# Patient Record
Sex: MALE | Race: WHITE | Employment: PART TIME | URBAN - METROPOLITAN AREA
[De-identification: names, ages, dates, MRNs, and addresses within clinical notes are randomized per-mention and may not be internally consistent; named-entity substitution may affect disease eponyms.]

---

## 2017-07-18 ENCOUNTER — OFFICE VISIT (OUTPATIENT)
Dept: FAMILY MEDICINE CLINIC | Age: 52
End: 2017-07-18

## 2017-07-18 VITALS
SYSTOLIC BLOOD PRESSURE: 118 MMHG | BODY MASS INDEX: 34.95 KG/M2 | TEMPERATURE: 96.7 F | WEIGHT: 258 LBS | RESPIRATION RATE: 16 BRPM | HEART RATE: 80 BPM | DIASTOLIC BLOOD PRESSURE: 92 MMHG | HEIGHT: 72 IN | OXYGEN SATURATION: 97 %

## 2017-07-18 DIAGNOSIS — M54.2 CHRONIC NECK PAIN: ICD-10-CM

## 2017-07-18 DIAGNOSIS — G89.29 CHRONIC NECK PAIN: ICD-10-CM

## 2017-07-18 DIAGNOSIS — M50.30 DEGENERATIVE DISC DISEASE, CERVICAL: Primary | ICD-10-CM

## 2017-07-18 DIAGNOSIS — R03.0 ELEVATED BP WITHOUT DIAGNOSIS OF HYPERTENSION: ICD-10-CM

## 2017-07-18 RX ORDER — ACETAMINOPHEN AND CODEINE PHOSPHATE 300; 30 MG/1; MG/1
1 TABLET ORAL
COMMUNITY
End: 2017-10-02 | Stop reason: SDUPTHER

## 2017-07-18 RX ORDER — CICLOPIROX OLAMINE 7.7 MG/G
CREAM TOPICAL 2 TIMES DAILY
COMMUNITY
End: 2018-02-05

## 2017-07-18 RX ORDER — CYCLOBENZAPRINE HCL 10 MG
TABLET ORAL
COMMUNITY
End: 2018-02-05

## 2017-07-18 RX ORDER — OXYCODONE AND ACETAMINOPHEN 5; 325 MG/1; MG/1
TABLET ORAL
COMMUNITY
End: 2017-11-06 | Stop reason: ALTCHOICE

## 2017-07-18 RX ORDER — ALBUTEROL SULFATE 90 UG/1
AEROSOL, METERED RESPIRATORY (INHALATION)
COMMUNITY

## 2017-07-18 RX ORDER — BUDESONIDE AND FORMOTEROL FUMARATE DIHYDRATE 160; 4.5 UG/1; UG/1
2 AEROSOL RESPIRATORY (INHALATION) 2 TIMES DAILY
COMMUNITY
End: 2017-11-06 | Stop reason: SDUPTHER

## 2017-07-18 RX ORDER — ZOLPIDEM TARTRATE 5 MG/1
TABLET ORAL
COMMUNITY
End: 2017-09-01 | Stop reason: SDUPTHER

## 2017-07-18 RX ORDER — GABAPENTIN 300 MG/1
300 CAPSULE ORAL
Qty: 60 CAP | Refills: 2 | Status: SHIPPED | OUTPATIENT
Start: 2017-07-18 | End: 2017-08-04 | Stop reason: SDUPTHER

## 2017-07-18 RX ORDER — TAMSULOSIN HYDROCHLORIDE 0.4 MG/1
0.4 CAPSULE ORAL DAILY
COMMUNITY
End: 2017-10-19 | Stop reason: SDUPTHER

## 2017-07-18 RX ORDER — ALLOPURINOL 100 MG/1
TABLET ORAL DAILY
COMMUNITY
End: 2017-09-15 | Stop reason: SDUPTHER

## 2017-07-18 NOTE — MR AVS SNAPSHOT
Visit Information Date & Time Provider Department Dept. Phone Encounter #  
 7/18/2017  8:30 AM Patel Mejia MD [facility] [phone] [id number] Follow-up Instructions Return if symptoms worsen or fail to improve. Allergies as of 7/18/2017  Review Complete On: 7/18/2017 By: Patel Mejia MD  
 No Known Allergies Current Immunizations  Never Reviewed No immunizations on file. Not reviewed this visit You Were Diagnosed With   
  
 Codes Comments Degenerative disc disease, cervical    -  Primary ICD-10-CM: M50.30 ICD-9-CM: 722.4 Chronic neck pain     ICD-10-CM: M54.2, G89.29 ICD-9-CM: 723.1, 338.29 Vitals BP Pulse Temp Resp Height(growth percentile) Weight(growth percentile) (!) 135/108 (BP 1 Location: Right arm, BP Patient Position: Sitting) 80 96.7 °F (35.9 °C) (Oral) 16 6' (1.829 m) 258 lb (117 kg) SpO2 BMI Smoking Status 97% 34.99 kg/m2 Former Smoker BMI and BSA Data Body Mass Index Body Surface Area 34.99 kg/m 2 2.44 m 2 Preferred Pharmacy Pharmacy Name Phone [address] [facility] [address], [address] Your Updated Medication List  
  
   
This list is accurate as of: 7/18/17  9:09 AM.  Always use your most recent med list.  
  
  
  
  
 albuterol 90 mcg/actuation inhaler Commonly known as:  PROVENTIL HFA, VENTOLIN HFA, PROAIR HFA Take  by inhalation. allopurinol 100 mg tablet Commonly known as:  Rachael Roberto Take  by mouth daily. AMBIEN 5 mg tablet Generic drug:  zolpidem Take  by mouth nightly as needed for Sleep. ciclopirox 0.77 % topical cream  
Commonly known as:  Claria Emmer Apply  to affected area two (2) times a day. cyclobenzaprine 10 mg tablet Commonly known as:  FLEXERIL Take  by mouth three (3) times daily as needed for Muscle Spasm(s). gabapentin 300 mg capsule Commonly known as:  NEURONTIN Take 1 Cap by mouth nightly. PERCOCET 5-325 mg per tablet Generic drug:  oxyCODONE-acetaminophen Take  by mouth every six (6) hours as needed for Pain. SYMBICORT 160-4.5 mcg/actuation HFA inhaler Generic drug:  budesonide-formoterol Take 2 Puffs by inhalation two (2) times a day. tamsulosin 0.4 mg capsule Commonly known as:  FLOMAX Take 0.4 mg by mouth daily. TYLENOL-CODEINE #3 300-30 mg per tablet Generic drug:  acetaminophen-codeine Take 1 Tab by mouth every six (6) hours as needed for Pain. Prescriptions Sent to Pharmacy Refills  
 gabapentin (NEURONTIN) 300 mg capsule 2 Sig: Take 1 Cap by mouth nightly. Class: Normal  
 Pharmacy: [address] [place] [address] #: [phone] Route: Oral  
  
We Performed the Following REFERRAL TO ORTHOPEDICS [HCL735 Custom] Comments:  
 Please evaluate for chronic neck pain REFERRAL TO PAIN MANAGEMENT [BDP391 Custom] Comments:  
 Please evaluate patient for chronic neck pain. Follow-up Instructions Return if symptoms worsen or fail to improve. Referral Information Referral ID Referred By Referred To  
  
 [id number] THADDEUS [address] [place] [address] MD Magan   
   [address] [place] [address] Street Phone: [phone] Fax: [phone] Visits Status Start Date End Date 1 New Request 7/18/17 7/18/18 If your referral has a status of pending review or denied, additional information will be sent to support the outcome of this decision. Referral ID Referred By Referred To  
 [id number] Deacon TRAVIS PA-C  
   [address] [facility], [address] Phone: [phone] Fax: [phone] Visits Status Start Date End Date 1 New Request 7/18/17 7/18/18  If your referral has a status of pending review or denied, additional information will be sent to support the outcome of this decision. Patient Instructions I am referring you to our spine specialist and pain management specialist. They do epidurals, etc if needed. In the meantime, start neurontin 300 mg at bedtime. If needed, after a week increase to 300 mg twice daily. Recheck as needed for other medical issues. Introducing [facility] SERVICES! Greg Brewster introduces [employer] patient portal. Now you can access parts of your medical record, email your doctor's office, and request medication refills online. 1. In your internet browser, go to [email or web address] Me 2. Click on the First Time User? Click Here link in the Sign In box. You will see the New Member Sign Up page. 3. Enter your [employer] Access Code exactly as it appears below. You will not need to use this code after youve completed the sign-up process. If you do not sign up before the expiration date, you must request a new code. · [employer] Access Code: [email or web address] Expires: 10/16/2017  8:53 AM 
 
4. Enter the last four digits of your Social Security Number (xxxx) and Date of Birth (mm/dd/yyyy) as indicated and click Submit. You will be taken to the next sign-up page. 5. Create a [employer] ID. This will be your [employer] login ID and cannot be changed, so think of one that is secure and easy to remember. 6. Create a [employer] password. You can change your password at any time. 7. Enter your Password Reset Question and Answer. This can be used at a later time if you forget your password. 8. Enter your e-mail address. You will receive e-mail notification when new information is available in [address] 9. Click Sign Up. You can now view and download portions of your medical record. 10. Click the Download Summary menu link to download a portable copy of your medical information.  
 
If you have questions, please visit the Frequently Asked Questions section of the [employer]. Remember, [employer] is NOT to be used for urgent needs. For medical emergencies, dial 911. Now available from your iPhone and Android! Please provide this summary of care documentation to your next provider. If you have any questions after today's visit, please call [phone].
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
## 2017-07-18 NOTE — PATIENT INSTRUCTIONS
I am referring you to our spine specialist and pain management specialist. They do epidurals, etc if needed. In the meantime, start neurontin 300 mg at bedtime. If needed, after a week increase to 300 mg twice daily. Recheck as needed for other medical issues.

## 2017-07-24 ENCOUNTER — HOSPITAL ENCOUNTER (OUTPATIENT)
Dept: LAB | Age: 52
Discharge: HOME | End: 2017-07-24
Payer: MEDICARE

## 2017-07-24 ENCOUNTER — OFFICE VISIT (OUTPATIENT)
Dept: FAMILY MEDICINE CLINIC | Age: 52
End: 2017-07-24

## 2017-07-24 VITALS
DIASTOLIC BLOOD PRESSURE: 94 MMHG | HEIGHT: 72 IN | OXYGEN SATURATION: 96 % | SYSTOLIC BLOOD PRESSURE: 138 MMHG | RESPIRATION RATE: 16 BRPM | WEIGHT: 260 LBS | HEART RATE: 80 BPM | TEMPERATURE: 97.7 F | BODY MASS INDEX: 35.21 KG/M2

## 2017-07-24 DIAGNOSIS — E78.5 HYPERLIPIDEMIA, UNSPECIFIED HYPERLIPIDEMIA TYPE: ICD-10-CM

## 2017-07-24 DIAGNOSIS — R03.0 ELEVATED BLOOD-PRESSURE READING WITHOUT DIAGNOSIS OF HYPERTENSION: ICD-10-CM

## 2017-07-24 DIAGNOSIS — E78.5 HYPERLIPIDEMIA, UNSPECIFIED HYPERLIPIDEMIA TYPE: Primary | ICD-10-CM

## 2017-07-24 DIAGNOSIS — Z79.899 HIGH RISK MEDICATION USE: ICD-10-CM

## 2017-07-24 DIAGNOSIS — Z13.31 SCREENING FOR DEPRESSION: ICD-10-CM

## 2017-07-24 DIAGNOSIS — E79.0 HYPERURICEMIA: ICD-10-CM

## 2017-07-24 LAB
ALBUMIN SERPL BCP-MCNC: 3.6 G/DL (ref 3.4–5)
ALBUMIN/GLOB SERPL: 1.2 {RATIO} (ref 0.8–1.7)
ALP SERPL-CCNC: 93 U/L (ref 45–117)
ALT SERPL-CCNC: 33 U/L (ref 16–61)
ANION GAP BLD CALC-SCNC: 10 MMOL/L (ref 3–18)
AST SERPL W P-5'-P-CCNC: 16 U/L (ref 15–37)
BILIRUB SERPL-MCNC: 0.2 MG/DL (ref 0.2–1)
BUN SERPL-MCNC: 21 MG/DL (ref 7–18)
BUN/CREAT SERPL: 16 (ref 12–20)
CALCIUM SERPL-MCNC: 8.8 MG/DL (ref 8.5–10.1)
CHLORIDE SERPL-SCNC: 110 MMOL/L (ref 100–108)
CHOLEST SERPL-MCNC: 179 MG/DL
CO2 SERPL-SCNC: 24 MMOL/L (ref 21–32)
CREAT SERPL-MCNC: 1.28 MG/DL (ref 0.6–1.3)
GLOBULIN SER CALC-MCNC: 3 G/DL (ref 2–4)
GLUCOSE SERPL-MCNC: 106 MG/DL (ref 74–99)
HDLC SERPL-MCNC: 19 MG/DL (ref 40–60)
HDLC SERPL: 9.4 {RATIO} (ref 0–5)
LDLC SERPL CALC-MCNC: 112.8 MG/DL (ref 0–100)
LIPID PROFILE,FLP: ABNORMAL
POTASSIUM SERPL-SCNC: 4.9 MMOL/L (ref 3.5–5.5)
PROT SERPL-MCNC: 6.6 G/DL (ref 6.4–8.2)
SODIUM SERPL-SCNC: 144 MMOL/L (ref 136–145)
TRIGL SERPL-MCNC: 236 MG/DL (ref ?–150)
URATE SERPL-MCNC: 7.7 MG/DL (ref 2.6–7.2)
VLDLC SERPL CALC-MCNC: 47.2 MG/DL

## 2017-07-24 PROCEDURE — 84550 ASSAY OF BLOOD/URIC ACID: CPT | Performed by: FAMILY MEDICINE

## 2017-07-24 PROCEDURE — 80061 LIPID PANEL: CPT | Performed by: FAMILY MEDICINE

## 2017-07-24 PROCEDURE — 80053 COMPREHEN METABOLIC PANEL: CPT | Performed by: FAMILY MEDICINE

## 2017-07-24 NOTE — PROGRESS NOTES
Rm 1  Pt presents to the clinic for a CPE. Depression Screening Completed: yes    Learning Assessment Completed: yes    Abuse Screening Completed: n/a    Health Maintenance reviewed and discussed per provider: yes     Coordination of Care:    1. Have you been to the ER, urgent care clinic since your last visit? Hospitalized since your last visit? no    2. Have you seen or consulted any other health care providers outside of the [address] [place] [address] since your last visit? Include any pap smears or colon screening.  no

## 2017-07-24 NOTE — PROGRESS NOTES
HISTORY OF PRESENT ILLNESS  Reina Farris is a 45 y.o. male. HPI Comments: Mr. Tulio Carney is here for a checkup. He has a history of BPH, elevated lipids, and his BP has been slightly high the last two visits. He has chronic back issues and I referred him to Dr. Brittany Ortiz and pain management. Complete Physical   Pertinent negatives include no chest pain, no abdominal pain, no headaches and no shortness of breath. Review of Systems   Constitutional: Negative for chills and fever. Eyes: Negative for blurred vision and double vision. Respiratory: Negative for cough and shortness of breath. Cardiovascular: Negative for chest pain and palpitations. Gastrointestinal: Negative for abdominal pain, nausea and vomiting. Musculoskeletal: Positive for back pain, myalgias and neck pain. Neurological: Negative for headaches. Psychiatric/Behavioral: The patient has insomnia. Physical Exam   Constitutional: He is oriented to person, place, and time. He appears well-developed and well-nourished. No distress. HENT:   Head: Normocephalic. Right Ear: External ear normal.   Left Ear: External ear normal.   Eyes: Conjunctivae are normal. Pupils are equal, round, and reactive to light. Neck: Normal range of motion. Neck supple. No thyromegaly present. Cardiovascular: Normal rate, regular rhythm and normal heart sounds. No murmur heard. Pulmonary/Chest: Effort normal and breath sounds normal. No respiratory distress. He has no wheezes. He has no rales. Abdominal: Soft. Bowel sounds are normal. He exhibits no distension and no mass. There is no tenderness. There is no rebound and no guarding. Musculoskeletal:   Decreased ROM back   Lymphadenopathy:     He has no cervical adenopathy. Neurological: He is alert and oriented to person, place, and time. Coordination normal.   Skin: No rash noted. Psychiatric: He has a normal mood and affect.  His behavior is normal.   Nursing note and vitals reviewed. ASSESSMENT and PLAN    ICD-10-CM ICD-9-CM    1. Hyperlipidemia, unspecified hyperlipidemia type E78.5 272.4 LIPID PANEL   2. Elevated blood-pressure reading without diagnosis of hypertension P36.4 840.4 METABOLIC PANEL, COMPREHENSIVE   3. Hyperuricemia E79.0 790.6 URIC ACID   4. High risk medication use O81.574 S88.99 METABOLIC PANEL, COMPREHENSIVE   5.  Screening for depression Z13.89 V79.0 TX DEPRESSION SCREEN ANNUAL       AVS instructions reviewed with patient, pt verbalized understanding

## 2017-07-24 NOTE — PATIENT INSTRUCTIONS
Follow up on lab results in 1-2 days. If you sign up for \"My Chart\" you will be able to see the results online, and if you have any questions you can send me an e-mail.      Recheck will depend on lab results

## 2017-07-24 NOTE — MR AVS SNAPSHOT
Visit Information Date & Time Provider Department Dept. Phone Encounter #  
 7/24/2017  7:00 AM Jessi Landers, [address] [phone] [id number] Your Appointments 8/30/2017  3:00 PM  
Nurse Visit with [facility] NURSE [facility] Appt Note: psa-gre  
 [address] [facility] [address] [address] [id number]  
  
    
 9/6/2017  3:00 PM  
ESTABLISHED PATIENT with Robby Avila MD  
[facility]) Appt Note: Return in about 1 year (around 9/29/2017) for Sameday PSA, PVR;, FLOW;.  
 [address] [address]  
[phone]  
  
   
 [place] [id number] Upcoming Health Maintenance Date Due Hepatitis C Screening 1965 Pneumococcal 19-64 Medium Risk (1 of 1 - PPSV23) 11/28/1983 DTaP/Tdap/Td series (1 - Tdap) 11/28/1985 FOBT Q 1 YEAR AGE 50-75 11/28/2014 INFLUENZA AGE 9 TO ADULT 8/1/2017 Allergies as of 7/24/2017  Review Complete On: 7/24/2017 By: Bryan Hodges LPN No Known Allergies Current Immunizations  Never Reviewed No immunizations on file. Not reviewed this visit You Were Diagnosed With   
  
 Codes Comments Hyperlipidemia, unspecified hyperlipidemia type    -  Primary ICD-10-CM: E78.5 ICD-9-CM: 272.4 Elevated blood-pressure reading without diagnosis of hypertension     ICD-10-CM: R03.0 ICD-9-CM: 796.2 Hyperuricemia     ICD-10-CM: E79.0 ICD-9-CM: 790.6 High risk medication use     ICD-10-CM: Z79.899 ICD-9-CM: V58.69 Screening for depression     ICD-10-CM: Z13.89 ICD-9-CM: V79.0 Vitals BP Pulse Temp Resp Height(growth percentile) Weight(growth percentile)  (!) 138/94 (BP 1 Location: Right arm, BP Patient Position: Sitting) 80 97.7 °F (36.5 °C) (Oral) 16 6' (1.829 m) 260 lb (117.9 kg) SpO2 BMI Smoking Status 96% 35.26 kg/m2 Former Smoker Vitals History BMI and BSA Data Body Mass Index Body Surface Area  
 35.26 kg/m 2 2.45 m 2 Preferred Pharmacy Pharmacy Name Phone [address], [address] Your Updated Medication List  
  
   
This list is accurate as of: 7/24/17  7:37 AM.  Always use your most recent med list.  
  
  
  
  
 albuterol 90 mcg/actuation inhaler Commonly known as:  PROVENTIL HFA, VENTOLIN HFA, PROAIR HFA Take  by inhalation. * allopurinol 100 mg tablet Commonly known as:  Magalene Oyster Take 100 mg by mouth daily. * allopurinol 100 mg tablet Commonly known as:  Magalene Oyster Take  by mouth daily. * AMBIEN 5 mg tablet Generic drug:  zolpidem Take  by mouth nightly as needed for Sleep. * AMBIEN 5 mg tablet Generic drug:  zolpidem Take  by mouth nightly as needed for Sleep. ANTIFUNGAL EX  
by Apply Externally route. ciclopirox 0.77 % topical cream  
Commonly known as:  Sharyon  Apply  to affected area two (2) times a day. cyclobenzaprine 10 mg tablet Commonly known as:  FLEXERIL Take  by mouth three (3) times daily as needed for Muscle Spasm(s). gabapentin 300 mg capsule Commonly known as:  NEURONTIN Take 1 Cap by mouth nightly. mometasone 0.1 % ointment Commonly known as:  Cammy Fan Apply  to affected area daily. PERCOCET 5-325 mg per tablet Generic drug:  oxyCODONE-acetaminophen Take  by mouth every six (6) hours as needed for Pain. SYMBICORT 160-4.5 mcg/actuation HFA inhaler Generic drug:  budesonide-formoterol Take 2 Puffs by inhalation two (2) times a day. * tamsulosin 0.4 mg capsule Commonly known as:  FLOMAX Take 0.4 mg by mouth daily. * tamsulosin 0.4 mg capsule Commonly known as:  FLOMAX Take 1 Cap by mouth daily (after dinner). * TYLENOL-CODEINE #3 300-30 mg per tablet Generic drug:  acetaminophen-codeine Take 1 Tab by mouth every four (4) hours as needed for Pain. * TYLENOL-CODEINE #3 300-30 mg per tablet Generic drug:  acetaminophen-codeine Take 1 Tab by mouth every six (6) hours as needed for Pain. * Notice: This list has 8 medication(s) that are the same as other medications prescribed for you. Read the directions carefully, and ask your doctor or other care provider to review them with you. We Performed the Following [address] Monitor My Meds [ [facility]] To-Do List   
 07/24/2017 Lab:  LIPID PANEL   
  
 07/24/2017 Lab:  METABOLIC PANEL, COMPREHENSIVE   
  
 07/24/2017 Lab:  URIC ACID Patient Instructions Follow up on lab results in 1-2 days. If you sign up for \"My Chart\" you will be able to see the results online, and if you have any questions you can send me an e-mail. Recheck will depend on lab results Introducing [facility] HE[facility] SERVICES! Dotty Potter introduces [employer] patient portal. Now you can access parts of your medical record, email your doctor's office, and request medication refills online. 1. In your internet browser, go to [email or web address] 2. Click on the First Time User? Click Here link in the Sign In box. You will see the New Member Sign Up page. 3. Enter your [employer] Access Code exactly as it appears below. You will not need to use this code after youve completed the sign-up process. If you do not sign up before the expiration date, you must request a new code. · [employer] Access Code: [email or web address] Expires: 7/31/2017  3:29 PM 
 
4. Enter the last four digits of your Social Security Number (xxxx) and Date of Birth (mm/dd/yyyy) as indicated and click Submit. You will be taken to the next sign-up page. 5. Create a [employer] ID. This will be your [employer] login ID and cannot be changed, so think of one that is secure and easy to remember. 6. Create a [employer] password. You can change your password at any time. 7. Enter your Password Reset Question and Answer. This can be used at a later time if you forget your password. 8. Enter your e-mail address. You will receive e-mail notification when new information is available in [address] 9. Click Sign Up. You can now view and download portions of your medical record. 10. Click the Download Summary menu link to download a portable copy of your medical information. If you have questions, please visit the Frequently Asked Questions section of the [employer] website. Remember, [employer] is NOT to be used for urgent needs. For medical emergencies, dial 911. Now available from your iPhone and Android! Please provide this summary of care documentation to your next provider. Your primary care clinician is listed as Zurdo Campos. If you have any questions after today's visit, please call [phone].

## 2017-07-25 ENCOUNTER — TELEPHONE (OUTPATIENT)
Dept: FAMILY MEDICINE CLINIC | Age: 52
End: 2017-07-25

## 2017-07-31 NOTE — PROGRESS NOTES
Advise pt that his lipids are slightly elevated, and his HDL (good cholesterol) is very low. Since he can't exercise very much due to his back, he really needs to cut back on carbs and fat and lose some weight or he will be a setup for trouble in the future.

## 2017-08-01 NOTE — PROGRESS NOTES
Called pt and informed him of his elevated lipids and the need to watch his diet more closely, decreasing his carb and fat intake and possibly losing some weight. Pt verbalized understanding.

## 2017-08-04 DIAGNOSIS — M54.2 CHRONIC NECK PAIN: ICD-10-CM

## 2017-08-04 DIAGNOSIS — M50.30 DEGENERATIVE DISC DISEASE, CERVICAL: ICD-10-CM

## 2017-08-04 DIAGNOSIS — G89.29 CHRONIC NECK PAIN: ICD-10-CM

## 2017-08-04 RX ORDER — GABAPENTIN 300 MG/1
300 CAPSULE ORAL
Qty: 60 CAP | Refills: 2 | Status: SHIPPED | OUTPATIENT
Start: 2017-08-04 | End: 2017-08-07 | Stop reason: DRUGHIGH

## 2017-08-07 ENCOUNTER — TELEPHONE (OUTPATIENT)
Dept: FAMILY MEDICINE CLINIC | Age: 52
End: 2017-08-07

## 2017-08-07 RX ORDER — GABAPENTIN 300 MG/1
300 CAPSULE ORAL 2 TIMES DAILY
Qty: 180 CAP | Refills: 0 | Status: SHIPPED | OUTPATIENT
Start: 2017-08-07 | End: 2017-08-30 | Stop reason: SDUPTHER

## 2017-08-07 NOTE — TELEPHONE ENCOUNTER
Patient called the office to report a discrepancy with his prescription. Patient stated he was told to take gabapentin 300 mg twice daily. A refill was done on 08/04/17 for the prescription but the sig reflected \"take 1 cap nightly\". Patient is requesting a changed be made because workers compensation would not cover his prescription. Patient was advised a message will be sent to Dr. Alesia Panda and the prescription will be available later at his pharmacy. Patient verbalized understanding and had no further questions.

## 2017-08-31 RX ORDER — GABAPENTIN 300 MG/1
300 CAPSULE ORAL 2 TIMES DAILY
Qty: 180 CAP | Refills: 0 | Status: SHIPPED | OUTPATIENT
Start: 2017-08-31 | End: 2017-10-02 | Stop reason: SDUPTHER

## 2017-08-31 RX ORDER — ZOLPIDEM TARTRATE 5 MG/1
5 TABLET ORAL
Qty: 30 TAB | Refills: 0 | OUTPATIENT
Start: 2017-08-31

## 2017-08-31 RX ORDER — ACETAMINOPHEN AND CODEINE PHOSPHATE 300; 30 MG/1; MG/1
1 TABLET ORAL
Qty: 60 TAB | Refills: 0 | Status: SHIPPED | OUTPATIENT
Start: 2017-08-31 | End: 2017-11-06 | Stop reason: SDUPTHER

## 2017-08-31 NOTE — TELEPHONE ENCOUNTER
Pt has an appt with Dr. Stephy Vallejo on 9/11. Will give him enough meds until then.     reviewed, he just received 30 days worth of Ambien on 8/7 and 60 Tylenol #3 tabs on 8/7

## 2017-09-01 DIAGNOSIS — G47.00 INSOMNIA, UNSPECIFIED TYPE: Primary | ICD-10-CM

## 2017-09-01 RX ORDER — ZOLPIDEM TARTRATE 5 MG/1
5 TABLET ORAL
Qty: 30 TAB | Refills: 0 | Status: SHIPPED | OUTPATIENT
Start: 2017-09-07 | End: 2017-10-02 | Stop reason: SDUPTHER

## 2017-09-11 ENCOUNTER — OFFICE VISIT (OUTPATIENT)
Dept: ORTHOPEDIC SURGERY | Age: 52
End: 2017-09-11

## 2017-09-11 VITALS
OXYGEN SATURATION: 97 % | BODY MASS INDEX: 36.48 KG/M2 | SYSTOLIC BLOOD PRESSURE: 141 MMHG | TEMPERATURE: 98.1 F | WEIGHT: 269 LBS | DIASTOLIC BLOOD PRESSURE: 84 MMHG | HEART RATE: 68 BPM | RESPIRATION RATE: 18 BRPM

## 2017-09-11 DIAGNOSIS — M54.50 LUMBAR SPINE PAIN: ICD-10-CM

## 2017-09-11 DIAGNOSIS — M54.16 LUMBAR RADICULOPATHY: ICD-10-CM

## 2017-09-11 DIAGNOSIS — M54.2 NECK PAIN: Primary | ICD-10-CM

## 2017-09-11 DIAGNOSIS — M79.18 MYOFASCIAL PAIN: ICD-10-CM

## 2017-09-11 RX ORDER — PREDNISONE 10 MG/1
TABLET ORAL
Qty: 21 TAB | Refills: 0 | Status: SHIPPED | OUTPATIENT
Start: 2017-09-11 | End: 2018-02-05 | Stop reason: ALTCHOICE
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
## 2017-09-11 NOTE — PATIENT INSTRUCTIONS
Nomiku Activation    Thank you for requesting access to Nomiku. Please follow the instructions below to securely access and download your online medical record. Nomiku allows you to send messages to your doctor, view your test results, renew your prescriptions, schedule appointments, and more. How Do I Sign Up? 1. In your internet browser, go to [email or web address]  2. Click on the First Time User? Click Here link in the Sign In box. You will be redirect to the New Member Sign Up page. 3. Enter your Nomiku Access Code exactly as it appears below. You will not need to use this code after youve completed the sign-up process. If you do not sign up before the expiration date, you must request a new code. Nomiku Access Code: [email or web address]  Expires: 2017  2:19 PM (This is the date your Nomiku access code will )    4. Enter the last four digits of your Social Security Number (xxxx) and Date of Birth (mm/dd/yyyy) as indicated and click Submit. You will be taken to the next sign-up page. 5. Create a Nomiku ID. This will be your Nomiku login ID and cannot be changed, so think of one that is secure and easy to remember. 6. Create a Nomiku password. You can change your password at any time. 7. Enter your Password Reset Question and Answer. This can be used at a later time if you forget your password. 8. Enter your e-mail address. You will receive e-mail notification when new information is available in [address]. 9. Click Sign Up. You can now view and download portions of your medical record. 10. Click the Download Summary menu link to download a portable copy of your medical information. Additional Information    If you have questions, please visit the Frequently Asked Questions section of the Nomiku website at [email or web address] Remember, Nomiku is NOT to be used for urgent needs. For medical emergencies, dial 911.          Neck: Exercises  Your Care Instructions  Here are some examples of typical rehabilitation exercises for your condition. Start each exercise slowly. Ease off the exercise if you start to have pain. Your doctor or physical therapist will tell you when you can start these exercises and which ones will work best for you. How to do the exercises  Note: Stretching should make you feel a gentle stretch, but no pain. Stop any strengthening exercise that makes pain worse. Neck stretch    1. This stretch works best if you keep your shoulder down as you lean away from it. To help you remember to do this, start by relaxing your shoulders and lightly holding on to your thighs or your chair. 2. Tilt your head toward your shoulder and hold for 15 to 30 seconds. Let the weight of your head stretch your muscles. 3. If you would like a little added stretch, use your hand to gently and steadily pull your head toward your shoulder. For example, keeping your right shoulder down, lean your head to the left. 4. Repeat 2 to 4 times toward each shoulder. Diagonal neck stretch    1. Turn your head slightly toward the direction you will be stretching, and tilt your head diagonally toward your chest and hold for 15 to 30 seconds. 2. If you would like a little added stretch, use your hand to gently and steadily pull your head forward on the diagonal.  3. Repeat 2 to 4 times toward each side. Dorsal glide stretch    1. Sit or stand tall and look straight ahead. 2. Slowly tuck your chin as you glide your head backward over your body  3. Hold for a count of 6, and then relax for up to 10 seconds. 4. Repeat 8 to 12 times. Note: The dorsal glide stretches the back of the neck. If you feel pain, do not glide so far back. Some people find this exercise easier to do while lying on their backs with an ice pack on the neck. Chest and shoulder stretch    1. Sit or stand tall and glide your head backward as in the dorsal glide stretch.   2. Raise both arms so that your hands are next to your ears. 3. Take a deep breath, and as you breathe out, lower your elbows down and behind your back. You will feel your shoulder blades slide down and together, and at the same time you will feel a stretch across your chest and the front of your shoulders. 4. Hold for about 6 seconds, and then relax for up to 10 seconds. 5. Repeat 8 to 12 times. Strengthening: Hands on head    1. Move your head backward, forward, and side to side against gentle pressure from your hands, holding each position for about 6 seconds. 2. Repeat 8 to 12 times. Follow-up care is a key part of your treatment and safety. Be sure to make and go to all appointments, and call your doctor if you are having problems. It's also a good idea to know your test results and keep a list of the medicines you take. Where can you learn more? Go to http://josh-elicia.info/. Enter P975 in the search box to learn more about \"Neck: Exercises. \"  Current as of: March 21, 2017  Content Version: 11.3  © 6735-8829 [employer]. Care instructions adapted under license by [employer] (which disclaims liability or warranty for this information). If you have questions about a medical condition or this instruction, always ask your healthcare professional. [address] any warranty or liability for your use of this information. Low Back Pain: Exercises  Your Care Instructions  Here are some examples of typical rehabilitation exercises for your condition. Start each exercise slowly. Ease off the exercise if you start to have pain. Your doctor or physical therapist will tell you when you can start these exercises and which ones will work best for you. How to do the exercises  Press-up    5. Lie on your stomach, supporting your body with your forearms. 6. Press your elbows down into the floor to raise your upper back.  As you do this, relax your stomach muscles and allow your back to arch without using your back muscles. As your press up, do not let your hips or pelvis come off the floor. 7. Hold for 15 to 30 seconds, then relax. 8. Repeat 2 to 4 times. Alternate arm and leg (bird dog) exercise    Note: Do this exercise slowly. Try to keep your body straight at all times, and do not let one hip drop lower than the other. 4. Start on the floor, on your hands and knees. 5. Tighten your belly muscles. 6. Raise one leg off the floor, and hold it straight out behind you. Be careful not to let your hip drop down, because that will twist your trunk. 7. Hold for about 6 seconds, then lower your leg and switch to the other leg. 8. Repeat 8 to 12 times on each leg. 9. Over time, work up to holding for 10 to 30 seconds each time. 10. If you feel stable and secure with your leg raised, try raising the opposite arm straight out in front of you at the same time. Knee-to-chest exercise    5. Lie on your back with your knees bent and your feet flat on the floor. 6. Bring one knee to your chest, keeping the other foot flat on the floor (or keeping the other leg straight, whichever feels better on your lower back). 7. Keep your lower back pressed to the floor. Hold for at least 15 to 30 seconds. 8. Relax, and lower the knee to the starting position. 9. Repeat with the other leg. Repeat 2 to 4 times with each leg. 10. To get more stretch, put your other leg flat on the floor while pulling your knee to your chest.  Curl-ups    6. Lie on the floor on your back with your knees bent at a 90-degree angle. Your feet should be flat on the floor, about 12 inches from your buttocks. 7. Cross your arms over your chest. If this bothers your neck, try putting your hands behind your neck (not your head), with your elbows spread apart. 8. Slowly tighten your belly muscles and raise your shoulder blades off the floor.   9. Keep your head in line with your body, and do not press your chin to your chest.  10. Hold this position for 1 or 2 seconds, then slowly lower yourself back down to the floor. 11. Repeat 8 to 12 times. Pelvic tilt exercise    3. Lie on your back with your knees bent. 4. \"Brace\" your stomach. This means to tighten your muscles by pulling in and imagining your belly button moving toward your spine. You should feel like your back is pressing to the floor and your hips and pelvis are rocking back. 5. Hold for about 6 seconds while you breathe smoothly. 6. Repeat 8 to 12 times. Heel dig bridging    1. Lie on your back with both knees bent and your ankles bent so that only your heels are digging into the floor. Your knees should be bent about 90 degrees. 2. Then push your heels into the floor, squeeze your buttocks, and lift your hips off the floor until your shoulders, hips, and knees are all in a straight line. 3. Hold for about 6 seconds as you continue to breathe normally, and then slowly lower your hips back down to the floor and rest for up to 10 seconds. 4. Do 8 to 12 repetitions. Hamstring stretch in doorway    1. Lie on your back in a doorway, with one leg through the open door. 2. Slide your leg up the wall to straighten your knee. You should feel a gentle stretch down the back of your leg. 3. Hold the stretch for at least 15 to 30 seconds. Do not arch your back, point your toes, or bend either knee. Keep one heel touching the floor and the other heel touching the wall. 4. Repeat with your other leg. 5. Do 2 to 4 times for each leg. Hip flexor stretch    1. Kneel on the floor with one knee bent and one leg behind you. Place your forward knee over your foot. Keep your other knee touching the floor. 2. Slowly push your hips forward until you feel a stretch in the upper thigh of your rear leg. 3. Hold the stretch for at least 15 to 30 seconds. Repeat with your other leg. 4. Do 2 to 4 times on each side. Wall sit    1.  Stand with your back 10 to 12 inches away from a wall.  2. Lean into the wall until your back is flat against it. 3. Slowly slide down until your knees are slightly bent, pressing your lower back into the wall. 4. Hold for about 6 seconds, then slide back up the wall. 5. Repeat 8 to 12 times. Follow-up care is a key part of your treatment and safety. Be sure to make and go to all appointments, and call your doctor if you are having problems. It's also a good idea to know your test results and keep a list of the medicines you take. Where can you learn more? Go to http://josh-elicia.info/. Enter B314 in the search box to learn more about \"Low Back Pain: Exercises. \"  Current as of: March 21, 2017  Content Version: 11.3  © 9453-2650 [employer]. Care instructions adapted under license by [employer] (which disclaims liability or warranty for this information). If you have questions about a medical condition or this instruction, always ask your healthcare professional. [address] any warranty or liability for your use of this information.

## 2017-09-13 ENCOUNTER — TELEPHONE (OUTPATIENT)
Dept: ORTHOPEDIC SURGERY | Age: 52
End: 2017-09-13

## 2017-09-13 NOTE — TELEPHONE ENCOUNTER
Requested auth for MRI from [employer] [address] [facility] , auth pending.

## 2017-09-15 DIAGNOSIS — M10.9 ACUTE GOUT, UNSPECIFIED CAUSE, UNSPECIFIED SITE: Primary | ICD-10-CM

## 2017-09-15 DIAGNOSIS — M1A.9XX0 CHRONIC GOUT WITHOUT TOPHUS, UNSPECIFIED CAUSE, UNSPECIFIED SITE: ICD-10-CM

## 2017-09-15 RX ORDER — INDOMETHACIN 50 MG/1
50 CAPSULE ORAL 3 TIMES DAILY
Qty: 21 CAP | Refills: 2 | Status: SHIPPED | OUTPATIENT
Start: 2017-09-15 | End: 2017-09-22

## 2017-09-15 RX ORDER — ALLOPURINOL 100 MG/1
100 TABLET ORAL DAILY
Qty: 90 TAB | Refills: 1 | Status: SHIPPED | OUTPATIENT
Start: 2017-09-15 | End: 2018-11-05

## 2017-09-15 NOTE — TELEPHONE ENCOUNTER
Requested Prescriptions     Pending Prescriptions Disp Refills    allopurinol (ZYLOPRIM) 100 mg tablet       Sig: Take 1 Tab by mouth daily. Also requesting Indomethacin 50 mg capsules (last filled 10/26/2016 by old doctor). Directions on bottle say take one capsule 3 times a day. Pt having a gout flare up, and only takes this particular medication if his all natural supplements fail to take care of the gout.

## 2017-09-27 ENCOUNTER — DOCUMENTATION ONLY (OUTPATIENT)
Dept: ORTHOPEDIC SURGERY | Age: 52
End: 2017-09-27

## 2017-09-27 NOTE — PROGRESS NOTES
I called the patient and left a breif message on VM  that he did not make a F/u at his last appt. For the MRI results. Lm to call and make a F/U MRI appt and to bring the disc with him.

## 2017-09-27 NOTE — PROGRESS NOTES
Pt needs to schedule an MRI f/o with Dr. Aman Aguilar for his LS MRI done on 09/25/17 at [facility].  Have him bring disc

## 2017-10-02 DIAGNOSIS — G47.00 INSOMNIA, UNSPECIFIED TYPE: ICD-10-CM

## 2017-10-02 NOTE — TELEPHONE ENCOUNTER
Requested Prescriptions     Pending Prescriptions Disp Refills    acetaminophen-codeine (TYLENOL-CODEINE #3) 300-30 mg per tablet       Sig: Take 1 Tab by mouth every six (6) hours as needed for Pain. Max Daily Amount: 4 Tabs.  gabapentin (NEURONTIN) 300 mg capsule 180 Cap 0     Sig: Take 1 Cap by mouth two (2) times a day.  zolpidem (AMBIEN) 5 mg tablet 30 Tab 0     Sig: Take 1 Tab by mouth nightly as needed for Sleep. Max Daily Amount: 5 mg.

## 2017-10-03 RX ORDER — ZOLPIDEM TARTRATE 5 MG/1
5 TABLET ORAL
Qty: 30 TAB | Refills: 0 | Status: SHIPPED | OUTPATIENT
Start: 2017-10-03 | End: 2017-11-02 | Stop reason: SDUPTHER

## 2017-10-03 RX ORDER — ACETAMINOPHEN AND CODEINE PHOSPHATE 300; 30 MG/1; MG/1
1 TABLET ORAL
Qty: 28 TAB | Refills: 0 | Status: SHIPPED | OUTPATIENT
Start: 2017-10-03 | End: 2017-11-06 | Stop reason: SDUPTHER

## 2017-10-03 RX ORDER — GABAPENTIN 300 MG/1
300 CAPSULE ORAL 2 TIMES DAILY
Qty: 180 CAP | Refills: 0 | Status: SHIPPED | OUTPATIENT
Start: 2017-10-03 | End: 2018-02-05 | Stop reason: SDUPTHER

## 2017-10-03 NOTE — TELEPHONE ENCOUNTER
reviewed. No aberrant behavior noted, though pt has been receiving Tylenol-3 from another provider. PSR instructed to advise his wife (who is picking up his meds today) that Mr Meagan Eduardo will need to be seen next month for any controlled medication refills, and that we may not refill the Tylenol 3 next month (of which he is provided with a 1-week supply today).

## 2017-10-13 DIAGNOSIS — M54.50 LUMBAR SPINE PAIN: ICD-10-CM

## 2017-10-13 DIAGNOSIS — M54.16 LUMBAR RADICULOPATHY: ICD-10-CM

## 2017-10-23 ENCOUNTER — OFFICE VISIT (OUTPATIENT)
Dept: ORTHOPEDIC SURGERY | Age: 52
End: 2017-10-23

## 2017-10-23 VITALS
HEART RATE: 79 BPM | HEIGHT: 72 IN | OXYGEN SATURATION: 98 % | WEIGHT: 269 LBS | RESPIRATION RATE: 18 BRPM | TEMPERATURE: 98.4 F | BODY MASS INDEX: 36.44 KG/M2 | DIASTOLIC BLOOD PRESSURE: 91 MMHG | SYSTOLIC BLOOD PRESSURE: 133 MMHG

## 2017-10-23 DIAGNOSIS — G89.29 CHRONIC LOW BACK PAIN WITHOUT SCIATICA, UNSPECIFIED BACK PAIN LATERALITY: Primary | ICD-10-CM

## 2017-10-23 DIAGNOSIS — M54.50 CHRONIC LOW BACK PAIN WITHOUT SCIATICA, UNSPECIFIED BACK PAIN LATERALITY: Primary | ICD-10-CM

## 2017-10-23 DIAGNOSIS — M51.36 ANNULAR TEAR OF LUMBAR DISC: ICD-10-CM

## 2017-10-23 NOTE — MR AVS SNAPSHOT
Visit Information Date & Time Provider Department Dept. Phone Encounter #  
 10/23/2017  2:30 PM Lois Becerra MD [facility] [phone] [id number] Follow-up Instructions Return if symptoms worsen or fail to improve. Your Appointments 10/18/2018 10:15 AM  
ESTABLISHED PATIENT with Zenobia Holter, MD  
[facility] Appt Note: Return in about 1 year (around 10/19/2018) for Flow/PVR, PSA prior, BPH, Elevated PSA  
 [address] [address]  
[phone]  
  
   
 [place] [address] Upcoming Health Maintenance Date Due Hepatitis C Screening 1965 Pneumococcal 19-64 Medium Risk (1 of 1 - PPSV23) 11/28/1983 DTaP/Tdap/Td series (1 - Tdap) 11/28/1985 FOBT Q 1 YEAR AGE 50-75 11/28/2014 INFLUENZA AGE 9 TO ADULT 8/1/2017 Allergies as of 10/23/2017  Review Complete On: 10/23/2017 By: Mena Dias LPN No Known Allergies Current Immunizations  Never Reviewed No immunizations on file. Not reviewed this visit You Were Diagnosed With   
  
 Codes Comments Chronic low back pain without sciatica, unspecified back pain laterality    -  Primary ICD-10-CM: M54.5, G89.29 ICD-9-CM: 724.2, 338.29 Annular tear of lumbar disc     ICD-10-CM: M51.36 
ICD-9-CM: 722.52 Vitals BP Pulse Temp Resp Height(growth percentile) Weight(growth percentile) (!) 133/91 79 98.4 °F (36.9 °C) 18 6' (1.829 m) 269 lb (122 kg) SpO2 BMI Smoking Status 98% 36.48 kg/m2 Former Smoker BMI and BSA Data Body Mass Index Body Surface Area  
 36.48 kg/m 2 2.49 m 2 Preferred Pharmacy Pharmacy Name Phone [address], [address] Your Updated Medication List  
  
   
This list is accurate as of: 10/23/17  4:25 PM.  Always use your most recent med list.  
  
  
  
  
 * acetaminophen-codeine 300-30 mg per tablet Commonly known as:  TYLENOL-CODEINE #3 Take 1 Tab by mouth every four (4) hours as needed for Pain. Max Daily Amount: 6 Tabs. * acetaminophen-codeine 300-30 mg per tablet Commonly known as:  TYLENOL-CODEINE #3 Take 1 Tab by mouth every six (6) hours as needed for Pain. Max Daily Amount: 4 Tabs. albuterol 90 mcg/actuation inhaler Commonly known as:  PROVENTIL HFA, VENTOLIN HFA, PROAIR HFA Take  by inhalation. allopurinol 100 mg tablet Commonly known as:  Adele Dakins Take 1 Tab by mouth daily. Indications: prevention of acute gout attack ANTIFUNGAL EX  
by Apply Externally route. ciclopirox 0.77 % topical cream  
Commonly known as:  Bettey Duet Apply  to affected area two (2) times a day. cyclobenzaprine 10 mg tablet Commonly known as:  FLEXERIL Take  by mouth three (3) times daily as needed for Muscle Spasm(s). gabapentin 300 mg capsule Commonly known as:  NEURONTIN Take 1 Cap by mouth two (2) times a day. mometasone 0.1 % ointment Commonly known as:  Izetta Daily Apply  to affected area daily. PERCOCET 5-325 mg per tablet Generic drug:  oxyCODONE-acetaminophen Take  by mouth every six (6) hours as needed for Pain. predniSONE 10 mg dose pack Commonly known as:  STERAPRED DS See administration instruction per 10mg dose pack SYMBICORT 160-4.5 mcg/actuation Hfaa Generic drug:  budesonide-formoterol Take 2 Puffs by inhalation two (2) times a day. tamsulosin 0.4 mg capsule Commonly known as:  FLOMAX Take 1 Cap by mouth daily (after dinner). zolpidem 5 mg tablet Commonly known as:  AMBIEN Take 1 Tab by mouth nightly as needed for Sleep. Max Daily Amount: 5 mg.  
  
 * Notice: This list has 2 medication(s) that are the same as other medications prescribed for you.  Read the directions carefully, and ask your doctor or other care provider to review them with you. Follow-up Instructions Return if symptoms worsen or fail to improve. Introducing [facility] SERVICES! Cyn Beltran introduces kSARIA patient portal. Now you can access parts of your medical record, email your doctor's office, and request medication refills online. 1. In your internet browser, go to [email or web address] 2. Click on the First Time User? Click Here link in the Sign In box. You will see the New Member Sign Up page. 3. Enter your kSARIA Access Code exactly as it appears below. You will not need to use this code after youve completed the sign-up process. If you do not sign up before the expiration date, you must request a new code. · kSARIA Access Code: [email or web address] Expires: 11/22/2017  2:19 PM 
 
4. Enter the last four digits of your Social Security Number (xxxx) and Date of Birth (mm/dd/yyyy) as indicated and click Submit. You will be taken to the next sign-up page. 5. Create a kSARIA ID. This will be your kSARIA login ID and cannot be changed, so think of one that is secure and easy to remember. 6. Create a kSARIA password. You can change your password at any time. 7. Enter your Password Reset Question and Answer. This can be used at a later time if you forget your password. 8. Enter your e-mail address. You will receive e-mail notification when new information is available in [address]. 9. Click Sign Up. You can now view and download portions of your medical record. 10. Click the Download Summary menu link to download a portable copy of your medical information. If you have questions, please visit the Frequently Asked Questions section of the kSARIA website. Remember, kSARIA is NOT to be used for urgent needs. For medical emergencies, dial 911. Now available from your iPhone and Android! Please provide this summary of care documentation to your next provider. Your primary care clinician is listed as Ankush Moser. If you have any questions after today's visit, please call [phone].
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
## 2017-11-02 DIAGNOSIS — G47.00 INSOMNIA, UNSPECIFIED TYPE: ICD-10-CM

## 2017-11-02 RX ORDER — ZOLPIDEM TARTRATE 5 MG/1
5 TABLET ORAL
Qty: 30 TAB | Refills: 0 | Status: SHIPPED | OUTPATIENT
Start: 2017-11-02 | End: 2017-11-30 | Stop reason: SDUPTHER

## 2017-11-02 RX ORDER — ACETAMINOPHEN AND CODEINE PHOSPHATE 300; 30 MG/1; MG/1
1 TABLET ORAL
Qty: 60 TAB | Refills: 0 | OUTPATIENT
Start: 2017-11-02

## 2017-11-06 ENCOUNTER — OFFICE VISIT (OUTPATIENT)
Dept: FAMILY MEDICINE CLINIC | Age: 52
End: 2017-11-06

## 2017-11-06 VITALS
RESPIRATION RATE: 18 BRPM | HEIGHT: 72 IN | HEART RATE: 67 BPM | SYSTOLIC BLOOD PRESSURE: 130 MMHG | OXYGEN SATURATION: 97 % | WEIGHT: 271 LBS | BODY MASS INDEX: 36.7 KG/M2 | DIASTOLIC BLOOD PRESSURE: 94 MMHG | TEMPERATURE: 97.2 F

## 2017-11-06 DIAGNOSIS — M50.30 DEGENERATIVE DISC DISEASE, CERVICAL: ICD-10-CM

## 2017-11-06 DIAGNOSIS — I67.1 ANEURYSM OF POSTERIOR CEREBRAL ARTERY: ICD-10-CM

## 2017-11-06 DIAGNOSIS — M54.50 CHRONIC MIDLINE LOW BACK PAIN WITHOUT SCIATICA: Primary | ICD-10-CM

## 2017-11-06 DIAGNOSIS — G89.29 CHRONIC MIDLINE LOW BACK PAIN WITHOUT SCIATICA: Primary | ICD-10-CM

## 2017-11-06 DIAGNOSIS — G89.29 ENCOUNTER FOR CHRONIC PAIN MANAGEMENT: ICD-10-CM

## 2017-11-06 LAB
ALCOHOL UR POC: NORMAL
AMPHETAMINES UR POC: NEGATIVE
BARBITURATES UR POC: NEGATIVE
BENZODIAZEPINES UR POC: NEGATIVE
BUPRENORPHINE UR POC: NORMAL
CANNABINOIDS UR POC: NEGATIVE
CARISOPRODOL UR POC: NORMAL
COCAINE UR POC: NEGATIVE
FENTANYL UR POC: NORMAL
MDMA/ECSTASY UR POC: NEGATIVE
METHADONE UR POC: NEGATIVE
METHAMPHETAMINE UR POC: NEGATIVE
METHYLPHENIDATE UR POC: NEGATIVE
OPIATES UR POC: NEGATIVE
OXYCODONE UR POC: NEGATIVE
PHENCYCLIDINE UR POC: NORMAL
PROPOXYPHENE UR POC: NORMAL
TRAMADOL UR POC: NEGATIVE
TRICYCLICS UR POC: NORMAL

## 2017-11-06 RX ORDER — BUDESONIDE AND FORMOTEROL FUMARATE DIHYDRATE 80; 4.5 UG/1; UG/1
2 AEROSOL RESPIRATORY (INHALATION) 2 TIMES DAILY
Qty: 1 INHALER | Refills: 5 | Status: SHIPPED | OUTPATIENT
Start: 2017-11-06 | End: 2018-04-03 | Stop reason: SDUPTHER

## 2017-11-06 RX ORDER — BUDESONIDE AND FORMOTEROL FUMARATE DIHYDRATE 160; 4.5 UG/1; UG/1
2 AEROSOL RESPIRATORY (INHALATION) 2 TIMES DAILY
Qty: 1 INHALER | Refills: 5 | Status: SHIPPED | OUTPATIENT
Start: 2017-11-06 | End: 2017-11-06 | Stop reason: CLARIF

## 2017-11-06 RX ORDER — ACETAMINOPHEN AND CODEINE PHOSPHATE 300; 30 MG/1; MG/1
1 TABLET ORAL
Qty: 90 TAB | Refills: 0 | Status: SHIPPED | OUTPATIENT
Start: 2017-11-06 | End: 2017-12-30 | Stop reason: SDUPTHER

## 2017-11-06 NOTE — PROGRESS NOTES
Rm 2  Pt presents to the clinic for a med refill. Pt says he has not heard from Pain Management. Flu Shot Requested: no    Depression Screening:  PHQ over the last two weeks 11/6/2017 7/24/2017 7/18/2017   Little interest or pleasure in doing things Not at all Not at all Not at all   Feeling down, depressed or hopeless Not at all Not at all Not at all   Total Score PHQ 2 0 0 0       Learning Assessment:  Learning Assessment 7/18/2017   PRIMARY LEARNER Patient   HIGHEST LEVEL OF EDUCATION - PRIMARY LEARNER  > 4 YEARS OF COLLEGE   BARRIERS PRIMARY LEARNER NONE   CO-LEARNER CAREGIVER No   PRIMARY LANGUAGE ENGLISH   LEARNER PREFERENCE PRIMARY DEMONSTRATION   ANSWERED BY patient   RELATIONSHIP SELF       Abuse Screening:  No flowsheet data found. Health Maintenance reviewed and discussed per provider: yes     Coordination of Care:    1. Have you been to the ER, urgent care clinic since your last visit? Hospitalized since your last visit? no    2. Have you seen or consulted any other health care providers outside of the [address] [place] [address] since your last visit? Include any pap smears or colon screening.  no

## 2017-11-06 NOTE — MR AVS SNAPSHOT
Visit Information Date & Time Provider Department Dept. Phone Encounter #  
 11/6/2017  7:30 AM Wyatt Marie, [address] [phone] [id number] Follow-up Instructions Return in about 3 months (around 2/6/2018), or if symptoms worsen or fail to improve. Your Appointments 10/18/2018 10:15 AM  
ESTABLISHED PATIENT with Farhat Jones MD  
[facility] of [place] [address] ([address] [facility]) Appt Note: Return in about 1 year (around 10/19/2018) for Flow/PVR, PSA prior, BPH, Elevated PSA  
 301 [facility] [address] [place] [address]  
[phone]  
  
   
 [place] [address] Upcoming Health Maintenance Date Due Hepatitis C Screening 1965 Pneumococcal 19-64 Medium Risk (1 of 1 - PPSV23) 11/28/1983 DTaP/Tdap/Td series (1 - Tdap) 11/28/1985 FOBT Q 1 YEAR AGE 50-75 11/28/2014 INFLUENZA AGE 9 TO ADULT 8/1/2017 Allergies as of 11/6/2017  Review Complete On: 11/6/2017 By: Dion Parish LPN No Known Allergies Current Immunizations  Never Reviewed No immunizations on file. Not reviewed this visit You Were Diagnosed With   
  
 Codes Comments Chronic midline low back pain without sciatica    -  Primary ICD-10-CM: M54.5, G89.29 ICD-9-CM: 724.2, 338.29 Degenerative disc disease, cervical     ICD-10-CM: M50.30 ICD-9-CM: 722.4 Encounter for chronic pain management     ICD-10-CM: G89.29 ICD-9-CM: V65.49 Aneurysm of posterior cerebral artery     ICD-10-CM: I67.1 ICD-9-CM: 437.3 Vitals BP Pulse Temp Resp Height(growth percentile) Weight(growth percentile) (!) 130/94 (BP 1 Location: Left arm, BP Patient Position: Sitting) 67 97.2 °F (36.2 °C) (Oral) 18 6' (1.829 m) 271 lb (122.9 kg) SpO2 BMI Smoking Status 97% 36.75 kg/m2 Former Smoker Vitals History BMI and BSA Data Body Mass Index Body Surface Area  36.75 kg/m 2 2.5 m 2  
 Preferred Pharmacy Pharmacy Name Phone [address], [address] Your Updated Medication List  
  
   
This list is accurate as of: 11/6/17  8:09 AM.  Always use your most recent med list.  
  
  
  
  
 acetaminophen-codeine 300-30 mg per tablet Commonly known as:  TYLENOL-CODEINE #3 Take 1 Tab by mouth three (3) times daily as needed for Pain. Max Daily Amount: 3 Tabs. albuterol 90 mcg/actuation inhaler Commonly known as:  PROVENTIL HFA, VENTOLIN HFA, PROAIR HFA Take  by inhalation. allopurinol 100 mg tablet Commonly known as:  Mimabettie Jacquea Take 1 Tab by mouth daily. Indications: prevention of acute gout attack ANTIFUNGAL EX  
by Apply Externally route. ciclopirox 0.77 % topical cream  
Commonly known as:  Aloha Honor Apply  to affected area two (2) times a day. cyclobenzaprine 10 mg tablet Commonly known as:  FLEXERIL Take  by mouth three (3) times daily as needed for Muscle Spasm(s). gabapentin 300 mg capsule Commonly known as:  NEURONTIN Take 1 Cap by mouth two (2) times a day. mometasone 0.1 % ointment Commonly known as:  Natalia July Apply  to affected area daily. predniSONE 10 mg dose pack Commonly known as:  STERAPRED DS See administration instruction per 10mg dose pack SYMBICORT 160-4.5 mcg/actuation Hfaa Generic drug:  budesonide-formoterol Take 2 Puffs by inhalation two (2) times a day. tamsulosin 0.4 mg capsule Commonly known as:  FLOMAX Take 1 Cap by mouth daily (after dinner). zolpidem 5 mg tablet Commonly known as:  AMBIEN Take 1 Tab by mouth nightly as needed for Sleep. Max Daily Amount: 5 mg. Prescriptions Printed Refills  
 acetaminophen-codeine (TYLENOL-CODEINE #3) 300-30 mg per tablet 0 Sig: Take 1 Tab by mouth three (3) times daily as needed for Pain. Max Daily Amount: 3 Tabs. Class: Print Route: Oral  
  
We Performed the Following AMB POC DRUG SCREEN () [ [facility]] REFERRAL TO NEUROSURGERY [RWK55 Custom] Follow-up Instructions Return in about 3 months (around 2/6/2018), or if symptoms worsen or fail to improve. Referral Information Referral ID Referred By Referred To  
  
 [id number] THADDEUS, Via Rasheed Oseguera MD   
   [address] [place] Phone: [phone] Fax: [phone] Visits Status Start Date End Date 1 New Request 11/6/17 11/6/18 If your referral has a status of pending review or denied, additional information will be sent to support the outcome of this decision. Patient Instructions Take the medication as prescribed. I am referring you to a neurosurgeon because of the thing in your head. You will need to get your records as soon as possible, certainly before seeing the specialist. 
 
 
  
Introducing [facility] SERVICES! [employer] introduces Agentek patient portal. Now you can access parts of your medical record, email your doctor's office, and request medication refills online. 1. In your internet browser, go to [email or web address] 2. Click on the First Time User? Click Here link in the Sign In box. You will see the New Member Sign Up page. 3. Enter your Agentek Access Code exactly as it appears below. You will not need to use this code after youve completed the sign-up process. If you do not sign up before the expiration date, you must request a new code. · Agentek Access Code: [email or web address] Expires: 11/22/2017  1:19 PM 
 
4. Enter the last four digits of your Social Security Number (xxxx) and Date of Birth (mm/dd/yyyy) as indicated and click Submit. You will be taken to the next sign-up page. 5. Create a MeetLinksharet ID. This will be your MeetLinksharet login ID and cannot be changed, so think of one that is secure and easy to remember. 6. Create a Auramist password. You can change your password at any time. 7. Enter your Password Reset Question and Answer. This can be used at a later time if you forget your password. 8. Enter your e-mail address. You will receive e-mail notification when new information is available in [address] 9. Click Sign Up. You can now view and download portions of your medical record. 10. Click the Download Summary menu link to download a portable copy of your medical information. If you have questions, please visit the Frequently Asked Questions section of the Auramist website. Remember, Auramist is NOT to be used for urgent needs. For medical emergencies, dial 911. Now available from your iPhone and Android! Please provide this summary of care documentation to your next provider. Your primary care clinician is listed as Ankush Moser. If you have any questions after today's visit, please call [phone].

## 2017-11-06 NOTE — TELEPHONE ENCOUNTER
Patient is requesting a refill of his medication. Requested Prescriptions     Pending Prescriptions Disp Refills    budesonide-formoterol (SYMBICORT) 160-4.5 mcg/actuation HFAA       Sig: Take 2 Puffs by inhalation two (2) times a day.

## 2017-11-06 NOTE — TELEPHONE ENCOUNTER
Patient is requesting a refill of his medication. Requested Prescriptions     Pending Prescriptions Disp Refills    budesonide-formoterol (SYMBICORT) 80-4.5 mcg/actuation HFAA       Sig: Take 2 Puffs by inhalation two (2) times a day.

## 2017-11-06 NOTE — PROGRESS NOTES
HISTORY OF PRESENT ILLNESS  Agustín Garcia is a 45 y.o. male. HPI Comments: Mr. Lelia Burns is here to get a refill of his pain medication. He has chronic pain in both his neck and lower back. He never did hear from pain management but does ok with 2-3 tylenol #3s per day. He did see Dr. Cinthia Gitelman and a MRI of his lower back was ordered and the MRI of cervical spine was reviewed. Mr. Lelia Burns mentioned to Dr. Cinthia Gitelman that while he was living in [place] he was told that he had some type of posterior circulation aneurysm and was followed by neurology and vascular surgery. Dr. Cinthia Gitelman believes this could be contributing to some of his neck pain. Medication Refill   Pertinent negatives include no chest pain, no abdominal pain, no headaches and no shortness of breath. Neck Pain   Pertinent negatives include no chest pain, no abdominal pain, no headaches and no shortness of breath. Back Pain    Pertinent negatives include no chest pain, no fever, no headaches and no abdominal pain. Review of Systems   Constitutional: Negative for chills and fever. Eyes: Negative for blurred vision and double vision. Respiratory: Negative for cough and shortness of breath. Cardiovascular: Negative for chest pain and palpitations. Gastrointestinal: Negative for abdominal pain, nausea and vomiting. Musculoskeletal: Positive for back pain and neck pain. Neurological: Positive for sensory change (numbness in leg). Negative for headaches. Physical Exam   Constitutional: He is oriented to person, place, and time. He appears well-developed and well-nourished. Eyes: Pupils are equal, round, and reactive to light. Neck: Neck supple. No thyromegaly present. Cardiovascular: Normal rate and regular rhythm. Pulmonary/Chest: Effort normal and breath sounds normal. No respiratory distress. He has no wheezes. Abdominal: Soft. There is no tenderness.    Musculoskeletal:   Tender mid-upper lumbar spine and surrounding muscles. Decreased ROM due to pain. Neck non-tender. Lymphadenopathy:     He has no cervical adenopathy. Neurological: He is alert and oriented to person, place, and time. Nursing note and vitals reviewed. Results for orders placed or performed in visit on 11/06/17   AMB POC DRUG SCREEN ()   Result Value Ref Range    ALCOHOL UR POC      AMPHETAMINES UR POC Negative     CARISOPRODOL UR POC      COCAINE UR POC Negative     FENTANYL UR POC      MDMA/ECSTASY UR POC Negative     METHADONE UR POC Negative     METHAMPHETAMINE UR POC Negative     METHYLPHENIDATE UR POC Negative     OPIATES UR POC Negative     OXYCODONE UR POC Negative     PHENCYCLIDINE UR POC      PROPOXYPHENE UR POC      TRAMADOL UR POC Negative     TRICYCLICS UR POC      BARBITURATES UR POC Negative     BENZODIAZEPINES UR POC Negative     CANNABINOIDS UR POC Negative     BUPRENORPHINE UR POC          reviewed, no aberration. Pain contract signed, UDS done  ASSESSMENT and PLAN    ICD-10-CM ICD-9-CM    1. Chronic midline low back pain without sciatica M54.5 724.2 AMB POC DRUG SCREEN ()    G89.29 338.29 acetaminophen-codeine (TYLENOL-CODEINE #3) 300-30 mg per tablet   2. Degenerative disc disease, cervical M50.30 722.4 AMB POC DRUG SCREEN ()      acetaminophen-codeine (TYLENOL-CODEINE #3) 300-30 mg per tablet   3. Encounter for chronic pain management G89.29 V65.49 AMB POC DRUG SCREEN ()      acetaminophen-codeine (TYLENOL-CODEINE #3) 300-30 mg per tablet   4. 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

## 2017-11-06 NOTE — LETTER
AGREEMENT for controlled medication treatment Cristiano , have agreed to a course of treatment that includes taking controlled medication. For this treatment I designate _____________________________________ as the [facility] Provider.  The purpose of this agreement is to prevent misunderstandings about controlled medications I may be taking for treatment, and to comply with applicable laws. I understand this agreement is essential to the trust and confidence necessary in a provider-patient relationship and that the designated provider will treat me in accordance with the statements below. As part of my treatment I agree to the followin.  USE 
a. I will take the controlled medication as instructed by the designated provider and avoid improper use of controlled medications. b.   I will not share, sell or trade controlled medication with anyone as this is a criminal offense.  
c.   I will not use any illegal controlled substance, including marijuana, cocaine, etc. as this is a criminal offense. 2.  PROVIDERS 
a. I will only obtain controlled medication from the designated provider. b.   I will not attempt to obtain the same or similar controlled medications, such as opioid pain medication, stimulants, anti-anxiety or hypnotics from any other provider as this is a criminal offense. 
c.   I will inform the designated provider about all other licensed professionals providing medical care to me and authorize communication between all providers to coordinate care, particularly prescribing or dispensing of controlled medications. 3.  PHARMACY 
a.   I will only fill controlled medication prescriptions at the approved pharmacy as listed below. 4.  OFFICE VISITS 
a. I agree to attend scheduled office visit appointments. b.   I am aware my office visits may be monthly or as determined necessary by the designated provider. c.   I will communicate fully with the designated provider about the character and intensity of my symptoms, the effect of the symptoms on my daily life, and how well the controlled medication is helping to relieve the cause of my symptoms. 5.  REFILLS OR CALL-IN PRESCRIPTIONS OF CONTROLLED MEDICATIONS 
a. I agree that refills of my prescriptions for controlled medications will be made at the time of an office visit during regular office hours. No refills will be available during evenings or on weekends. Abusive or inappropriate behavior related to medication refills will not be tolerated. b.   I will not call the office repeatedly to inquire about my controlled medication. I understand that medications will be written on the due date and not before. Calling the office repeatedly will be considered harassment, and I may be discharged from the practice. c.   Controlled medications may not be called in for refill, but doing so is at the discretion of the designated provider. d.   I am aware that only I must  prescriptions for controlled medications at the office but the designated provider may allow a designee to  the prescription from the office under very specific circumstance that may develop. 6.  TOXICOLOGY SCREENING 
a. I agree to random urine toxicology screenings in order to comply with government and [address] [place] [address] regulations. I understand that I will be financially responsible for any charges incurred by this office, Lucrecia Guardado of [facility] laboratory, [employer], or [facility] for the urine toxicology screening, which may not be covered by my insurance. Failure to do so will be considered non-compliance and I may be discharged. b. In some cases an oral swab or hair sample may be substituted for a urine screen. This is at the discretion of the designated provider. I understand that I will be financially responsible for any charges incurred as well. c.   I understand that if the urine toxicology does not show my medications prescribed to me by the designated provider, or it shows any illegal substance or any other medications NOT prescribed by the designated providers, I may be discharged from this practice at the discretion of the designated provider and no further controlled medications will be prescribed or follow-up appointments scheduled. Also, if any illegal substances are detected on the urine toxicology, this information may be provided to local law enforcement. 7. PILL COUNTS 
a. I am aware I may be called at any time to come into the office for a count of all my remaining controlled medications in order to help the designated provider understand the rate at which I use my controlled medications and to more effectively adjust dosage. b. I agree that I will use my medication at a rate NO greater than the prescribed rate and that use of my medication at a greater rate will result in my being without medication for the period of time until next expected due date. c. I will bring in the containers with the medication prescribed by all providers, including the designated provider, to each office visit even if there is no medication remaining. All controlled medication will be in the original containers from the pharmacy for each medication. Failure to do so will be considered non-compliance and I may be discharged from the practice. 8.  LOSS OR THEFT OF MEDICATION 
a. I will safeguard my controlled medication from loss or theft. b.   Lost or stolen controlled medication may not be replaced. This includes a prescription that has not yet been filled at the pharmacy. c.   In the event my controlled medications are stolen or lost, I will notify the designated providers office immediately.   If such event occurs during the night, weekend or holiday, I will leave a detailed message on the answering machine or answering service at the number listed above. 
d.   I will file and produce an official police report for any effort to replace controlled medications prescribed. 9.  AGENCY COLLABORATION I authorize the designated provider and the authorized pharmacy/pharmacist to cooperate fully with any city, state, or federal law enforcement agency in the investigation of any possible misuse, sale or other diversion of my controlled medication. 10.  TREATMENT I understand that if I violate any of the conditions, my controlled medication and/or treatment will be terminated. If the violation involves obtaining controlled substances and/or dangerous drugs from another source, the incident may be reported to other medical facilities and authorities, including law enforcement. In this case, the designated provider may taper off the medication over a period of several days, as necessary, to avoid withdrawal symptoms or will suggest alternate treatment facilities. Also, a drug dependence treatment program may be recommended. 11.  AGREEMENT I agree to follow these guidelines that have been fully explained to me. All of my questions and concerns regarding treatment have been adequately answered. A copy of this document has been given to me. I agree to use ______________________________ Authorized Pharmacy located at _________________________________________________________________ Telephone ________________________________for filling ALL controlled medication prescriptions. This agreement is entered into on 11/6/2017 Patient signature__________________________________________________________ Legal Guardian signature___________________________________________________ Provider signature_________________________________________________________ Witness signature_________________________________________________________

## 2017-11-06 NOTE — PATIENT INSTRUCTIONS
Take the medication as prescribed. I am referring you to a neurosurgeon because of the thing in your head.  You will need to get your records as soon as possible, certainly before seeing the specialist.

## 2017-11-30 DIAGNOSIS — G47.00 INSOMNIA, UNSPECIFIED TYPE: ICD-10-CM

## 2017-12-01 RX ORDER — ZOLPIDEM TARTRATE 5 MG/1
TABLET ORAL
Qty: 30 TAB | Refills: 0 | Status: SHIPPED | OUTPATIENT
Start: 2017-12-01 | End: 2017-12-30 | Stop reason: SDUPTHER

## 2017-12-30 DIAGNOSIS — M50.30 DEGENERATIVE DISC DISEASE, CERVICAL: ICD-10-CM

## 2017-12-30 DIAGNOSIS — G47.00 INSOMNIA, UNSPECIFIED TYPE: ICD-10-CM

## 2017-12-30 DIAGNOSIS — M54.50 CHRONIC MIDLINE LOW BACK PAIN WITHOUT SCIATICA: ICD-10-CM

## 2017-12-30 DIAGNOSIS — G89.29 CHRONIC MIDLINE LOW BACK PAIN WITHOUT SCIATICA: ICD-10-CM

## 2017-12-30 DIAGNOSIS — G89.29 ENCOUNTER FOR CHRONIC PAIN MANAGEMENT: ICD-10-CM

## 2018-01-02 DIAGNOSIS — G47.00 INSOMNIA, UNSPECIFIED TYPE: ICD-10-CM

## 2018-01-02 RX ORDER — ZOLPIDEM TARTRATE 5 MG/1
TABLET ORAL
Qty: 30 TAB | Refills: 0 | OUTPATIENT
Start: 2018-01-02

## 2018-01-02 RX ORDER — ZOLPIDEM TARTRATE 5 MG/1
TABLET ORAL
Qty: 30 TAB | Refills: 0 | Status: SHIPPED | OUTPATIENT
Start: 2018-01-02 | End: 2018-02-05 | Stop reason: SDUPTHER

## 2018-01-02 RX ORDER — ACETAMINOPHEN AND CODEINE PHOSPHATE 300; 30 MG/1; MG/1
TABLET ORAL
Qty: 90 TAB | Refills: 0 | Status: SHIPPED | OUTPATIENT
Start: 2018-01-02 | End: 2018-02-05 | Stop reason: SDUPTHER

## 2018-02-02 DIAGNOSIS — M54.50 CHRONIC MIDLINE LOW BACK PAIN WITHOUT SCIATICA: ICD-10-CM

## 2018-02-02 DIAGNOSIS — M50.30 DEGENERATIVE DISC DISEASE, CERVICAL: ICD-10-CM

## 2018-02-02 DIAGNOSIS — G47.00 INSOMNIA, UNSPECIFIED TYPE: ICD-10-CM

## 2018-02-02 DIAGNOSIS — G89.29 CHRONIC MIDLINE LOW BACK PAIN WITHOUT SCIATICA: ICD-10-CM

## 2018-02-02 DIAGNOSIS — G89.29 ENCOUNTER FOR CHRONIC PAIN MANAGEMENT: ICD-10-CM

## 2018-02-02 RX ORDER — ACETAMINOPHEN AND CODEINE PHOSPHATE 300; 30 MG/1; MG/1
TABLET ORAL
Qty: 90 TAB | Refills: 0 | OUTPATIENT
Start: 2018-02-02

## 2018-02-02 RX ORDER — GABAPENTIN 300 MG/1
300 CAPSULE ORAL 2 TIMES DAILY
Qty: 180 CAP | Refills: 0 | OUTPATIENT
Start: 2018-02-02

## 2018-02-02 RX ORDER — ZOLPIDEM TARTRATE 5 MG/1
TABLET ORAL
Qty: 30 TAB | Refills: 0 | OUTPATIENT
Start: 2018-02-02

## 2018-02-02 NOTE — TELEPHONE ENCOUNTER
Requested Prescriptions     Pending Prescriptions Disp Refills    acetaminophen-codeine (TYLENOL #3) 300-30 mg per tablet 90 Tab 0    gabapentin (NEURONTIN) 300 mg capsule 180 Cap 0     Sig: Take 1 Cap by mouth two (2) times a day.     zolpidem (AMBIEN) 5 mg tablet 30 Tab 0 Patient

## 2018-02-05 ENCOUNTER — HOSPITAL ENCOUNTER (OUTPATIENT)
Dept: LAB | Age: 53
Discharge: HOME | End: 2018-02-05
Payer: MEDICARE

## 2018-02-05 ENCOUNTER — OFFICE VISIT (OUTPATIENT)
Dept: FAMILY MEDICINE CLINIC | Age: 53
End: 2018-02-05

## 2018-02-05 VITALS
TEMPERATURE: 96.8 F | RESPIRATION RATE: 16 BRPM | BODY MASS INDEX: 38.6 KG/M2 | DIASTOLIC BLOOD PRESSURE: 105 MMHG | HEIGHT: 72 IN | WEIGHT: 285 LBS | HEART RATE: 77 BPM | SYSTOLIC BLOOD PRESSURE: 149 MMHG | OXYGEN SATURATION: 96 %

## 2018-02-05 DIAGNOSIS — Z79.899 HIGH RISK MEDICATION USE: ICD-10-CM

## 2018-02-05 DIAGNOSIS — G89.29 CHRONIC MIDLINE LOW BACK PAIN WITHOUT SCIATICA: Primary | ICD-10-CM

## 2018-02-05 DIAGNOSIS — Z12.5 PROSTATE CANCER SCREENING: ICD-10-CM

## 2018-02-05 DIAGNOSIS — Z11.59 NEED FOR HEPATITIS C SCREENING TEST: ICD-10-CM

## 2018-02-05 DIAGNOSIS — R03.0 ELEVATED BP WITHOUT DIAGNOSIS OF HYPERTENSION: ICD-10-CM

## 2018-02-05 DIAGNOSIS — Z71.89 ADVANCED CARE PLANNING/COUNSELING DISCUSSION: ICD-10-CM

## 2018-02-05 DIAGNOSIS — G89.29 ENCOUNTER FOR CHRONIC PAIN MANAGEMENT: ICD-10-CM

## 2018-02-05 DIAGNOSIS — Z13.6 SCREENING FOR ISCHEMIC HEART DISEASE: ICD-10-CM

## 2018-02-05 DIAGNOSIS — M50.30 DEGENERATIVE DISC DISEASE, CERVICAL: ICD-10-CM

## 2018-02-05 DIAGNOSIS — Z00.00 ROUTINE GENERAL MEDICAL EXAMINATION AT A HEALTH CARE FACILITY: ICD-10-CM

## 2018-02-05 DIAGNOSIS — G47.00 INSOMNIA, UNSPECIFIED TYPE: ICD-10-CM

## 2018-02-05 DIAGNOSIS — I67.1 ANEURYSM OF POSTERIOR CEREBRAL ARTERY: ICD-10-CM

## 2018-02-05 DIAGNOSIS — M54.50 CHRONIC MIDLINE LOW BACK PAIN WITHOUT SCIATICA: Primary | ICD-10-CM

## 2018-02-05 LAB
ALBUMIN SERPL-MCNC: 4 G/DL (ref 3.4–5)
ALBUMIN/GLOB SERPL: 1.2 {RATIO} (ref 0.8–1.7)
ALP SERPL-CCNC: 84 U/L (ref 45–117)
ALT SERPL-CCNC: 36 U/L (ref 16–61)
ANION GAP SERPL CALC-SCNC: 9 MMOL/L (ref 3–18)
AST SERPL-CCNC: 20 U/L (ref 15–37)
BILIRUB SERPL-MCNC: 0.4 MG/DL (ref 0.2–1)
BUN SERPL-MCNC: 21 MG/DL (ref 7–18)
BUN/CREAT SERPL: 17 (ref 12–20)
CALCIUM SERPL-MCNC: 8.9 MG/DL (ref 8.5–10.1)
CHLORIDE SERPL-SCNC: 106 MMOL/L (ref 100–108)
CHOLEST SERPL-MCNC: 184 MG/DL
CO2 SERPL-SCNC: 27 MMOL/L (ref 21–32)
CREAT SERPL-MCNC: 1.27 MG/DL (ref 0.6–1.3)
GLOBULIN SER CALC-MCNC: 3.3 G/DL (ref 2–4)
GLUCOSE SERPL-MCNC: 104 MG/DL (ref 74–99)
HDLC SERPL-MCNC: 16 MG/DL (ref 40–60)
HDLC SERPL: 11.5 {RATIO} (ref 0–5)
LDLC SERPL CALC-MCNC: 91 MG/DL (ref 0–100)
LIPID PROFILE,FLP: ABNORMAL
POTASSIUM SERPL-SCNC: 4.4 MMOL/L (ref 3.5–5.5)
PROT SERPL-MCNC: 7.3 G/DL (ref 6.4–8.2)
PSA SERPL-MCNC: 4.8 NG/ML (ref 0–4)
SODIUM SERPL-SCNC: 142 MMOL/L (ref 136–145)
TRIGL SERPL-MCNC: 385 MG/DL (ref ?–150)
VLDLC SERPL CALC-MCNC: 77 MG/DL

## 2018-02-05 PROCEDURE — 80307 DRUG TEST PRSMV CHEM ANLYZR: CPT | Performed by: FAMILY MEDICINE

## 2018-02-05 PROCEDURE — 80053 COMPREHEN METABOLIC PANEL: CPT | Performed by: FAMILY MEDICINE

## 2018-02-05 PROCEDURE — 86803 HEPATITIS C AB TEST: CPT | Performed by: FAMILY MEDICINE

## 2018-02-05 PROCEDURE — 80061 LIPID PANEL: CPT | Performed by: FAMILY MEDICINE

## 2018-02-05 PROCEDURE — 84153 ASSAY OF PSA TOTAL: CPT | Performed by: FAMILY MEDICINE

## 2018-02-05 RX ORDER — ZOLPIDEM TARTRATE 5 MG/1
TABLET ORAL
Qty: 30 TAB | Refills: 0 | Status: SHIPPED | OUTPATIENT
Start: 2018-02-05 | End: 2018-03-05 | Stop reason: SDUPTHER

## 2018-02-05 RX ORDER — ACETAMINOPHEN AND CODEINE PHOSPHATE 300; 30 MG/1; MG/1
TABLET ORAL
Qty: 90 TAB | Refills: 0 | Status: SHIPPED | OUTPATIENT
Start: 2018-02-05 | End: 2018-03-05 | Stop reason: SDUPTHER

## 2018-02-05 RX ORDER — GABAPENTIN 300 MG/1
300 CAPSULE ORAL 2 TIMES DAILY
Qty: 180 CAP | Refills: 0 | Status: SHIPPED | OUTPATIENT
Start: 2018-02-05 | End: 2018-05-07 | Stop reason: ALTCHOICE

## 2018-02-05 NOTE — MR AVS SNAPSHOT
[facility] 
 
 
 Annie Graham [address] [place] [phone] 
[phone] Patient: Dominga Strickland MRN: [id number] :1965 Visit Information Date & Time Provider Department Dept. Phone Encounter #  
 2018  7:15 AM Fauzia Aldridge MD [facility] [phone] [id number] Your Appointments 2018 11:30 AM  
ULTRASOUND with [facility] ULTRASOUND [facility] (Jasper Skeans) Appt Note: scrotal us- GRE  
 [address] [address] [address]  
  
   
 [place] [address] [phone]  
  
    
 2018  1:00 PM  
ESTABLISHED PATIENT with Joselo Soriano MD  
[facility] [place] Ske[place]) Appt Note: scrotal US and return to review in 1 month. [address] [facility] [id number]  
  
    
 10/18/2018 10:15 AM  
ESTABLISHED PATIENT with Joselo Soriano MD  
[facility] [address] J[place] Skeans) Appt Note: Return in about 1 year (around 10/19/2018) for Flow/PVR, PSA prior, BPH, Elevated PSA  
 [address] [address]  
[phone] Upcoming Health Maintenance Date Due Hepatitis C Screening 1965 DTaP/Tdap/Td series (1 - Tdap) 1985 COLONOSCOPY 2022 Allergies as of 2018  Review Complete On: 2018 By: Fauzia Aldridge MD  
 No Known Allergies Current Immunizations  Never Reviewed No immunizations on file. Not reviewed this visit You Were Diagnosed With   
  
 Codes Comments Chronic midline low back pain without sciatica    -  Primary ICD-10-CM: M54.5, G89.29 ICD-9-CM: 724.2, 338.29 Encounter for chronic pain management     ICD-10-CM: G89.29 ICD-9-CM: V65.49 Insomnia, unspecified type     ICD-10-CM: G47.00 ICD-9-CM: 780.52   
 Degenerative disc disease, cervical     ICD-10-CM: M50.30 ICD-9-CM: 722.4 High risk medication use     ICD-10-CM: Z79.899 ICD-9-CM: V58.69 Screening for ischemic heart disease     ICD-10-CM: Z13.6 ICD-9-CM: V81.0 Elevated BP without diagnosis of hypertension     ICD-10-CM: R03.0 ICD-9-CM: 796.2 Prostate cancer screening     ICD-10-CM: Z12.5 ICD-9-CM: V76.44 Need for hepatitis C screening test     ICD-10-CM: Z11.59 
ICD-9-CM: V73.89 Advanced care planning/counseling discussion     ICD-10-CM: Z71.89 ICD-9-CM: V65.49 Routine general medical examination at a health care facility     ICD-10-CM: Z00.00 ICD-9-CM: V70.0 Vitals BP Pulse Temp Resp Height(growth percentile) Weight(growth percentile) (!) 149/105 (BP 1 Location: Right arm, BP Patient Position: Sitting) 77 96.8 °F (36 °C) (Oral) 16 6' (1.829 m) 285 lb (129.3 kg) SpO2 BMI Smoking Status 96% 38.65 kg/m2 Former Smoker Vitals History BMI and BSA Data Body Mass Index Body Surface Area  
 38.65 kg/m 2 2.56 m 2 Preferred Pharmacy Pharmacy Name Phone [facility], [address] [place] [address] [phone] Your Updated Medication List  
  
   
This list is accurate as of: 2/5/18  7:57 AM.  Always use your most recent med list.  
  
  
  
  
 acetaminophen-codeine 300-30 mg per tablet Commonly known as:  TYLENOL #3  
TAKE ONE TABLET BY MOUTH THREE TIMES DAILY AS NEEDED FOR PAIN (MAX DAILY AMOUNT: 3 TABS)  
  
 albuterol 90 mcg/actuation inhaler Commonly known as:  PROVENTIL HFA, VENTOLIN HFA, PROAIR HFA Take  by inhalation. allopurinol 100 mg tablet Commonly known as:  Austyn Paling Take 1 Tab by mouth daily. Indications: prevention of acute gout attack ANTIFUNGAL EX  
by Apply Externally route. budesonide-formoterol 80-4.5 mcg/actuation Hfaa Commonly known as:  SYMBICORT  
 Take 2 Puffs by inhalation two (2) times a day. ciclopirox 0.77 % topical cream  
Commonly known as:  Rolf Bovill Apply  to affected area two (2) times a day. cyclobenzaprine 10 mg tablet Commonly known as:  FLEXERIL Take  by mouth three (3) times daily as needed for Muscle Spasm(s). gabapentin 300 mg capsule Commonly known as:  NEURONTIN Take 1 Cap by mouth two (2) times a day. mometasone 0.1 % ointment Commonly known as:  Arloa Heys Apply  to affected area daily. predniSONE 10 mg dose pack Commonly known as:  STERAPRED DS See administration instruction per 10mg dose pack  
  
 tamsulosin 0.4 mg capsule Commonly known as:  FLOMAX Take 1 Cap by mouth daily (after dinner). zolpidem 5 mg tablet Commonly known as:  AMBIEN  
TAKE ONE TABLET BY MOUTH NIGHTLY AS NEEDED FOR SLEEP. MAXIMUM DAILY AMOUNT: 5 MG  
  
  
  
  
Prescriptions Printed Refills  
 zolpidem (AMBIEN) 5 mg tablet 0 Sig: TAKE ONE TABLET BY MOUTH NIGHTLY AS NEEDED FOR SLEEP. MAXIMUM DAILY AMOUNT: 5 MG Class: Print  
 acetaminophen-codeine (TYLENOL #3) 300-30 mg per tablet 0 Sig: TAKE ONE TABLET BY MOUTH THREE TIMES DAILY AS NEEDED FOR PAIN (MAX DAILY AMOUNT: 3 TABS) Class: Print Prescriptions Sent to Pharmacy Refills  
 gabapentin (NEURONTIN) 300 mg capsule 0 Sig: Take 1 Cap by mouth two (2) times a day. Class: Normal  
 Pharmacy: [facility] [address] [place] [address] #: [phone] Route: Oral  
  
To-Do List   
 02/05/2018 Lab:  COMPLIANCE DRUG SCREEN/PRESCRIPTION MONITORING   
  
 02/05/2018 Lab:  HEPATITIS C AB   
  
 02/05/2018 Lab:  LIPID PANEL   
  
 02/05/2018 Lab:  METABOLIC PANEL, COMPREHENSIVE   
  
 02/05/2018 Lab:  PSA SCREENING (SCREENING) Patient Instructions We will call you about the lab results tomorrow. Read the booklet about Advanced Medical Directives and work on getting one done. Try to get a copy of your last colonoscopy report for us so we can update that. You may also be due for an eye exam, you should have glaucoma screening every 2 years. Recheck 3 months. Introducing [facility] SERVICES! James Reed introduces [employer] patient portal. Now you can access parts of your medical record, email your doctor's office, and request medication refills online. 1. In your internet browser, go to [email or web address] 2. Click on the First Time User? Click Here link in the Sign In box. You will see the New Member Sign Up page. 3. Enter your [employer] Access Code exactly as it appears below. You will not need to use this code after youve completed the sign-up process. If you do not sign up before the expiration date, you must request a new code. · [employer] Access Code: [email or web address] Expires: 4/17/2018  9:54 AM 
 
4. Enter the last four digits of your Social Security Number (xxxx) and Date of Birth (mm/dd/yyyy) as indicated and click Submit. You will be taken to the next sign-up page. 5. Create a [employer] ID. This will be your [employer] login ID and cannot be changed, so think of one that is secure and easy to remember. 6. Create a [employer] password. You can change your password at any time. 7. Enter your Password Reset Question and Answer. This can be used at a later time if you forget your password. 8. Enter your e-mail address. You will receive e-mail notification when new information is available in [address] 9. Click Sign Up. You can now view and download portions of your medical record. 10. Click the Download Summary menu link to download a portable copy of your medical information. If you have questions, please visit the Frequently Asked Questions section of the [employer] website. Remember, [employer] is NOT to be used for urgent needs. For medical emergencies, dial 911. Now available from your iPhone and Android! Please provide this summary of care documentation to your next provider. Your primary care clinician is listed as Ankush Moser. If you have any questions after today's visit, please call [phone].

## 2018-02-05 NOTE — LETTER
AGREEMENT for controlled medication treatment Brookaris Conroy., have agreed to a course of treatment that includes taking controlled medication. For this treatment I designate _____________________________________ as the [facility] Provider.  The purpose of this agreement is to prevent misunderstandings about controlled medications I may be taking for treatment, and to comply with applicable laws. I understand this agreement is essential to the trust and confidence necessary in a provider-patient relationship and that the designated provider will treat me in accordance with the statements below. As part of my treatment I agree to the followin.  USE 
a. I will take the controlled medication as instructed by the designated provider and avoid improper use of controlled medications. b.   I will not share, sell or trade controlled medication with anyone as this is a criminal offense.  
c.   I will not use any illegal controlled substance, including marijuana, cocaine, etc. as this is a criminal offense. 2.  PROVIDERS 
a. I will only obtain controlled medication from the designated provider. b.   I will not attempt to obtain the same or similar controlled medications, such as opioid pain medication, stimulants, anti-anxiety or hypnotics from any other provider as this is a criminal offense. 
c.   I will inform the designated provider about all other licensed professionals providing medical care to me and authorize communication between all providers to coordinate care, particularly prescribing or dispensing of controlled medications. 3.  PHARMACY 
a.   I will only fill controlled medication prescriptions at the approved pharmacy as listed below. 4.  OFFICE VISITS 
a. I agree to attend scheduled office visit appointments. b.   I am aware my office visits may be monthly or as determined necessary by the designated provider. c.   I will communicate fully with the designated provider about the character and intensity of my symptoms, the effect of the symptoms on my daily life, and how well the controlled medication is helping to relieve the cause of my symptoms. 5.  REFILLS OR CALL-IN PRESCRIPTIONS OF CONTROLLED MEDICATIONS 
a. I agree that refills of my prescriptions for controlled medications will be made at the time of an office visit during regular office hours. No refills will be available during evenings or on weekends. Abusive or inappropriate behavior related to medication refills will not be tolerated. b.   I will not call the office repeatedly to inquire about my controlled medication. I understand that medications will be written on the due date and not before. Calling the office repeatedly will be considered harassment, and I may be discharged from the practice. c.   Controlled medications may not be called in for refill, but doing so is at the discretion of the designated provider. d.   I am aware that only I must  prescriptions for controlled medications at the office but the designated provider may allow a designee to  the prescription from the office under very specific circumstance that may develop. 6.  TOXICOLOGY SCREENING 
a. I agree to random urine toxicology screenings in order to comply with government and [address] [place] [address] regulations. I understand that I will be financially responsible for any charges incurred by this office, Rebecca Savage of [facility] laboratory, [employer], or [facility] for the urine toxicology screening, which may not be covered by my insurance. Failure to do so will be considered non-compliance and I may be discharged. b. In some cases an oral swab or hair sample may be substituted for a urine screen. This is at the discretion of the designated provider. I understand that I will be financially responsible for any charges incurred as well. c.   I understand that if the urine toxicology does not show my medications prescribed to me by the designated provider, or it shows any illegal substance or any other medications NOT prescribed by the designated providers, I may be discharged from this practice at the discretion of the designated provider and no further controlled medications will be prescribed or follow-up appointments scheduled. Also, if any illegal substances are detected on the urine toxicology, this information may be provided to local law enforcement. 7. PILL COUNTS 
a. I am aware I may be called at any time to come into the office for a count of all my remaining controlled medications in order to help the designated provider understand the rate at which I use my controlled medications and to more effectively adjust dosage. b. I agree that I will use my medication at a rate NO greater than the prescribed rate and that use of my medication at a greater rate will result in my being without medication for the period of time until next expected due date. c. I will bring in the containers with the medication prescribed by all providers, including the designated provider, to each office visit even if there is no medication remaining. All controlled medication will be in the original containers from the pharmacy for each medication. Failure to do so will be considered non-compliance and I may be discharged from the practice. 8.  LOSS OR THEFT OF MEDICATION 
a. I will safeguard my controlled medication from loss or theft. b.   Lost or stolen controlled medication may not be replaced. This includes a prescription that has not yet been filled at the pharmacy. c.   In the event my controlled medications are stolen or lost, I will notify the designated providers office immediately.   If such event occurs during the night, weekend or holiday, I will leave a detailed message on the answering machine or answering service at the number listed above. 
d.   I will file and produce an official police report for any effort to replace controlled medications prescribed. 9.  AGENCY COLLABORATION I authorize the designated provider and the authorized pharmacy/pharmacist to cooperate fully with any city, state, or federal law enforcement agency in the investigation of any possible misuse, sale or other diversion of my controlled medication. 10.  TREATMENT I understand that if I violate any of the conditions, my controlled medication and/or treatment will be terminated. If the violation involves obtaining controlled substances and/or dangerous drugs from another source, the incident may be reported to other medical facilities and authorities, including law enforcement. In this case, the designated provider may taper off the medication over a period of several days, as necessary, to avoid withdrawal symptoms or will suggest alternate treatment facilities. Also, a drug dependence treatment program may be recommended. 11.  AGREEMENT I agree to follow these guidelines that have been fully explained to me. All of my questions and concerns regarding treatment have been adequately answered. A copy of this document has been given to me. I agree to use ______________________________ Authorized Pharmacy located at _________________________________________________________________ Telephone ________________________________for filling ALL controlled medication prescriptions. This agreement is entered into on 2/5/2018 Patient signature__________________________________________________________ Legal Guardian signature___________________________________________________ Provider signature_________________________________________________________ Witness signature_________________________________________________________

## 2018-02-05 NOTE — PATIENT INSTRUCTIONS
We will call you about the lab results tomorrow. Read the booklet about Advanced Medical Directives and work on getting one done. Try to get a copy of your last colonoscopy report for us so we can update that. You may also be due for an eye exam, you should have glaucoma screening every 2 years. Recheck 3 months.

## 2018-02-05 NOTE — PROGRESS NOTES
Gerardo Hughes is a 47 y.o. male and presents for annual Medicare Wellness Visit. Problem List: Reviewed with patient and discussed risk factors. Patient Active Problem List   Diagnosis Code    Elevated PSA R97.20    Cervicalgia M54.2    Urge incontinence N39.41    Neuralgia M79.2    Hearing loss H91.90    Gout M10.9    Benign prostatic hyperplasia with lower urinary tract symptoms N40.1    Screening for depression Z13.89    Chronic midline low back pain without sciatica M54.5, G89.29    Advanced care planning/counseling discussion Z71.89       Current medical providers:  Patient Care Team:  Laural Brittle, MD as PCP - General (Family Practice)    PSH: Reviewed with patient  Past Surgical History:   Procedure Laterality Date    HX HERNIA REPAIR      HX TONSILLECTOMY      HX UROLOGICAL  8/27/15    PNBx-TRUS Vol 47 cc's, Benign, Dr. Jossie Pradhan          SH: Reviewed with patient  Social History   Substance Use Topics    Smoking status: Former Smoker    Smokeless tobacco: Never Used    Alcohol use 12.6 oz/week     14 Glasses of wine, 7 Shots of liquor per week       FH: Reviewed with patient  Family History   Problem Relation Age of Onset    Cancer Mother     No Known Problems Father     Asthma Sister     Stroke Maternal Grandmother        Medications/Allergies: Reviewed with patient  Current Outpatient Prescriptions on File Prior to Visit   Medication Sig Dispense Refill    budesonide-formoterol (SYMBICORT) 80-4.5 mcg/actuation HFAA Take 2 Puffs by inhalation two (2) times a day. 1 Inhaler 5    allopurinol (ZYLOPRIM) 100 mg tablet Take 1 Tab by mouth daily. Indications: prevention of acute gout attack 90 Tab 1    albuterol (PROVENTIL HFA, VENTOLIN HFA, PROAIR HFA) 90 mcg/actuation inhaler Take  by inhalation.  tamsulosin (FLOMAX) 0.4 mg capsule Take 1 Cap by mouth daily (after dinner).  90 Cap 3    predniSONE (STERAPRED DS) 10 mg dose pack See administration instruction per 10mg dose pack 21 Tab 0    ciclopirox (LOPROX) 0.77 % topical cream Apply  to affected area two (2) times a day.  cyclobenzaprine (FLEXERIL) 10 mg tablet Take  by mouth three (3) times daily as needed for Muscle Spasm(s).  mometasone (ELOCON) 0.1 % ointment Apply  to affected area daily.  UNDECYLENIC ACID/ZN UNDECYL (ANTIFUNGAL EX) by Apply Externally route. No current facility-administered medications on file prior to visit. No Known Allergies    Objective:  Visit Vitals    BP (!) 149/105 (BP 1 Location: Right arm, BP Patient Position: Sitting)    Pulse 77    Temp 96.8 °F (36 °C) (Oral)    Resp 16    Ht 6' (1.829 m)    Wt 285 lb (129.3 kg)    SpO2 96%    BMI 38.65 kg/m2    Body mass index is 38.65 kg/(m^2). Assessment of cognitive impairment: Alert and oriented x 3    Depression Screen:   PHQ over the last two weeks 11/6/2017   Little interest or pleasure in doing things Not at all   Feeling down, depressed or hopeless Not at all   Total Score PHQ 2 0       Fall Risk Assessment:  No flowsheet data found. Functional Ability:   Does the patient exhibit a steady gait? yes   How long did it take the patient to get up and walk from a sitting position? 5 secs   Is the patient self reliant?  (ie can do own laundry, meals, household chores)  yes     Does the patient handle his/her own medications? yes     Does the patient handle his/her own money? yes     Is the patients home safe (ie good lighting, handrails on stairs and bath, etc.)? yes     Did you notice or did patient express any hearing difficulties? Yes, wears hearing aids     Did you notice or did patient express any vision difficulties? Yes, wears glasses. Were distance and reading eye charts used?   no       Advance Care Planning:   Patient was offered the opportunity to discuss advance care planning:  yes     Does patient have an Advance Directive:  no   If no, did you provide information on Caring Connections? yes       Plan:      Orders Placed This Encounter    COMPLIANCE DRUG SCREEN/PRESCRIPTION MONITORING    METABOLIC PANEL, COMPREHENSIVE    LIPID PANEL    PSA - SCREENING ()    HEPATITIS C AB    zolpidem (AMBIEN) 5 mg tablet    acetaminophen-codeine (TYLENOL #3) 300-30 mg per tablet    gabapentin (NEURONTIN) 300 mg capsule       Health Maintenance   Topic Date Due    Hepatitis C Screening  1965    DTaP/Tdap/Td series (1 - Tdap) 11/28/1985    COLONOSCOPY  02/08/2022    Influenza Age 5 to Adult  Addressed       *Patient verbalized understanding and agreement with the plan. A copy of the After Visit Summary with personalized health plan was given to the patient today.

## 2018-02-05 NOTE — PROGRESS NOTES
HISTORY OF PRESENT ILLNESS  Wan Vickers is a 47 y.o. male. HPI Comments: Mr. Eunice Bragg is in for his 3 month checkup. He ran out of his medication 3 days ago and couldn't get in so he has had nothing all weekend. He has both neck pain and lower back pain. His lower back pain is associated with some tingling in his R lateral thigh. The gabapentin has helped this tingling. He still never heard from anybody about an appt for his aneurysm. Review of Systems   Constitutional: Negative for chills and fever. HENT: Negative for hearing loss and sore throat. Eyes: Negative for blurred vision and double vision. Respiratory: Negative for cough and shortness of breath. Cardiovascular: Negative for chest pain and palpitations. Gastrointestinal: Negative for abdominal pain, nausea and vomiting. Musculoskeletal: Positive for back pain, myalgias and neck pain. Negative for falls and joint pain. Neurological: Positive for tingling. Negative for focal weakness, seizures and headaches. Physical Exam   Constitutional: He is oriented to person, place, and time. He appears well-developed and well-nourished. Eyes: Pupils are equal, round, and reactive to light. Neck: Neck supple. Cardiovascular: Normal rate and regular rhythm. Pulmonary/Chest: Effort normal and breath sounds normal. No respiratory distress. He has no wheezes. He has no rales. Abdominal: Soft. There is no tenderness. Musculoskeletal:   Tender c-spine throughout. Neck muscles tight. Tender upper L-spine, neg SLR. Decreased ROM . Lymphadenopathy:     He has no cervical adenopathy. Neurological: He is alert and oriented to person, place, and time. No cranial nerve deficit. Coordination normal.   Nursing note and vitals reviewed.  reviewed, just scripts from me  ASSESSMENT and PLAN    ICD-10-CM ICD-9-CM    1.  Chronic midline low back pain without sciatica M54.5 724.2 zolpidem (AMBIEN) 5 mg tablet    G89.29 338.29 acetaminophen-codeine (TYLENOL #3) 300-30 mg per tablet      gabapentin (NEURONTIN) 300 mg capsule      COMPLIANCE DRUG SCREEN/PRESCRIPTION MONITORING   2. Encounter for chronic pain management G89.29 V65.49 zolpidem (AMBIEN) 5 mg tablet      acetaminophen-codeine (TYLENOL #3) 300-30 mg per tablet      gabapentin (NEURONTIN) 300 mg capsule      COMPLIANCE DRUG SCREEN/PRESCRIPTION MONITORING   3. Insomnia, unspecified type G47.00 780.52 zolpidem (AMBIEN) 5 mg tablet      acetaminophen-codeine (TYLENOL #3) 300-30 mg per tablet      gabapentin (NEURONTIN) 300 mg capsule      COMPLIANCE DRUG SCREEN/PRESCRIPTION MONITORING   4. Degenerative disc disease, cervical M50.30 722.4 zolpidem (AMBIEN) 5 mg tablet      acetaminophen-codeine (TYLENOL #3) 300-30 mg per tablet      gabapentin (NEURONTIN) 300 mg capsule      COMPLIANCE DRUG SCREEN/PRESCRIPTION MONITORING   5. High risk medication use G25.348 U34.53 METABOLIC PANEL, COMPREHENSIVE   6. Screening for ischemic heart disease Z13.6 V81.0 LIPID PANEL   7. Elevated BP without diagnosis of hypertension R03.0 796.2    8. Prostate cancer screening Z12.5 V76.44 PSA SCREENING (SCREENING)   9. Need for hepatitis C screening test Z11.59 V73.89 HEPATITIS C AB   10.  Advanced care planning/counseling discussion Z71.89 V65.49

## 2018-02-05 NOTE — PROGRESS NOTES
Rm 1  Pt presents to the clinic for a follow-up regarding his back pain. Flu Shot Requested: no    Depression Screening:  PHQ over the last two weeks 11/6/2017 7/24/2017 7/18/2017   Little interest or pleasure in doing things Not at all Not at all Not at all   Feeling down, depressed or hopeless Not at all Not at all Not at all   Total Score PHQ 2 0 0 0       Learning Assessment:  Learning Assessment 7/18/2017   PRIMARY LEARNER Patient   HIGHEST LEVEL OF EDUCATION - PRIMARY LEARNER  > 4 YEARS OF COLLEGE   BARRIERS PRIMARY LEARNER NONE   CO-LEARNER CAREGIVER No   PRIMARY LANGUAGE ENGLISH   LEARNER PREFERENCE PRIMARY DEMONSTRATION   ANSWERED BY patient   RELATIONSHIP SELF       Abuse Screening:  No flowsheet data found. Health Maintenance reviewed and discussed per provider: yes     Coordination of Care:    1. Have you been to the ER, urgent care clinic since your last visit? Hospitalized since your last visit? no    2. Have you seen or consulted any other health care providers outside of the [address] [place] [address] since your last visit? Include any pap smears or colon screening.  no

## 2018-02-06 ENCOUNTER — TELEPHONE (OUTPATIENT)
Dept: FAMILY MEDICINE CLINIC | Age: 53
End: 2018-02-06

## 2018-02-06 LAB
HCV AB SER IA-ACNC: 0.05 INDEX
HCV AB SERPL QL IA: NEGATIVE
HCV COMMENT,HCGAC: NORMAL

## 2018-02-06 NOTE — PROGRESS NOTES
Call made to pt, both name and  verified. Pt was advised of previous result note, pt states that he is in agreement with the [employer], however he wants to know if there are any other options and is this a lifetime solution. I advised pt that I would send message to Dr Nava Waddell. Pt verbalized understanding.

## 2018-02-07 RX ORDER — FENOFIBRATE 145 MG/1
145 TABLET, COATED ORAL DAILY
Qty: 90 TAB | Refills: 1 | Status: SHIPPED | OUTPATIENT
Start: 2018-02-07 | End: 2018-11-05 | Stop reason: SDUPTHER

## 2018-02-12 LAB — DRUGS UR: NORMAL

## 2018-02-16 ENCOUNTER — HOSPITAL ENCOUNTER (EMERGENCY)
Age: 53
Discharge: HOME | End: 2018-02-17
Attending: EMERGENCY MEDICINE | Admitting: EMERGENCY MEDICINE
Payer: MEDICARE

## 2018-02-16 DIAGNOSIS — N17.9 AKI (ACUTE KIDNEY INJURY) (HCC): ICD-10-CM

## 2018-02-16 DIAGNOSIS — B34.9 VIRAL SYNDROME: Primary | ICD-10-CM

## 2018-02-16 DIAGNOSIS — R10.84 ABDOMINAL PAIN, GENERALIZED: ICD-10-CM

## 2018-02-16 PROCEDURE — 83735 ASSAY OF MAGNESIUM: CPT | Performed by: EMERGENCY MEDICINE

## 2018-02-16 PROCEDURE — 99284 EMERGENCY DEPT VISIT MOD MDM: CPT

## 2018-02-16 PROCEDURE — 80053 COMPREHEN METABOLIC PANEL: CPT | Performed by: EMERGENCY MEDICINE

## 2018-02-16 PROCEDURE — 83690 ASSAY OF LIPASE: CPT | Performed by: EMERGENCY MEDICINE

## 2018-02-16 PROCEDURE — 85025 COMPLETE CBC W/AUTO DIFF WBC: CPT | Performed by: EMERGENCY MEDICINE

## 2018-02-16 RX ORDER — KETOROLAC TROMETHAMINE 30 MG/ML
15 INJECTION, SOLUTION INTRAMUSCULAR; INTRAVENOUS
Status: COMPLETED | OUTPATIENT
Start: 2018-02-16 | End: 2018-02-17

## 2018-02-16 RX ORDER — ONDANSETRON 2 MG/ML
4 INJECTION INTRAMUSCULAR; INTRAVENOUS
Status: COMPLETED | OUTPATIENT
Start: 2018-02-16 | End: 2018-02-17

## 2018-02-16 NOTE — TELEPHONE ENCOUNTER
Requested Prescriptions     Pending Prescriptions Disp Refills    tamsulosin (FLOMAX) 0.4 mg capsule 90 Cap 3     Sig: Take 1 Cap by mouth daily (after dinner).

## 2018-02-17 ENCOUNTER — APPOINTMENT (OUTPATIENT)
Dept: GENERAL RADIOLOGY | Age: 53
End: 2018-02-17
Attending: EMERGENCY MEDICINE
Payer: MEDICARE

## 2018-02-17 VITALS
OXYGEN SATURATION: 97 % | SYSTOLIC BLOOD PRESSURE: 118 MMHG | RESPIRATION RATE: 15 BRPM | WEIGHT: 272 LBS | BODY MASS INDEX: 36.89 KG/M2 | DIASTOLIC BLOOD PRESSURE: 71 MMHG | TEMPERATURE: 98.9 F | HEART RATE: 81 BPM

## 2018-02-17 LAB
ALBUMIN SERPL-MCNC: 4.2 G/DL (ref 3.4–5)
ALBUMIN/GLOB SERPL: 1.2 {RATIO} (ref 0.8–1.7)
ALP SERPL-CCNC: 74 U/L (ref 45–117)
ALT SERPL-CCNC: 45 U/L (ref 16–61)
ANION GAP SERPL CALC-SCNC: 11 MMOL/L (ref 3–18)
AST SERPL-CCNC: 22 U/L (ref 15–37)
BASOPHILS # BLD: 0 K/UL (ref 0–0.06)
BASOPHILS NFR BLD: 0 % (ref 0–2)
BILIRUB SERPL-MCNC: 0.8 MG/DL (ref 0.2–1)
BUN SERPL-MCNC: 21 MG/DL (ref 7–18)
BUN/CREAT SERPL: 14 (ref 12–20)
CALCIUM SERPL-MCNC: 9.2 MG/DL (ref 8.5–10.1)
CHLORIDE SERPL-SCNC: 104 MMOL/L (ref 100–108)
CO2 SERPL-SCNC: 23 MMOL/L (ref 21–32)
CREAT SERPL-MCNC: 1.52 MG/DL (ref 0.6–1.3)
DIFFERENTIAL METHOD BLD: ABNORMAL
EOSINOPHIL # BLD: 0 K/UL (ref 0–0.4)
EOSINOPHIL NFR BLD: 0 % (ref 0–5)
ERYTHROCYTE [DISTWIDTH] IN BLOOD BY AUTOMATED COUNT: 12.8 % (ref 11.6–14.5)
FLUAV AG NPH QL IA: NEGATIVE
FLUBV AG NOSE QL IA: NEGATIVE
GLOBULIN SER CALC-MCNC: 3.4 G/DL (ref 2–4)
GLUCOSE SERPL-MCNC: 93 MG/DL (ref 74–99)
HCT VFR BLD AUTO: 46.6 % (ref 36–48)
HGB BLD-MCNC: 16.4 G/DL (ref 13–16)
LIPASE SERPL-CCNC: 91 U/L (ref 73–393)
LYMPHOCYTES # BLD: 0.5 K/UL (ref 0.9–3.6)
LYMPHOCYTES NFR BLD: 7 % (ref 21–52)
MAGNESIUM SERPL-MCNC: 2.2 MG/DL (ref 1.6–2.6)
MCH RBC QN AUTO: 33.4 PG (ref 24–34)
MCHC RBC AUTO-ENTMCNC: 35.2 G/DL (ref 31–37)
MCV RBC AUTO: 94.9 FL (ref 74–97)
MONOCYTES # BLD: 0.5 K/UL (ref 0.05–1.2)
MONOCYTES NFR BLD: 7 % (ref 3–10)
NEUTS SEG # BLD: 6.3 K/UL (ref 1.8–8)
NEUTS SEG NFR BLD: 86 % (ref 40–73)
PLATELET # BLD AUTO: 207 K/UL (ref 135–420)
PMV BLD AUTO: 10.5 FL (ref 9.2–11.8)
POTASSIUM SERPL-SCNC: 4.7 MMOL/L (ref 3.5–5.5)
PROT SERPL-MCNC: 7.6 G/DL (ref 6.4–8.2)
RBC # BLD AUTO: 4.91 M/UL (ref 4.7–5.5)
SODIUM SERPL-SCNC: 138 MMOL/L (ref 136–145)
WBC # BLD AUTO: 7.4 K/UL (ref 4.6–13.2)

## 2018-02-17 PROCEDURE — 96374 THER/PROPH/DIAG INJ IV PUSH: CPT

## 2018-02-17 PROCEDURE — 96375 TX/PRO/DX INJ NEW DRUG ADDON: CPT

## 2018-02-17 PROCEDURE — 96361 HYDRATE IV INFUSION ADD-ON: CPT

## 2018-02-17 PROCEDURE — 74011250636 HC RX REV CODE- 250/636: Performed by: EMERGENCY MEDICINE

## 2018-02-17 PROCEDURE — 74022 RADEX COMPL AQT ABD SERIES: CPT

## 2018-02-17 PROCEDURE — 87804 INFLUENZA ASSAY W/OPTIC: CPT | Performed by: EMERGENCY MEDICINE

## 2018-02-17 RX ORDER — ONDANSETRON 4 MG/1
4 TABLET, ORALLY DISINTEGRATING ORAL
Qty: 15 TAB | Refills: 0 | Status: SHIPPED | OUTPATIENT
Start: 2018-02-17 | End: 2018-08-06

## 2018-02-17 RX ADMIN — KETOROLAC TROMETHAMINE 15 MG: 30 INJECTION, SOLUTION INTRAMUSCULAR at 00:04

## 2018-02-17 RX ADMIN — SODIUM CHLORIDE 1000 ML: 900 INJECTION, SOLUTION INTRAVENOUS at 00:04

## 2018-02-17 RX ADMIN — ONDANSETRON 4 MG: 2 INJECTION INTRAMUSCULAR; INTRAVENOUS at 00:04

## 2018-02-17 NOTE — ED NOTES
I have reviewed discharge instructions with the patient and spouse. The patient and spouse verbalized understanding. Discharge medications reviewed with patient and appropriate educational materials and side effects teaching were provided.

## 2018-02-17 NOTE — ED TRIAGE NOTES
\"I got sick at 2pm today, extreme abdominal pain and shortness of breath, I urinated then at 3pm I was sweating profusely. I felt like I had to throw up. At 3:30pm I had 2 episodes of diarrhea. I went home at 4, at 5pm my temp was 100. At 6pm my temp was 101. My body aches, my head hurts, I had a couple spoons of tomato soup. RIght now Im just lightheaded and I feel like laying down. We erred on the side of caution and came in. I even donned a mask for everyones safety. \"  Pt is on pain management

## 2018-02-17 NOTE — DISCHARGE INSTRUCTIONS
Abdominal Pain: Care Instructions  Your Care Instructions    Abdominal pain has many possible causes. Some aren't serious and get better on their own in a few days. Others need more testing and treatment. If your pain continues or gets worse, you need to be rechecked and may need more tests to find out what is wrong. You may need surgery to correct the problem. Don't ignore new symptoms, such as fever, nausea and vomiting, urination problems, pain that gets worse, and dizziness. These may be signs of a more serious problem. Your doctor may have recommended a follow-up visit in the next 8 to 12 hours. If you are not getting better, you may need more tests or treatment. The doctor has checked you carefully, but problems can develop later. If you notice any problems or new symptoms, get medical treatment right away. Follow-up care is a key part of your treatment and safety. Be sure to make and go to all appointments, and call your doctor if you are having problems. It's also a good idea to know your test results and keep a list of the medicines you take. How can you care for yourself at home? · Rest until you feel better. · To prevent dehydration, drink plenty of fluids, enough so that your urine is light yellow or clear like water. Choose water and other caffeine-free clear liquids until you feel better. If you have kidney, heart, or liver disease and have to limit fluids, talk with your doctor before you increase the amount of fluids you drink. · If your stomach is upset, eat mild foods, such as rice, dry toast or crackers, bananas, and applesauce. Try eating several small meals instead of two or three large ones. · Wait until 48 hours after all symptoms have gone away before you have spicy foods, alcohol, and drinks that contain caffeine. · Do not eat foods that are high in fat. · Avoid anti-inflammatory medicines such as aspirin, ibuprofen (Advil, Motrin), and naproxen (Aleve).  These can cause stomach upset. Talk to your doctor if you take daily aspirin for another health problem. When should you call for help? Call 911 anytime you think you may need emergency care. For example, call if:  ? · You passed out (lost consciousness). ? · You pass maroon or very bloody stools. ? · You vomit blood or what looks like coffee grounds. ? · You have new, severe belly pain. ?Call your doctor now or seek immediate medical care if:  ? · Your pain gets worse, especially if it becomes focused in one area of your belly. ? · You have a new or higher fever. ? · Your stools are black and look like tar, or they have streaks of blood. ? · You have unexpected vaginal bleeding. ? · You have symptoms of a urinary tract infection. These may include:  ¨ Pain when you urinate. ¨ Urinating more often than usual.  ¨ Blood in your urine. ? · You are dizzy or lightheaded, or you feel like you may faint. ? Watch closely for changes in your health, and be sure to contact your doctor if:  ? · You are not getting better after 1 day (24 hours). Where can you learn more? Go to http://joshFreshTelicia.info/. Enter R586 in the search box to learn more about \"Abdominal Pain: Care Instructions. \"  Current as of: March 20, 2017  Content Version: 11.4  © 4477-5292 [employer]. Care instructions adapted under license by [employer] (which disclaims liability or warranty for this information). If you have questions about a medical condition or this instruction, always ask your healthcare professional. [place] [address] any warranty or liability for your use of this information. Viral Infections: Care Instructions  Your Care Instructions    You don't feel well, but it's not clear what's causing it. You may have a viral infection.  Viruses cause many illnesses, such as the common cold, influenza, fever, rashes, and the diarrhea, nausea, and vomiting that are often called \"stomach flu. \" You may wonder if antibiotic medicines could make you feel better. But antibiotics only treat infections caused by bacteria. They don't work on viruses. The good news is that viral infections usually aren't serious. Most will go away in a few days without medical treatment. In the meantime, there are a few things you can do to make yourself more comfortable. Follow-up care is a key part of your treatment and safety. Be sure to make and go to all appointments, and call your doctor if you are having problems. It's also a good idea to know your test results and keep a list of the medicines you take. How can you care for yourself at home? · Get plenty of rest if you feel tired. · Take an over-the-counter pain medicine if needed, such as acetaminophen (Tylenol), ibuprofen (Advil, Motrin), or naproxen (Aleve). Read and follow all instructions on the label. · Be careful when taking over-the-counter cold or flu medicines and Tylenol at the same time. Many of these medicines have acetaminophen, which is Tylenol. Read the labels to make sure that you are not taking more than the recommended dose. Too much acetaminophen (Tylenol) can be harmful. · Drink plenty of fluids, enough so that your urine is light yellow or clear like water. If you have kidney, heart, or liver disease and have to limit fluids, talk with your doctor before you increase the amount of fluids you drink. · Stay home from work, school, and other public places while you have a fever. When should you call for help? Call 911 anytime you think you may need emergency care. For example, call if:  ? · You have severe trouble breathing. ? · You passed out (lost consciousness). ?Call your doctor now or seek immediate medical care if:  ? · You seem to be getting much sicker. ? · You have a new or higher fever. ? · You have blood in your stools. ? · You have new belly pain, or your pain gets worse. ? · You have a new rash. ?Watch closely for changes in your health, and be sure to contact your doctor if:  ? · You start to get better and then get worse. ? · You do not get better as expected. Where can you learn more? Go to http://josh-elicia.info/. Enter Z802 in the search box to learn more about \"Viral Infections: Care Instructions. \"  Current as of: March 3, 2017  Content Version: 11.4  © 3255-7781 [email or web address]. Care instructions adapted under license by [employer] (which disclaims liability or warranty for this information). If you have questions about a medical condition or this instruction, always ask your healthcare professional. [address] any warranty or liability for your use of this information.

## 2018-02-17 NOTE — ED PROVIDER NOTES
EMERGENCY DEPARTMENT HISTORY AND PHYSICAL EXAM    11:41 PM      Date: 2/16/2018  Patient Name: Candelario Henriquez    History of Presenting Illness     Chief Complaint   Patient presents with    Abdominal Pain    Diarrhea    Sweats    Fever         History Provided By: Patient    Chief Complaint: Abdominal pain, nausea, diarrhea  Duration:  Days (x1)  Timing:  Intermittent  Location: Diffuse lower abdomin, right greater than left  Quality: Cramping  Severity: N/A  Modifying Factors: Relief to SOB and wheeze with inhaler, mild relief to nausea with Tums, relief to pain with Tylenol-3 and Neurontin  Associated Symptoms: Exhbits intermittent SOB, diaphoresis, fever, HA, myalgias, wheezing; Denies emesis, rhinorrhea, cough, sore throat, testicular pain and swelling      Additional History (Context): Candelario Henriquez is a 47 y.o. male with asthma and gout who presents to the ED c/o intermittent, lower abdominal pain, nausea, and diarrhea onset this afternoon. Pt states he was at work when the abdominal pain began diffusely across the lower abdomin; pt describes the pain as cramping and noted it is slightly worse on the right side. Pain additionally radiates to his right, lower back. Abdominal pain was accompanied by SOB, wheezing, and diaphoresis initially, so the pt took a dose of his inhaler and some Tums that provided mild, temporary relief. Pt had two episodes of diarrhea this afternoon, both light brown, loose, and without blood; episodes of diarrhea seem to provide relief to abdominal pain. Pt's wife noted temperatures of 99F, 100F, and 100.9F at home PTA. Pt admits two of his coworkers have been diagnosed with the flu this week. Pt took his evening medications in the ED waiting room prior to being seen (including Ambien, Tylenol-3 and Neurontin) which have provided relief to his pain. Pt did not, however, take his nightly dose of Symbicort and therefore has noticed increase in his wheezing.  Pt noted his abdominal pain has subsided. Pt additionally noted he has a HA and full body aches however he denies emesis, rhinorrhea, cough, sore throat, testicular pain and swelling. Pt noted he has a kidney infection last month that he went to a [place] ED for. At that visit, providers found a \"spot\" on his testicle. Pt followed up with a urologist for this issue two days ago and was told the spot was benign. PCP: Lorena Bustillos MD    Current Outpatient Prescriptions   Medication Sig Dispense Refill    ondansetron (ZOFRAN ODT) 4 mg disintegrating tablet Take 1 Tab by mouth every eight (8) hours as needed for Nausea. 15 Tab 0    fenofibrate nanocrystallized (TRICOR) 145 mg tablet Take 1 Tab by mouth daily. 90 Tab 1    zolpidem (AMBIEN) 5 mg tablet TAKE ONE TABLET BY MOUTH NIGHTLY AS NEEDED FOR SLEEP. MAXIMUM DAILY AMOUNT: 5 MG 30 Tab 0    acetaminophen-codeine (TYLENOL #3) 300-30 mg per tablet TAKE ONE TABLET BY MOUTH THREE TIMES DAILY AS NEEDED FOR PAIN (MAX DAILY AMOUNT: 3 TABS) 90 Tab 0    gabapentin (NEURONTIN) 300 mg capsule Take 1 Cap by mouth two (2) times a day. 180 Cap 0    budesonide-formoterol (SYMBICORT) 80-4.5 mcg/actuation HFAA Take 2 Puffs by inhalation two (2) times a day. 1 Inhaler 5    allopurinol (ZYLOPRIM) 100 mg tablet Take 1 Tab by mouth daily. Indications: prevention of acute gout attack 90 Tab 1    albuterol (PROVENTIL HFA, VENTOLIN HFA, PROAIR HFA) 90 mcg/actuation inhaler Take  by inhalation.  tamsulosin (FLOMAX) 0.4 mg capsule Take 1 Cap by mouth daily (after dinner).  80 Cap 3       Past History     Past Medical History:  Past Medical History:   Diagnosis Date    Asthma     Benign prostatic hyperplasia with lower urinary tract symptoms     Benign prostatic hyperplasia with urinary obstruction     Cervicalgia     Chronic pain     Elevated PSA     Flank pain     Gout     Head ache     Hearing loss     Hypertrophy of prostate with urinary obstruction and other lower urinary tract symptoms (LUTS)     Kidney stones     Lumbago     Neuralgia     Testicular abnormality     Urge incontinence     Urinary frequency        Past Surgical History:  Past Surgical History:   Procedure Laterality Date    HX HERNIA REPAIR      HX TONSILLECTOMY      HX UROLOGICAL  8/27/15    PNBx-TRUS Vol 47 cc's, Benign, Dr. Angela Ridley EXTRACTION         Family History:  Family History   Problem Relation Age of Onset    Cancer Mother     No Known Problems Father     Asthma Sister     Stroke Maternal Grandmother        Social History:  Social History   Substance Use Topics    Smoking status: Former Smoker    Smokeless tobacco: Never Used    Alcohol use 12.6 oz/week     14 Glasses of wine, 7 Shots of liquor per week       Allergies:  No Known Allergies      Review of Systems       Review of Systems   Constitutional: Positive for diaphoresis and fever. HENT: Negative for rhinorrhea and sore throat. Respiratory: Positive for wheezing. Negative for cough and shortness of breath. Gastrointestinal: Positive for abdominal pain (Lower), diarrhea and nausea. Negative for blood in stool and vomiting. Genitourinary: Negative for scrotal swelling and testicular pain. Musculoskeletal: Positive for back pain (Lower, right) and myalgias. Neurological: Positive for headaches. Physical Exam     Visit Vitals    /71    Pulse 81    Temp 98.9 °F (37.2 °C)    Resp 15    Wt 123.4 kg (272 lb)    SpO2 97%    BMI 36.89 kg/m2         Physical Exam   Constitutional:   General:  Well-developed, well-nourished, mildly warm to touch nontoxic nondiaphoretic. Head:  Normocephalic atraumatic. Eyes:  Pupils midrange extraocular movements intact. No pallor or conjunctival injection. Nose:  No rhinorrhea, inspection grossly normal.    Ears:  Grossly normal to inspection, no discharge. Mouth:  Mucous membranes moist, no appreciable intraoral lesion.     Neck/Back:  Trachea midline, no asymmetry. Chest:  Grossly normal inspection, symmetric chest rise. Pulmonary:  Clear to auscultation bilaterally no wheezes rhonchi or rales. Cardiovascular:  S1-S2 no murmurs rubs or gallops. Abdomen: Soft, nontender, nondistended no guarding rebound or peritoneal signs. Completely benign abdominal examination there Is no RIGHT lower quadrant tenderness no LEFT lower quadrant tenderness no CVA tenderness  : Refused  Extremities:  Grossly normal to inspection, peripheral pulses intact  in all 4 extremities  Neurologic:  Alert and oriented no appreciable focal neurologic deficit. Skin:  Warm and dry  Psychiatric:  Grossly normal mood and affect. Nursing note reviewed, vital signs reviewed. Diagnostic Study Results     Labs -  Recent Results (from the past 12 hour(s))   CBC WITH AUTOMATED DIFF    Collection Time: 02/16/18 11:19 PM   Result Value Ref Range    WBC 7.4 4.6 - 13.2 K/uL    RBC 4.91 4.70 - 5.50 M/uL    HGB 16.4 (H) 13.0 - 16.0 g/dL    HCT 46.6 36.0 - 48.0 %    MCV 94.9 74.0 - 97.0 FL    MCH 33.4 24.0 - 34.0 PG    MCHC 35.2 31.0 - 37.0 g/dL    RDW 12.8 11.6 - 14.5 %    PLATELET 498 313 - 141 K/uL    MPV 10.5 9.2 - 11.8 FL    NEUTROPHILS 86 (H) 40 - 73 %    LYMPHOCYTES 7 (L) 21 - 52 %    MONOCYTES 7 3 - 10 %    EOSINOPHILS 0 0 - 5 %    BASOPHILS 0 0 - 2 %    ABS. NEUTROPHILS 6.3 1.8 - 8.0 K/UL    ABS. LYMPHOCYTES 0.5 (L) 0.9 - 3.6 K/UL    ABS. MONOCYTES 0.5 0.05 - 1.2 K/UL    ABS. EOSINOPHILS 0.0 0.0 - 0.4 K/UL    ABS.  BASOPHILS 0.0 0.0 - 0.06 K/UL    DF AUTOMATED     METABOLIC PANEL, COMPREHENSIVE    Collection Time: 02/16/18 11:19 PM   Result Value Ref Range    Sodium 138 136 - 145 mmol/L    Potassium 4.7 3.5 - 5.5 mmol/L    Chloride 104 100 - 108 mmol/L    CO2 23 21 - 32 mmol/L    Anion gap 11 3.0 - 18 mmol/L    Glucose 93 74 - 99 mg/dL    BUN 21 (H) 7.0 - 18 MG/DL    Creatinine 1.52 (H) 0.6 - 1.3 MG/DL    BUN/Creatinine ratio 14 12 - 20      GFR est AA 58 (L) >60 ml/min/1.73m2    GFR est non-AA 48 (L) >60 ml/min/1.73m2    Calcium 9.2 8.5 - 10.1 MG/DL    Bilirubin, total 0.8 0.2 - 1.0 MG/DL    ALT (SGPT) 45 16 - 61 U/L    AST (SGOT) 22 15 - 37 U/L    Alk. phosphatase 74 45 - 117 U/L    Protein, total 7.6 6.4 - 8.2 g/dL    Albumin 4.2 3.4 - 5.0 g/dL    Globulin 3.4 2.0 - 4.0 g/dL    A-G Ratio 1.2 0.8 - 1.7     LIPASE    Collection Time: 02/16/18 11:19 PM   Result Value Ref Range    Lipase 91 73 - 393 U/L   MAGNESIUM    Collection Time: 02/16/18 11:19 PM   Result Value Ref Range    Magnesium 2.2 1.6 - 2.6 mg/dL   INFLUENZA A & B AG (RAPID TEST)    Collection Time: 02/17/18 12:00 AM   Result Value Ref Range    Influenza A Antigen NEGATIVE  NEG      Influenza B Antigen NEGATIVE  NEG         Radiologic Studies -   XR ABD ACUTE W 1 V CHEST    (Results Pending)         Medical Decision Making   I am the first provider for this patient. I reviewed the vital signs, available nursing notes, past medical history, past surgical history, family history and social history. Vital Signs-Reviewed the patient's vital signs. Pulse Oximetry Analysis -  96% on room air (non-hypoxic)    Cardiac Monitor:  Rate: 112  Rhythm:  Sinus Tachycardia     Records Reviewed: Old Medical Records (Time of Review: 11:41 PM)    ED Course: Progress Notes, Reevaluation, and Consults:  Provider Notes (Medical Decision Making):     ED course:  Patient with flu exposure, runny nose myalgias abdominal pain only when having to move his bowels persisted with diarrhea. His abdomen is completely benign here. He is borderline febrile orally here, not tachycardic saturation is normal on room air. Most likely influenza we'll check labs treat symptoms and monitor here. Discussed his presentation and he agrees that he does not need a CAT scan for his abdominal pain since his pain only felt like he had to move his bowels and after having diarrhea it is better.     X-ray of the abdomen per my interpretation mildly dilated small bowel loop in the LEFT lower    The x-ray finding is nonspecific, I went back to evaluate the patient, he reports that symptoms are much better his abdomen examination remains completely benign, no guarding rebound or peritoneal signs no LEFT lower quadrant tenderness. Discussed a CT scan, he would rather go home treat his symptoms return here with any concerns, he has no barriers to return, discussed red flags and he was in agreement with discharge    The patient's history, physical and laboratory evaluations were reviewed. At this time I can find no definitive cause for the patient's presentation and current information is reassuring. I feel the patient is stable for outpatient evaluation and cannot reasonably expect the patient to decompensate before follow-up with primary care physician/specialist.  Patient was informed about the diagnostic uncertainty of ED evaluation, red flags were discussed and patient was instructed to return with any concerns. Disposition:    Discharged home      Portions of this chart were created with Dragon medical speech to text program.   Unrecognized errors may be present. Diagnosis     Clinical Impression:   1. Viral syndrome    2. LUMA (acute kidney injury) ([facility] [address]    3. Abdominal pain, generalized        Disposition: Discharge    Follow-up Information     Follow up With Details Comments Contact Info    Galina Orozco MD Call in 2 days  [address]  [address] [facility] EMERGENCY DEPT  As needed, If symptoms worsen [address]  [phone]           Discharge Medication List as of 2/17/2018  1:13 AM      START taking these medications    Details   ondansetron (ZOFRAN ODT) 4 mg disintegrating tablet Take 1 Tab by mouth every eight (8) hours as needed for Nausea. , Print, Disp-15 Tab, R-0         CONTINUE these medications which have NOT CHANGED    Details   fenofibrate nanocrystallized (TRICOR) 145 mg tablet Take 1 Tab by mouth daily. , Normal, Disp-90 Tab, R-1      zolpidem (AMBIEN) 5 mg tablet TAKE ONE TABLET BY MOUTH NIGHTLY AS NEEDED FOR SLEEP. MAXIMUM DAILY AMOUNT: 5 MG, Print, Disp-30 Tab, R-0      acetaminophen-codeine (TYLENOL #3) 300-30 mg per tablet TAKE ONE TABLET BY MOUTH THREE TIMES DAILY AS NEEDED FOR PAIN (MAX DAILY AMOUNT: 3 TABS), Print, Disp-90 Tab, R-0      gabapentin (NEURONTIN) 300 mg capsule Take 1 Cap by mouth two (2) times a day., Normal, Disp-180 Cap, R-0      budesonide-formoterol (SYMBICORT) 80-4.5 mcg/actuation HFAA Take 2 Puffs by inhalation two (2) times a day., Normal, Disp-1 Inhaler, R-5      allopurinol (ZYLOPRIM) 100 mg tablet Take 1 Tab by mouth daily. Indications: prevention of acute gout attack, Normal, Disp-90 Tab, R-1      albuterol (PROVENTIL HFA, VENTOLIN HFA, PROAIR HFA) 90 mcg/actuation inhaler Take  by inhalation. , Historical Med      tamsulosin (FLOMAX) 0.4 mg capsule Take 1 Cap by mouth daily (after dinner). , Normal, Disp-90 Cap, R-3           _______________________________    Attestations:  Scribe Attestation     Briana Selby acting as a scribe for and in the presence of Mariela Weston MD      February 16, 2018 at 11:41 PM       Provider Attestation:      I personally performed the services described in the documentation, reviewed the documentation, as recorded by the scribe in my presence, and it accurately and completely records my words and actions.  February 16, 2018 at 11:41 PM - Mariela Weston MD    _______________________________

## 2018-02-18 RX ORDER — TAMSULOSIN HYDROCHLORIDE 0.4 MG/1
0.4 CAPSULE ORAL
Qty: 90 CAP | Refills: 3 | Status: SHIPPED | COMMUNITY
Start: 2018-02-18 | End: 2019-03-19 | Stop reason: SDUPTHER

## 2018-02-19 ENCOUNTER — TELEPHONE (OUTPATIENT)
Dept: FAMILY MEDICINE CLINIC | Age: 53
End: 2018-02-19

## 2018-02-19 ENCOUNTER — PATIENT OUTREACH (OUTPATIENT)
Dept: INTERNAL MEDICINE CLINIC | Age: 53
End: 2018-02-19

## 2018-02-19 RX ORDER — CEPHALEXIN 500 MG/1
CAPSULE ORAL
Refills: 0 | COMMUNITY
Start: 2018-01-07 | End: 2018-02-19 | Stop reason: ALTCHOICE

## 2018-02-19 NOTE — PROGRESS NOTES
Transitional Care: ED Visit        Patient listed on discharge report on February 19th. .  Patient was in the ED at [facility] on 2/17/18 with fever and abdominal pain. Past Medical History:   Diagnosis Date    Asthma     Benign prostatic hyperplasia with lower urinary tract symptoms     Benign prostatic hyperplasia with urinary obstruction     Cervicalgia     Chronic pain     Elevated PSA     Flank pain     Gout     Head ache     Hearing loss     Hypertrophy of prostate with urinary obstruction and other lower urinary tract symptoms (LUTS)     Kidney stones     Lumbago     Neuralgia     Testicular abnormality     Urge incontinence     Urinary frequency        Called patient on Monday, February 19th, 2018. Verifying with 2 identifiers. Medication reconciliation completed:  No barriers noted with patient obtaining meds. Patient states he is feeling much better today. States his fever finally broke yesterday and his abdominal pain is almost completely gone. Reviewed red flags with patient :  Fever, chills, worsening pain, vomiting , nausea, blood in stools, vomiting blood. Encouraged patient to contact me if has questions and /or concerns . Since pt seen recently by Dr. Abdullahi Menendez will verify when he wants to see patient again.     Karan Torres RN

## 2018-03-05 DIAGNOSIS — G89.29 ENCOUNTER FOR CHRONIC PAIN MANAGEMENT: ICD-10-CM

## 2018-03-05 DIAGNOSIS — M50.30 DEGENERATIVE DISC DISEASE, CERVICAL: ICD-10-CM

## 2018-03-05 DIAGNOSIS — G47.00 INSOMNIA, UNSPECIFIED TYPE: ICD-10-CM

## 2018-03-05 DIAGNOSIS — G89.29 CHRONIC MIDLINE LOW BACK PAIN WITHOUT SCIATICA: ICD-10-CM

## 2018-03-05 DIAGNOSIS — M54.50 CHRONIC MIDLINE LOW BACK PAIN WITHOUT SCIATICA: ICD-10-CM

## 2018-03-06 RX ORDER — ZOLPIDEM TARTRATE 5 MG/1
TABLET ORAL
Qty: 30 TAB | Refills: 0 | Status: SHIPPED | OUTPATIENT
Start: 2018-03-06 | End: 2018-04-06 | Stop reason: SDUPTHER

## 2018-03-06 RX ORDER — ACETAMINOPHEN AND CODEINE PHOSPHATE 300; 30 MG/1; MG/1
TABLET ORAL
Qty: 90 TAB | Refills: 0 | Status: SHIPPED | OUTPATIENT
Start: 2018-03-06 | End: 2018-04-06 | Stop reason: SDUPTHER

## 2018-04-03 DIAGNOSIS — G89.29 ENCOUNTER FOR CHRONIC PAIN MANAGEMENT: ICD-10-CM

## 2018-04-03 DIAGNOSIS — G47.00 INSOMNIA, UNSPECIFIED TYPE: ICD-10-CM

## 2018-04-03 DIAGNOSIS — M54.50 CHRONIC MIDLINE LOW BACK PAIN WITHOUT SCIATICA: ICD-10-CM

## 2018-04-03 DIAGNOSIS — G89.29 CHRONIC MIDLINE LOW BACK PAIN WITHOUT SCIATICA: ICD-10-CM

## 2018-04-03 DIAGNOSIS — M50.30 DEGENERATIVE DISC DISEASE, CERVICAL: ICD-10-CM

## 2018-04-03 NOTE — TELEPHONE ENCOUNTER
Requested Prescriptions     Pending Prescriptions Disp Refills    zolpidem (AMBIEN) 5 mg tablet 30 Tab 0     Sig: TAKE ONE TABLET BY MOUTH NIGHTLY AS NEEDED FOR SLEEP. MAXIMUM DAILY AMOUNT: 5 MG    budesonide-formoterol (SYMBICORT) 80-4.5 mcg/actuation HFAA 1 Inhaler 5     Sig: Take 2 Puffs by inhalation two (2) times a day.     acetaminophen-codeine (TYLENOL #3) 300-30 mg per tablet 90 Tab 0     Sig: TAKE ONE TABLET BY MOUTH THREE TIMES DAILY AS NEEDED FOR PAIN (MAX DAILY AMOUNT: 3 TABS)

## 2018-04-04 RX ORDER — ZOLPIDEM TARTRATE 5 MG/1
TABLET ORAL
Qty: 30 TAB | Refills: 0 | OUTPATIENT
Start: 2018-04-04

## 2018-04-04 RX ORDER — ACETAMINOPHEN AND CODEINE PHOSPHATE 300; 30 MG/1; MG/1
TABLET ORAL
Qty: 90 TAB | Refills: 0 | OUTPATIENT
Start: 2018-04-04

## 2018-04-04 RX ORDER — BUDESONIDE AND FORMOTEROL FUMARATE DIHYDRATE 80; 4.5 UG/1; UG/1
2 AEROSOL RESPIRATORY (INHALATION) 2 TIMES DAILY
Qty: 1 INHALER | Refills: 5 | Status: SHIPPED | OUTPATIENT
Start: 2018-04-04 | End: 2018-08-06 | Stop reason: SDUPTHER

## 2018-04-06 ENCOUNTER — OFFICE VISIT (OUTPATIENT)
Dept: FAMILY MEDICINE CLINIC | Age: 53
End: 2018-04-06

## 2018-04-06 VITALS
DIASTOLIC BLOOD PRESSURE: 103 MMHG | SYSTOLIC BLOOD PRESSURE: 138 MMHG | TEMPERATURE: 97.3 F | RESPIRATION RATE: 18 BRPM | HEART RATE: 85 BPM | OXYGEN SATURATION: 99 % | HEIGHT: 72 IN | WEIGHT: 266 LBS | BODY MASS INDEX: 36.03 KG/M2

## 2018-04-06 DIAGNOSIS — G89.29 CHRONIC MIDLINE LOW BACK PAIN WITHOUT SCIATICA: Primary | ICD-10-CM

## 2018-04-06 DIAGNOSIS — G89.29 ENCOUNTER FOR CHRONIC PAIN MANAGEMENT: ICD-10-CM

## 2018-04-06 DIAGNOSIS — M50.30 DEGENERATIVE DISC DISEASE, CERVICAL: ICD-10-CM

## 2018-04-06 DIAGNOSIS — M54.50 CHRONIC MIDLINE LOW BACK PAIN WITHOUT SCIATICA: Primary | ICD-10-CM

## 2018-04-06 DIAGNOSIS — G47.00 INSOMNIA, UNSPECIFIED TYPE: ICD-10-CM

## 2018-04-06 RX ORDER — ZOLPIDEM TARTRATE 5 MG/1
TABLET ORAL
Qty: 30 TAB | Refills: 0 | Status: SHIPPED | OUTPATIENT
Start: 2018-04-06 | End: 2018-05-08 | Stop reason: SDUPTHER

## 2018-04-06 RX ORDER — ACETAMINOPHEN AND CODEINE PHOSPHATE 300; 30 MG/1; MG/1
TABLET ORAL
Qty: 90 TAB | Refills: 0 | Status: SHIPPED | OUTPATIENT
Start: 2018-04-06 | End: 2018-05-08 | Stop reason: SDUPTHER

## 2018-04-06 NOTE — PROGRESS NOTES
HISTORY OF PRESENT ILLNESS  Harman Marina is a 47 y.o. male. HPI Comments: Pt is here for a refill of his pain medications. He had an evaluation by a physiatrist in [place] on 3/18 (part of the Work[employer]'s Comp Board there because he was injured in 2002 while working as a ). The evaluation gives a rundown of what happened and at the end basically says he shouldn't be on anything for pain except prn NSAIDs. He continues to have severe lower back pain and neck pain. I added the gabapentin last year for his neck and it has helped. He also has insomnia. Years ago he was prescribed something else for sleep, it didn't work. He ended up settling on Ambien, but told is doctor he wanted the lowest dose possible. Ambien 5 mg helps. Medication Evaluation   Pertinent negatives include no chest pain, no abdominal pain, no headaches and no shortness of breath. Review of Systems   Constitutional: Negative for chills and fever. Eyes: Negative for blurred vision and double vision. Respiratory: Negative for cough and shortness of breath. Cardiovascular: Negative for chest pain and palpitations. Gastrointestinal: Negative for abdominal pain, nausea and vomiting. Musculoskeletal: Positive for back pain, myalgias and neck pain. Negative for falls and joint pain. Neurological: Negative for headaches. Psychiatric/Behavioral: The patient has insomnia. Physical Exam   Constitutional: He is oriented to person, place, and time. He appears well-developed and well-nourished. Eyes: Pupils are equal, round, and reactive to light. Neck: Neck supple. No thyromegaly present. Cardiovascular: Normal rate and regular rhythm. Pulmonary/Chest: Effort normal and breath sounds normal. No respiratory distress. Abdominal: Soft. There is no tenderness. Musculoskeletal:    in neck and back, decreased ROM lumbar spine. Lymphadenopathy:     He has no cervical adenopathy.    Neurological: He is alert and oriented to person, place, and time. No cranial nerve deficit. Nursing note and vitals reviewed. ASSESSMENT and PLAN    ICD-10-CM ICD-9-CM    1. Chronic midline low back pain without sciatica M54.5 724.2 acetaminophen-codeine (TYLENOL #3) 300-30 mg per tablet    G89.29 338.29 REFERRAL TO ORTHOPEDICS      naloxone (NARCAN) 2 mg/actuation spry   2. Degenerative disc disease, cervical M50.30 722.4 acetaminophen-codeine (TYLENOL #3) 300-30 mg per tablet      REFERRAL TO ORTHOPEDICS   3. Encounter for chronic pain management G89.29 V65.49 acetaminophen-codeine (TYLENOL #3) 300-30 mg per tablet      naloxone (NARCAN) 2 mg/actuation spry   4. Insomnia, unspecified type G47.00 780.52 zolpidem (AMBIEN) 5 mg tablet       Will refill the meds, refer to specialist here to see if he suggests anything different than pain management.     AVS instructions reviewed with patient, pt verbalized understanding

## 2018-04-06 NOTE — PROGRESS NOTES
Rm 3  Pt presents to the clinic for a med evaluation. Depression Screening:  PHQ over the last two weeks 4/6/2018 11/6/2017 7/24/2017 7/18/2017   Little interest or pleasure in doing things Not at all Not at all Not at all Not at all   Feeling down, depressed or hopeless Not at all Not at all Not at all Not at all   Total Score PHQ 2 0 0 0 0       Learning Assessment:  Learning Assessment 7/18/2017   PRIMARY LEARNER Patient   HIGHEST LEVEL OF EDUCATION - PRIMARY LEARNER  > 4 YEARS OF COLLEGE   BARRIERS PRIMARY LEARNER NONE   CO-LEARNER CAREGIVER No   PRIMARY LANGUAGE ENGLISH   LEARNER PREFERENCE PRIMARY DEMONSTRATION   ANSWERED BY patient   RELATIONSHIP SELF       Abuse Screening:  No flowsheet data found. Health Maintenance reviewed and discussed per provider: yes     Coordination of Care:    1. Have you been to the ER, urgent care clinic since your last visit? Hospitalized since your last visit? no    2. Have you seen or consulted any other health care providers outside of the [address] [place] [address] since your last visit? Include any pap smears or colon screening.  no

## 2018-04-06 NOTE — TELEPHONE ENCOUNTER
Pt requesting as to why the two prescriptions of Ambien and Tylenol were refused. Asked pt if he would like to schedule an reji. Pt stated he is running out of meds and doesn't know when he would be able to come in. Pt stated he's been getting these two meds approved under workers comp for years.  Pt stated he is confused

## 2018-04-06 NOTE — MR AVS SNAPSHOT
[address] [facility] 
 
 
 [address] [facility] 83 [address] 
[phone] Patient: Joce Hernández. MRN: [id number] :1965 Visit Information Date & Time Provider Department Dept. Phone Encounter #  
 2018  3:15 PM Jona Bird, 233 [facility] [phone] [id number] Follow-up Instructions Return if symptoms worsen or fail to improve. Your Appointments 2018  7:15 AM  
Office Visit with Jona Bird MD  
[address]) Appt Note: 3 mo f/u  
 [address] [facility] [address]  
[phone]  
  
   
 [address]  
  
    
 2018 10:30 AM  
ESTABLISHED PATIENT with Sabrina Zuñiga MD  
[facility] of [address]) Appt Note: Return in about 6 months (around 2018) for PSA F/T prior and KATIA  
 [address] 2 [facility] 68 [address]  
  
    
 10/18/2018 10:15 AM  
ESTABLISHED PATIENT with Sabrina Zuñiga MD  
[facility] [address]) Appt Note: Return in about 1 year (around 10/19/2018) for Flow/PVR, PSA prior, BPH, Elevated PSA  
 [address] [address] [phone] Upcoming Health Maintenance Date Due DTaP/Tdap/Td series (1 - Tdap) 1986 MEDICARE YEARLY EXAM 2019 COLONOSCOPY 2022 Allergies as of 2018  Review Complete On: 2018 By: Jona Bird MD  
 No Known Allergies Current Immunizations  Never Reviewed No immunizations on file. Not reviewed this visit You Were Diagnosed With   
  
 Codes Comments Chronic midline low back pain without sciatica    -  Primary ICD-10-CM: M54.5, G89.29 ICD-9-CM: 724.2, 338.29 Degenerative disc disease, cervical     ICD-10-CM: M50.30 ICD-9-CM: 722.4 Encounter for chronic pain management     ICD-10-CM: G89.29 ICD-9-CM: V65.49 Insomnia, unspecified type     ICD-10-CM: G47.00 ICD-9-CM: 780.52 Vitals BP Pulse Temp Resp Height(growth percentile) Weight(growth percentile) (!) 138/103 (BP 1 Location: Left arm, BP Patient Position: Sitting) 85 97.3 °F (36.3 °C) (Oral) 18 6' (1.829 m) 266 lb (120.7 kg) SpO2 BMI Smoking Status 99% 36.08 kg/m2 Former Smoker Vitals History BMI and BSA Data Body Mass Index Body Surface Area 36.08 kg/m 2 2.48 m 2 Preferred Pharmacy Pharmacy Name Phone [place], [address] 11 [facility] [phone] Your Updated Medication List  
  
   
This list is accurate as of 4/6/18  4:17 PM.  Always use your most recent med list.  
  
  
  
  
 acetaminophen-codeine 300-30 mg per tablet Commonly known as:  TYLENOL #3  
TAKE ONE TABLET BY MOUTH THREE TIMES DAILY AS NEEDED FOR PAIN (MAX DAILY AMOUNT: 3 TABS)  
  
 albuterol 90 mcg/actuation inhaler Commonly known as:  PROVENTIL HFA, VENTOLIN HFA, PROAIR HFA Take  by inhalation. allopurinol 100 mg tablet Commonly known as:  Matthew Sprawls Take 1 Tab by mouth daily. Indications: prevention of acute gout attack  
  
 budesonide-formoterol 80-4.5 mcg/actuation Hfaa Commonly known as:  SYMBICORT Take 2 Puffs by inhalation two (2) times a day. fenofibrate nanocrystallized 145 mg tablet Commonly known as:  [employer] Take 1 Tab by mouth daily. gabapentin 300 mg capsule Commonly known as:  NEURONTIN Take 1 Cap by mouth two (2) times a day.  
  
 naloxone 2 mg/actuation Spry Commonly known as:  [employer] Use 1 spray intranasally into 1 nostril. Use a new Narcan nasal spray for subsequent doses and administer into alternating nostrils. May repeat every 2 to 3 minutes as needed. ondansetron 4 mg disintegrating tablet Commonly known as:  ZOFRAN ODT Take 1 Tab by mouth every eight (8) hours as needed for Nausea. tamsulosin 0.4 mg capsule Commonly known as:  FLOMAX Take 1 Cap by mouth daily (after dinner). zolpidem 5 mg tablet Commonly known as:  AMBIEN  
TAKE ONE TABLET BY MOUTH NIGHTLY AS NEEDED FOR SLEEP. MAXIMUM DAILY AMOUNT: 5 MG  
  
  
  
  
Prescriptions Printed Refills  
 acetaminophen-codeine (TYLENOL #3) 300-30 mg per tablet 0 Sig: TAKE ONE TABLET BY MOUTH THREE TIMES DAILY AS NEEDED FOR PAIN (MAX DAILY AMOUNT: 3 TABS) Class: Print  
 zolpidem (AMBIEN) 5 mg tablet 0 Sig: TAKE ONE TABLET BY MOUTH NIGHTLY AS NEEDED FOR SLEEP. MAXIMUM DAILY AMOUNT: 5 MG Class: Print  
 naloxone (NARCAN) 2 mg/actuation spry 0 Sig: Use 1 spray intranasally into 1 nostril. Use a new Narcan nasal spray for subsequent doses and administer into alternating nostrils. May repeat every 2 to 3 minutes as needed. Class: Print We Performed the Following REFERRAL TO ORTHOPEDICS [CQA150 Custom] Follow-up Instructions Return if symptoms worsen or fail to improve. Referral Information Referral ID Referred By Referred To  
  
 [id number] MD Magan   
   [address] [place] [address], [address] Phone: [phone] Fax: [phone] Visits Status Start Date End Date 1 New Request 4/6/18 4/6/19 If your referral has a status of pending review or denied, additional information will be sent to support the outcome of this decision. Patient Instructions I have refilled your medications. Because of the 3rd party review from [place] that I received (basically saying that you shouldn't be on any medications) I am referring you to our specialist here for an opinion. If he feels that there is no intervention that would help and pain management is needed, I can continue. I have prescribed a medication that you should fill. It's used to treat narcotic overdose. Any significant others should know you have this and know how to use it. It is a one time nasal spraiy. Introducing [facility] & HEALTH SERVICES! [facility] introduces [employer] patient portal. Now you can access parts of your medical record, email your doctor's office, and request medication refills online. 1. In your internet browser, go to [email or web address] 2. Click on the First Time User? Click Here link in the Sign In box. You will see the New Member Sign Up page. 3. Enter your [employer] Access Code exactly as it appears below. You will not need to use this code after youve completed the sign-up process. If you do not sign up before the expiration date, you must request a new code. · [employer] Access Code: [email or web address] Expires: 4/17/2018 10:54 AM 
 
4. Enter the last four digits of your Social Security Number (xxxx) and Date of Birth (mm/dd/yyyy) as indicated and click Submit. You will be taken to the next sign-up page. 5. Create a [employer] ID. This will be your [employer] login ID and cannot be changed, so think of one that is secure and easy to remember. 6. Create a [employer] password. You can change your password at any time. 7. Enter your Password Reset Question and Answer. This can be used at a later time if you forget your password. 8. Enter your e-mail address. You will receive e-mail notification when new information is available in [address] 9. Click Sign Up. You can now view and download portions of your medical record. 10. Click the Download Summary menu link to download a portable copy of your medical information. If you have questions, please visit the Frequently Asked Questions section of the [employer] website. Remember, [employer] is NOT to be used for urgent needs. For medical emergencies, dial 911. Now available from your iPhone and Android! Please provide this summary of care documentation to your next provider. Your primary care clinician is listed as Ankush Moser. If you have any questions after today's visit, please call [phone].

## 2018-04-06 NOTE — PATIENT INSTRUCTIONS
I have refilled your medications. Because of the 3rd party review from [place] that I received (basically saying that you shouldn't be on any medications) I am referring you to our specialist here for an opinion. If he feels that there is no intervention that would help and pain management is needed, I can continue. I have prescribed a medication that you should fill. It's used to treat narcotic overdose. Any significant others should know you have this and know how to use it. It is a one time nasal spraiy.

## 2018-05-07 ENCOUNTER — OFFICE VISIT (OUTPATIENT)
Dept: ORTHOPEDIC SURGERY | Age: 53
End: 2018-05-07

## 2018-05-07 VITALS
BODY MASS INDEX: 36.03 KG/M2 | TEMPERATURE: 98.3 F | HEIGHT: 72 IN | OXYGEN SATURATION: 98 % | SYSTOLIC BLOOD PRESSURE: 145 MMHG | RESPIRATION RATE: 18 BRPM | DIASTOLIC BLOOD PRESSURE: 94 MMHG | HEART RATE: 68 BPM | WEIGHT: 266 LBS

## 2018-05-07 DIAGNOSIS — G89.4 CHRONIC PAIN SYNDROME: Primary | ICD-10-CM

## 2018-05-07 DIAGNOSIS — M51.36 ANNULAR TEAR OF LUMBAR DISC: ICD-10-CM

## 2018-05-07 DIAGNOSIS — G89.29 CHRONIC LOW BACK PAIN WITHOUT SCIATICA, UNSPECIFIED BACK PAIN LATERALITY: ICD-10-CM

## 2018-05-07 DIAGNOSIS — M54.50 CHRONIC LOW BACK PAIN WITHOUT SCIATICA, UNSPECIFIED BACK PAIN LATERALITY: ICD-10-CM

## 2018-05-07 PROBLEM — E66.01 SEVERE OBESITY (BMI 35.0-39.9) WITH COMORBIDITY (HCC): Status: ACTIVE | Noted: 2018-05-07

## 2018-05-07 RX ORDER — GABAPENTIN 600 MG/1
600 TABLET ORAL
Qty: 30 TAB | Refills: 5 | Status: SHIPPED | OUTPATIENT
Start: 2018-05-07 | End: 2018-11-05 | Stop reason: SDUPTHER

## 2018-05-07 NOTE — MR AVS SNAPSHOT
[address] [place] [address] [address] [facility] [address] 
[phone] Patient: Lucy Carson. MRN: [id number] :1965 Visit Information Date & Time Provider Department Dept. Phone Encounter #  
 2018 10:30 AM Sonal Gerard MD  [facility] [phone] Your Appointments 2018  7:15 AM  
Office Visit with Kinjal Gary MD  
[facility]) Appt Note: 3 mo f/u; 3 mo f/u  
 [address]  [facility] [address]  
[phone]  
  
   
 [address]  
  
    
 2018 10:30 AM  
ESTABLISHED PATIENT with Josseline Zhang MD  
[facility]) Appt Note: Return in about 6 months (around 2018) for PSA F/T prior and KATIA  
 [address] 2 [facility] [address]  
  
    
 10/18/2018 10:15 AM  
ESTABLISHED PATIENT with Josseline Zhang MD  
[facility]) Appt Note: Return in about 1 year (around 10/19/2018) for Flow/PVR, PSA prior, BPH, Elevated PSA  
 [address] [address]  
[phone] Upcoming Health Maintenance Date Due DTaP/Tdap/Td series (1 - Tdap) 1985 Influenza Age 5 to Adult 2018 MEDICARE YEARLY EXAM 2019 COLONOSCOPY 2022 Allergies as of 2018  Review Complete On: 2018 By: Kinjal Gary MD  
 No Known Allergies Current Immunizations  Never Reviewed No immunizations on file. Not reviewed this visit You Were Diagnosed With   
  
 Codes Comments Chronic pain syndrome    -  Primary ICD-10-CM: G89.4 ICD-9-CM: 338.4 Annular tear of lumbar disc     ICD-10-CM: M51.36 
ICD-9-CM: 722.52  Chronic low back pain without sciatica, unspecified back pain laterality ICD-10-CM: M54.5, G89.29 ICD-9-CM: 724.2, 338.29 Vitals BP Pulse Temp Resp Height(growth percentile) Weight(growth percentile) (!) 145/94 68 98.3 °F (36.8 °C) 18 6' (1.829 m) 266 lb (120.7 kg) SpO2 BMI Smoking Status 98% 36.08 kg/m2 Former Smoker BMI and BSA Data Body Mass Index Body Surface Area 36.08 kg/m 2 2.48 m 2 Preferred Pharmacy Pharmacy Name Phone [place], [address] [phone] Your Updated Medication List  
  
   
This list is accurate as of 5/7/18 11:12 AM.  Always use your most recent med list.  
  
  
  
  
 acetaminophen-codeine 300-30 mg per tablet Commonly known as:  TYLENOL #3  
TAKE ONE TABLET BY MOUTH THREE TIMES DAILY AS NEEDED FOR PAIN (MAX DAILY AMOUNT: 3 TABS)  
  
 albuterol 90 mcg/actuation inhaler Commonly known as:  PROVENTIL HFA, VENTOLIN HFA, PROAIR HFA Take  by inhalation. allopurinol 100 mg tablet Commonly known as:  [employer]uelas[employer] Take 1 Tab by mouth daily. Indications: prevention of acute gout attack  
  
 budesonide-formoterol 80-4.5 mcg/actuation Hfaa Commonly known as:  SYMBICORT Take 2 Puffs by inhalation two (2) times a day. fenofibrate nanocrystallized 145 mg tablet Commonly known as:  [employer] Take 1 Tab by mouth daily. gabapentin 600 mg tablet Commonly known as:  NEURONTIN Take 1 Tab by mouth nightly. naloxone 2 mg/actuation Spry Commonly known as:  [employer] Use 1 spray intranasally into 1 nostril. Use a new Narcan nasal spray for subsequent doses and administer into alternating nostrils. May repeat every 2 to 3 minutes as needed. ondansetron 4 mg disintegrating tablet Commonly known as:  ZOFRAN ODT Take 1 Tab by mouth every eight (8) hours as needed for Nausea. tamsulosin 0.4 mg capsule Commonly known as:  FLOMAX Take 1 Cap by mouth daily (after dinner). zolpidem 5 mg tablet Commonly known as:  AMBIEN  
TAKE ONE TABLET BY MOUTH NIGHTLY AS NEEDED FOR SLEEP. MAXIMUM DAILY AMOUNT: 5 MG  
  
  
  
  
Prescriptions Sent to Pharmacy Refills  
 gabapentin (NEURONTIN) 600 mg tablet 5 Sig: Take 1 Tab by mouth nightly. Class: Normal  
 Pharmacy: [facility] [address] [place] [address], [address] [place] [address] #: [phone] Route: Oral  
  
We Performed the Following REFERRAL TO PAIN MANAGEMENT [UYW924 Custom] Comments:  
 Chronic pain management Referral Information Referral ID Referred By Referred To  
  
 [id number] SHARLENE Britt Not Available Visits Status Start Date End Date 1 New Request 5/7/18 5/7/19 If your referral has a status of pending review or denied, additional information will be sent to support the outcome of this decision. Introducing \[facility]\"" & HEALTH SERVICES! Karl Portillo introduces [employer] patient portal. Now you can access parts of your medical record, email your doctor's office, and request medication refills online. 1. In your internet browser, go to [email or web address] 2. Click on the First Time User? Click Here link in the Sign In box. You will see the New Member Sign Up page. 3. Enter your [employer] Access Code exactly as it appears below. You will not need to use this code after youve completed the sign-up process. If you do not sign up before the expiration date, you must request a new code. · [employer] Access Code: [email or web address] Expires: 8/5/2018 11:12 AM 
 
4. Enter the last four digits of your Social Security Number (xxxx) and Date of Birth (mm/dd/yyyy) as indicated and click Submit. You will be taken to the next sign-up page. 5. Create a [employer] ID. This will be your [employer] login ID and cannot be changed, so think of one that is secure and easy to remember. 6. Create a [employer] password. You can change your password at any time. 7. Enter your Password Reset Question and Answer. This can be used at a later time if you forget your password. 8. Enter your e-mail address. You will receive e-mail notification when new information is available in [address] 9. Click Sign Up. You can now view and download portions of your medical record. 10. Click the Download Summary menu link to download a portable copy of your medical information. If you have questions, please visit the Frequently Asked Questions section of the [employer] website. Remember, [employer] is NOT to be used for urgent needs. For medical emergencies, dial 911. Now available from your iPhone and Android! Please provide this summary of care documentation to your next provider. Your primary care clinician is listed as Ankush Moser. If you have any questions after today's visit, please call [phone].
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
## 2018-05-08 ENCOUNTER — OFFICE VISIT (OUTPATIENT)
Dept: FAMILY MEDICINE CLINIC | Age: 53
End: 2018-05-08

## 2018-05-08 VITALS
HEIGHT: 72 IN | RESPIRATION RATE: 16 BRPM | TEMPERATURE: 97.8 F | BODY MASS INDEX: 37.11 KG/M2 | OXYGEN SATURATION: 98 % | SYSTOLIC BLOOD PRESSURE: 136 MMHG | DIASTOLIC BLOOD PRESSURE: 90 MMHG | HEART RATE: 84 BPM | WEIGHT: 274 LBS

## 2018-05-08 DIAGNOSIS — G89.29 CHRONIC LOW BACK PAIN WITHOUT SCIATICA, UNSPECIFIED BACK PAIN LATERALITY: ICD-10-CM

## 2018-05-08 DIAGNOSIS — G47.00 INSOMNIA, UNSPECIFIED TYPE: ICD-10-CM

## 2018-05-08 DIAGNOSIS — M50.30 DEGENERATIVE DISC DISEASE, CERVICAL: ICD-10-CM

## 2018-05-08 DIAGNOSIS — G89.4 CHRONIC PAIN SYNDROME: ICD-10-CM

## 2018-05-08 DIAGNOSIS — M51.36 ANNULAR TEAR OF LUMBAR DISC: ICD-10-CM

## 2018-05-08 DIAGNOSIS — M54.50 CHRONIC MIDLINE LOW BACK PAIN WITHOUT SCIATICA: ICD-10-CM

## 2018-05-08 DIAGNOSIS — G89.29 CHRONIC MIDLINE LOW BACK PAIN WITHOUT SCIATICA: ICD-10-CM

## 2018-05-08 DIAGNOSIS — G89.29 ENCOUNTER FOR CHRONIC PAIN MANAGEMENT: ICD-10-CM

## 2018-05-08 DIAGNOSIS — M54.50 CHRONIC LOW BACK PAIN WITHOUT SCIATICA, UNSPECIFIED BACK PAIN LATERALITY: ICD-10-CM

## 2018-05-08 RX ORDER — ACETAMINOPHEN AND CODEINE PHOSPHATE 300; 30 MG/1; MG/1
TABLET ORAL
Qty: 90 TAB | Refills: 0 | Status: SHIPPED | OUTPATIENT
Start: 2018-05-08 | End: 2018-06-06 | Stop reason: SDUPTHER

## 2018-05-08 RX ORDER — GABAPENTIN 600 MG/1
600 TABLET ORAL
Qty: 30 TAB | Refills: 5 | OUTPATIENT
Start: 2018-05-08

## 2018-05-08 RX ORDER — ZOLPIDEM TARTRATE 5 MG/1
TABLET ORAL
Qty: 30 TAB | Refills: 2 | Status: SHIPPED | OUTPATIENT
Start: 2018-05-08 | End: 2018-08-06 | Stop reason: SDUPTHER

## 2018-05-08 RX ORDER — ACETAMINOPHEN AND CODEINE PHOSPHATE 300; 30 MG/1; MG/1
TABLET ORAL
Qty: 90 TAB | Refills: 0 | OUTPATIENT
Start: 2018-05-08

## 2018-05-08 RX ORDER — ZOLPIDEM TARTRATE 5 MG/1
TABLET ORAL
Qty: 30 TAB | Refills: 0 | OUTPATIENT
Start: 2018-05-08

## 2018-05-08 NOTE — LETTER
5/8/2018 9:08 AM 
 
Mr. Kaitlni Urban. [address] [place] [id number] To whom it may concern: 
 
 I have been treating Mr. Vashti Rangel since July 8, 2017. He has degenerative disc disease in his cervical spine, chronic neck pain and chronic lower back pain. His pain has been controlled on a regimen of Gabapentin 600 mg at night, and Tylenol #3, 1 tablet 3 times daily. He has signed a Controlled Substance Contract, has been compliant with the terms of the contract, and has passed urine drug screens regularly. There has been no aberrancy with his medications according to the [employer]. I have referred him to our orthopedic spine specialist and he has said that surgical intervention isn't indicated and recommended pain management. A referral to pain management is pending at this time. He also takes a low dose of Ambien (5 mg) at night for insomnia. He has also filled a prescription for Naloxone nasal spray to have available because he takes Tylenol #3 and Ambien. Sincerely, Letty Perez MD

## 2018-05-08 NOTE — PROGRESS NOTES
Rm 1  Pt presents to the clinic for a follow-up regarding his workers comp case. Pt wants to discuss his letter received last time. Depression Screening:  PHQ over the last two weeks 5/8/2018 4/6/2018 11/6/2017 7/24/2017 7/18/2017   Little interest or pleasure in doing things Not at all Not at all Not at all Not at all Not at all   Feeling down, depressed or hopeless Not at all Not at all Not at all Not at all Not at all   Total Score PHQ 2 0 0 0 0 0       Learning Assessment:  Learning Assessment 7/18/2017   PRIMARY LEARNER Patient   HIGHEST LEVEL OF EDUCATION - PRIMARY LEARNER  > 4 YEARS OF COLLEGE   BARRIERS PRIMARY LEARNER NONE   CO-LEARNER CAREGIVER No   PRIMARY LANGUAGE ENGLISH   LEARNER PREFERENCE PRIMARY DEMONSTRATION   ANSWERED BY patient   RELATIONSHIP SELF       Abuse Screening:  No flowsheet data found. Health Maintenance reviewed and discussed per provider: yes     Coordination of Care:    1. Have you been to the ER, urgent care clinic since your last visit? Hospitalized since your last visit? no    2. Have you seen or consulted any other health care providers outside of the [address] [place] [address] since your last visit? Include any pap smears or colon screening.  no

## 2018-05-08 NOTE — PROGRESS NOTES
HISTORY OF PRESENT ILLNESS  Saadia Hudson is a 47 y.o. male. HPI Comments: Mr. Sonny Cruz is here for a refill of a couple of his pain medications. He received a letter from the [employer] last month stating that all of the medications that he was taking are not indicated according to their guidelines. I referred him to Dr. Jazmin Mcgrath for a second opinion and he renewed the Gabapentin and referred him to pain management. He agreed that their was nothing surgical that would help him.  reviewed, no aberration. Back Pain    Pertinent negatives include no chest pain, no fever, no headaches and no abdominal pain. Review of Systems   Constitutional: Negative for chills and fever. Eyes: Negative for blurred vision and double vision. Respiratory: Negative for cough and shortness of breath. Cardiovascular: Negative for chest pain and palpitations. Gastrointestinal: Negative for abdominal pain, nausea and vomiting. Musculoskeletal: Positive for back pain, myalgias and neck pain. Neurological: Negative for headaches. Physical Exam   Constitutional: He is oriented to person, place, and time. He appears well-developed and well-nourished. Eyes: Pupils are equal, round, and reactive to light. Neck: Neck supple. Cardiovascular: Normal rate and regular rhythm. Pulmonary/Chest: Effort normal and breath sounds normal. No respiratory distress. He has no wheezes. He has no rales. Abdominal: Soft. He exhibits no distension. There is no tenderness. Musculoskeletal: He exhibits tenderness. Tender lower back, decreased ROM   Lymphadenopathy:     He has no cervical adenopathy. Neurological: He is alert and oriented to person, place, and time. Nursing note and vitals reviewed. ASSESSMENT and PLAN    ICD-10-CM ICD-9-CM    1. Chronic midline low back pain without sciatica M54.5 724.2 acetaminophen-codeine (TYLENOL #3) 300-30 mg per tablet    G89.29 338.29    2. Degenerative disc disease, cervical M50.30 722.4 acetaminophen-codeine (TYLENOL #3) 300-30 mg per tablet   3. Encounter for chronic pain management G89.29 V65.49 acetaminophen-codeine (TYLENOL #3) 300-30 mg per tablet   4. Insomnia, unspecified type G47.00 780.52 zolpidem (AMBIEN) 5 mg tablet       Meds refilled. Will write a note stating he has been compliant with his contract and drug screens have been fine. Letter composed for his .

## 2018-05-08 NOTE — TELEPHONE ENCOUNTER
Requested Prescriptions     Pending Prescriptions Disp Refills    gabapentin (NEURONTIN) 600 mg tablet 30 Tab 5     Sig: Take 1 Tab by mouth nightly.  zolpidem (AMBIEN) 5 mg tablet 30 Tab 0     Sig: TAKE ONE TABLET BY MOUTH NIGHTLY AS NEEDED FOR SLEEP.  MAXIMUM DAILY AMOUNT: 5 MG    acetaminophen-codeine (TYLENOL #3) 300-30 mg per tablet 90 Tab 0     Sig: TAKE ONE TABLET BY MOUTH THREE TIMES DAILY AS NEEDED FOR PAIN (MAX DAILY AMOUNT: 3 TABS)

## 2018-05-10 ENCOUNTER — TELEPHONE (OUTPATIENT)
Dept: ORTHOPEDIC SURGERY | Age: 53
End: 2018-05-10

## 2018-05-10 NOTE — TELEPHONE ENCOUNTER
Patient called stating Dr. Sanjuana Montoya told him he would be getting a call on 05/08/18 to let him know when his appointment would be with pain management and he was asking for a status update on that. Please advise patient regarding this at [phone].

## 2018-05-14 ENCOUNTER — TELEPHONE (OUTPATIENT)
Dept: ORTHOPEDIC SURGERY | Age: 53
End: 2018-05-14

## 2018-05-14 NOTE — TELEPHONE ENCOUNTER
Spoke with patient, he informed me that this visit is under his W/C and the referral to pain management needs to be also. I will contact his  and get approval and then refer to PM. Patient verbalized understanding.

## 2018-05-14 NOTE — TELEPHONE ENCOUNTER
Order and office notes faxed to attn of Wisam Becerra with [address] for auth to [facility], auth PENDING.

## 2018-06-06 DIAGNOSIS — G47.00 INSOMNIA, UNSPECIFIED TYPE: ICD-10-CM

## 2018-06-06 DIAGNOSIS — M54.50 CHRONIC MIDLINE LOW BACK PAIN WITHOUT SCIATICA: ICD-10-CM

## 2018-06-06 DIAGNOSIS — G89.29 ENCOUNTER FOR CHRONIC PAIN MANAGEMENT: ICD-10-CM

## 2018-06-06 DIAGNOSIS — M50.30 DEGENERATIVE DISC DISEASE, CERVICAL: ICD-10-CM

## 2018-06-06 DIAGNOSIS — G89.29 CHRONIC MIDLINE LOW BACK PAIN WITHOUT SCIATICA: ICD-10-CM

## 2018-06-06 RX ORDER — ACETAMINOPHEN AND CODEINE PHOSPHATE 300; 30 MG/1; MG/1
TABLET ORAL
Qty: 90 TAB | Refills: 0 | Status: SHIPPED | OUTPATIENT
Start: 2018-06-07 | End: 2018-08-01 | Stop reason: SDUPTHER

## 2018-06-06 RX ORDER — ZOLPIDEM TARTRATE 5 MG/1
TABLET ORAL
Qty: 30 TAB | Refills: 2 | OUTPATIENT
Start: 2018-06-06

## 2018-08-01 DIAGNOSIS — M50.30 DEGENERATIVE DISC DISEASE, CERVICAL: ICD-10-CM

## 2018-08-01 DIAGNOSIS — M54.50 CHRONIC MIDLINE LOW BACK PAIN WITHOUT SCIATICA: ICD-10-CM

## 2018-08-01 DIAGNOSIS — G89.29 ENCOUNTER FOR CHRONIC PAIN MANAGEMENT: ICD-10-CM

## 2018-08-01 DIAGNOSIS — G89.29 CHRONIC MIDLINE LOW BACK PAIN WITHOUT SCIATICA: ICD-10-CM

## 2018-08-01 RX ORDER — ACETAMINOPHEN AND CODEINE PHOSPHATE 300; 30 MG/1; MG/1
TABLET ORAL
Qty: 90 TAB | Refills: 0 | Status: SHIPPED | OUTPATIENT
Start: 2018-08-01 | End: 2018-10-08 | Stop reason: SDUPTHER

## 2018-08-06 ENCOUNTER — HOSPITAL ENCOUNTER (OUTPATIENT)
Dept: LAB | Age: 53
Discharge: HOME | End: 2018-08-06
Payer: MEDICARE

## 2018-08-06 ENCOUNTER — OFFICE VISIT (OUTPATIENT)
Dept: FAMILY MEDICINE CLINIC | Age: 53
End: 2018-08-06

## 2018-08-06 VITALS
SYSTOLIC BLOOD PRESSURE: 137 MMHG | RESPIRATION RATE: 18 BRPM | OXYGEN SATURATION: 97 % | DIASTOLIC BLOOD PRESSURE: 85 MMHG | BODY MASS INDEX: 35.35 KG/M2 | WEIGHT: 261 LBS | TEMPERATURE: 98.1 F | HEIGHT: 72 IN | HEART RATE: 64 BPM

## 2018-08-06 DIAGNOSIS — G47.00 INSOMNIA, UNSPECIFIED TYPE: ICD-10-CM

## 2018-08-06 DIAGNOSIS — G89.29 ENCOUNTER FOR CHRONIC PAIN MANAGEMENT: ICD-10-CM

## 2018-08-06 DIAGNOSIS — J45.20 MILD INTERMITTENT ASTHMA WITHOUT COMPLICATION: ICD-10-CM

## 2018-08-06 DIAGNOSIS — M50.30 DEGENERATIVE DISC DISEASE, CERVICAL: ICD-10-CM

## 2018-08-06 DIAGNOSIS — M54.50 CHRONIC MIDLINE LOW BACK PAIN WITHOUT SCIATICA: ICD-10-CM

## 2018-08-06 DIAGNOSIS — G89.29 CHRONIC MIDLINE LOW BACK PAIN WITHOUT SCIATICA: ICD-10-CM

## 2018-08-06 DIAGNOSIS — G89.29 CHRONIC MIDLINE LOW BACK PAIN WITHOUT SCIATICA: Primary | ICD-10-CM

## 2018-08-06 DIAGNOSIS — M54.50 CHRONIC MIDLINE LOW BACK PAIN WITHOUT SCIATICA: Primary | ICD-10-CM

## 2018-08-06 PROCEDURE — 80307 DRUG TEST PRSMV CHEM ANLYZR: CPT | Performed by: FAMILY MEDICINE

## 2018-08-06 RX ORDER — ZOLPIDEM TARTRATE 5 MG/1
TABLET ORAL
Qty: 30 TAB | Refills: 2 | Status: SHIPPED | OUTPATIENT
Start: 2018-08-06 | End: 2018-11-05 | Stop reason: SDUPTHER

## 2018-08-06 RX ORDER — BUDESONIDE AND FORMOTEROL FUMARATE DIHYDRATE 80; 4.5 UG/1; UG/1
2 AEROSOL RESPIRATORY (INHALATION) 2 TIMES DAILY
Qty: 1 INHALER | Refills: 5 | Status: SHIPPED | OUTPATIENT
Start: 2018-08-06 | End: 2018-11-05

## 2018-08-06 RX ORDER — ACETAMINOPHEN AND CODEINE PHOSPHATE 300; 30 MG/1; MG/1
TABLET ORAL
Qty: 90 TAB | Refills: 0 | Status: CANCELLED | OUTPATIENT
Start: 2018-08-06

## 2018-08-06 NOTE — PROGRESS NOTES
Rm:1    Chief Complaint   Patient presents with    LOW BACK PAIN    Neck Pain     Depression Screening:  PHQ over the last two weeks 5/8/2018 4/6/2018 11/6/2017 7/24/2017 7/18/2017   Little interest or pleasure in doing things Not at all Not at all Not at all Not at all Not at all   Feeling down, depressed, irritable, or hopeless Not at all Not at all Not at all Not at all Not at all   Total Score PHQ 2 0 0 0 0 0       Learning Assessment:  Learning Assessment 7/18/2017   PRIMARY LEARNER Patient   HIGHEST LEVEL OF EDUCATION - PRIMARY LEARNER  > 4 YEARS OF COLLEGE   BARRIERS PRIMARY LEARNER NONE   CO-LEARNER CAREGIVER No   PRIMARY LANGUAGE ENGLISH   LEARNER PREFERENCE PRIMARY DEMONSTRATION   ANSWERED BY patient   RELATIONSHIP SELF       Abuse Screening:  No flowsheet data found. Health Maintenance reviewed and discussed per provider: yes     Coordination of Care:    1. Have you been to the ER, urgent care clinic since your last visit? Hospitalized since your last visit? no    2. Have you seen or consulted any other health care providers outside of the [address] [place] [address] since your last visit? Include any pap smears or colon screening.  no

## 2018-08-06 NOTE — PATIENT INSTRUCTIONS
Continue efforts at weight loss, increasing exercise, and decreasing the use of Tylenol #3. Recheck in 3 months.

## 2018-08-06 NOTE — MR AVS SNAPSHOT
[address] [place] [phone] 
[phone] Patient: Nannette Kern. MRN: [id number] :1965 Visit Information Date & Time Provider Department Dept. Phone Encounter #  
 2018  7:15 AM Stephanie Kuhn, [facility] [phone] [id number] Follow-up Instructions Return in about 3 months (around 2018). Your Appointments 2018 10:30 AM  
ESTABLISHED PATIENT with Christine Mendez MD  
[facility]. [address] ([facility]) Appt Note: Return in about 6 months (around 2018) for PSA F/T prior and KATIA  
 [address] [phone]  
  
    
 10/18/2018 10:15 AM  
ESTABLISHED PATIENT with Christine Mendez MD  
[facility]. [address] [facility]) Appt Note: Return in about 1 year (around 10/19/2018) for Flow/PVR, PSA prior, BPH, Elevated PSA  
 [address] [place] [address]  
[phone] Upcoming Health Maintenance Date Due DTaP/Tdap/Td series (1 - Tdap) 1985 Influenza Age 5 to Adult 2018 MEDICARE YEARLY EXAM 2019 COLONOSCOPY 2022 Allergies as of 2018  Review Complete On: 2018 By: Alexi Faria LPN Severity Noted Reaction Type Reactions Bee Venom Protein (Honey Bee)  2018    Swelling Current Immunizations  Never Reviewed No immunizations on file. Not reviewed this visit You Were Diagnosed With   
  
 Codes Comments Mild intermittent asthma without complication    -  Primary ICD-10-CM: J45.20 ICD-9-CM: 493.90 Chronic midline low back pain without sciatica     ICD-10-CM: M54.5, G89.29 ICD-9-CM: 724.2, 338.29 Degenerative disc disease, cervical     ICD-10-CM: M50.30 ICD-9-CM: 722.4  Encounter for chronic pain management     ICD-10-CM: G89.29 
 ICD-9-CM: V65.49 Insomnia, unspecified type     ICD-10-CM: G47.00 ICD-9-CM: 780.52 Vitals BP Pulse Temp Resp Height(growth percentile) Weight(growth percentile) 137/85 (BP 1 Location: Left arm, BP Patient Position: Sitting) 64 98.1 °F (36.7 °C) (Oral) 18 6' (1.829 m) 261 lb (118.4 kg) SpO2 BMI Smoking Status 97% 35.4 kg/m2 Former Smoker Vitals History BMI and BSA Data Body Mass Index Body Surface Area  
 35.4 kg/m 2 2.45 m 2 Preferred Pharmacy Pharmacy Name Phone [place], [address] [phone] Your Updated Medication List  
  
   
This list is accurate as of 8/6/18  7:57 AM.  Always use your most recent med list.  
  
  
  
  
 acetaminophen-codeine 300-30 mg per tablet Commonly known as:  TYLENOL #3  
TAKE 1 TABLET BY MOUTH THREE TIMES DAILY AS NEEDED FOR PAIN MAX DAILY AMOUNT 3 TABLETS  
  
 albuterol 90 mcg/actuation inhaler Commonly known as:  PROVENTIL HFA, VENTOLIN HFA, PROAIR HFA Take  by inhalation. allopurinol 100 mg tablet Commonly known as:  Aldiemat Pickettman Take 1 Tab by mouth daily. Indications: prevention of acute gout attack  
  
 budesonide-formoterol 80-4.5 mcg/actuation [employer] Commonly known as:  SYMBICORT Take 2 Puffs by inhalation two (2) times a day. fenofibrate nanocrystallized 145 mg tablet Commonly known as:  [employer] Take 1 Tab by mouth daily. gabapentin 600 mg tablet Commonly known as:  NEURONTIN Take 1 Tab by mouth nightly. naloxone 2 mg/actuation Spry Commonly known as:  [employer] Use 1 spray intranasally into 1 nostril. Use a new Narcan nasal spray for subsequent doses and administer into alternating nostrils. May repeat every 2 to 3 minutes as needed. tamsulosin 0.4 mg capsule Commonly known as:  FLOMAX Take 1 Cap by mouth daily (after dinner). zolpidem 5 mg tablet Commonly known as:  AMBIEN  
TAKE ONE TABLET BY MOUTH NIGHTLY AS NEEDED FOR SLEEP. MAXIMUM DAILY AMOUNT: 5 MG  
  
  
  
  
Prescriptions Printed Refills  
 zolpidem (AMBIEN) 5 mg tablet 2 Sig: TAKE ONE TABLET BY MOUTH NIGHTLY AS NEEDED FOR SLEEP. MAXIMUM DAILY AMOUNT: 5 MG Class: Print Prescriptions Sent to Pharmacy Refills  
 budesonide-formoterol (SYMBICORT) 80-4.5 mcg/actuation HFAA 5 Sig: Take 2 Puffs by inhalation two (2) times a day. Class: Normal  
 Pharmacy: [facility] [address] [place] [address], [address] [place] [address] #: [phone] Route: Inhalation Follow-up Instructions Return in about 3 months (around 11/6/2018). Patient Instructions Continue efforts at weight loss, increasing exercise, and decreasing the use of Tylenol #3. Recheck in 3 months. Introducing [facility]! Hugo Phillips introduces eOriginal patient portal. Now you can access parts of your medical record, email your doctor's office, and request medication refills online. 1. In your internet browser, go to [email or web address] 2. Click on the First Time User? Click Here link in the Sign In box. You will see the New Member Sign Up page. 3. Enter your eOriginal Access Code exactly as it appears below. You will not need to use this code after youve completed the sign-up process. If you do not sign up before the expiration date, you must request a new code. · eOriginal Access Code: [email or web address] Expires: 11/4/2018  7:57 AM 
 
4. Enter the last four digits of your Social Security Number (xxxx) and Date of Birth (mm/dd/yyyy) as indicated and click Submit. You will be taken to the next sign-up page. 5. Create a [employer] ID. This will be your eOriginal login ID and cannot be changed, so think of one that is secure and easy to remember. 6. Create a [employer] password. You can change your password at any time. 7. Enter your Password Reset Question and Answer. This can be used at a later time if you forget your password. 8. Enter your e-mail address. You will receive e-mail notification when new information is available in [address] 9. Click Sign Up. You can now view and download portions of your medical record. 10. Click the Download Summary menu link to download a portable copy of your medical information. If you have questions, please visit the Frequently Asked Questions section of the [employer] website. Remember, InsideView is NOT to be used for urgent needs. For medical emergencies, dial 911. Now available from your iPhone and Android! Please provide this summary of care documentation to your next provider. Your primary care clinician is listed as Ankush Moser. If you have any questions after today's visit, please call [phone].

## 2018-08-06 NOTE — PROGRESS NOTES
HISTORY OF PRESENT ILLNESS  Daniel Lisa is a 47 y.o. male. HPI Comments: Mr. Lorenzo Khan is here for follow up of his lower back pain and neck pain. He has not heard from pain management (Dr. Albert Carr referred him). He is trying to lose weight in hopes that the pain will improvement. He is also trying to use his Tyl #3 only when absolutely needed, some days only one, most days 2 and occasionally a day with 3 tabs. His pain is lower back, radiating down R leg with numbness in his R thigh. No bladder or bowel issues. He also has neck pain, radiating down his R arm. Daniel Johnston. RTC today to follow up on chronic pain diagnosis. We discussed his osteoarthritis and radiculopathy that is affecting his neck and lower back. Significant changes since last visit: none. He is  able to do his normal daily activities, to an extent. He reports the following adverse side effects: none. Least pain over the last week has been 7/10. Worst pain over the last week has been 10/10. Opioid Risk Tool Reviewed: NO:     Aberrant behaviors: None. Urine Drug Screen: due - order placed. Controlled substance agreement on file: YES.  reviewed:yes    Pill count is consistent with his prescription: yes and n/a    Concomitant use of a benzodiazepine: no    N/A    Naloxone prescription is warranted. It has been provided or is already on file. Also,  abstinence syndrome was reviewed and discussed with her today N/A    He also needs a refill of his Symbicort, his asthma acts up in the fall. LOW BACK PAIN   Pertinent negatives include no chest pain. Neck Pain   Pertinent negatives include no chest pain. Review of Systems   Constitutional: Negative for fever. Eyes: Negative for blurred vision. Respiratory: Negative for cough. Cardiovascular: Negative for chest pain. Gastrointestinal: Negative for heartburn, nausea and vomiting.    Genitourinary: Negative for dysuria, frequency and urgency. Musculoskeletal: Positive for back pain, myalgias and neck pain. Neurological: Positive for tingling. Negative for sensory change and focal weakness. Psychiatric/Behavioral: Negative for depression. Physical Exam   Constitutional: He is oriented to person, place, and time. He appears well-developed and well-nourished. Eyes: Pupils are equal, round, and reactive to light. Neck: Neck supple. No thyromegaly present. Cardiovascular: Normal rate and regular rhythm. Pulmonary/Chest: Effort normal and breath sounds normal.   Abdominal: Soft. There is no tenderness. Musculoskeletal:   c-spine tender, decreased ROM, muscles tight. Lower back diffusely tender, + SLR, decreased ROM. Strength ok, no localizing neuro signs. Lymphadenopathy:     He has no cervical adenopathy. Neurological: He is alert and oriented to person, place, and time. Nursing note and vitals reviewed. ASSESSMENT and PLAN    ICD-10-CM ICD-9-CM    1. Chronic midline low back pain without sciatica M54.5 724.2 COMPLIANCE DRUG SCREEN/PRESCRIPTION MONITORING    G89.29 338.29    2. Degenerative disc disease, cervical M50.30 722.4 COMPLIANCE DRUG SCREEN/PRESCRIPTION MONITORING   3. Encounter for chronic pain management G89.29 V65.49 COMPLIANCE DRUG SCREEN/PRESCRIPTION MONITORING   4. Insomnia, unspecified type G47.00 780.52 zolpidem (AMBIEN) 5 mg tablet   5. Mild intermittent asthma without complication Q76.93 414.20        See orders.

## 2018-08-09 LAB — DRUGS UR: NORMAL

## 2018-10-08 DIAGNOSIS — G89.29 CHRONIC MIDLINE LOW BACK PAIN WITHOUT SCIATICA: ICD-10-CM

## 2018-10-08 DIAGNOSIS — G89.29 ENCOUNTER FOR CHRONIC PAIN MANAGEMENT: ICD-10-CM

## 2018-10-08 DIAGNOSIS — M50.30 DEGENERATIVE DISC DISEASE, CERVICAL: ICD-10-CM

## 2018-10-08 DIAGNOSIS — M54.50 CHRONIC MIDLINE LOW BACK PAIN WITHOUT SCIATICA: ICD-10-CM

## 2018-10-08 RX ORDER — ACETAMINOPHEN AND CODEINE PHOSPHATE 300; 30 MG/1; MG/1
TABLET ORAL
Qty: 90 TAB | Refills: 0 | Status: SHIPPED | OUTPATIENT
Start: 2018-10-08 | End: 2018-11-05 | Stop reason: SDUPTHER

## 2018-10-08 NOTE — TELEPHONE ENCOUNTER
Requested Prescriptions     Pending Prescriptions Disp Refills    acetaminophen-codeine (TYLENOL #3) 300-30 mg per tablet 90 Tab 0

## 2018-11-05 ENCOUNTER — OFFICE VISIT (OUTPATIENT)
Dept: FAMILY MEDICINE CLINIC | Age: 53
End: 2018-11-05

## 2018-11-05 VITALS
BODY MASS INDEX: 35.35 KG/M2 | WEIGHT: 261 LBS | OXYGEN SATURATION: 97 % | DIASTOLIC BLOOD PRESSURE: 84 MMHG | SYSTOLIC BLOOD PRESSURE: 122 MMHG | TEMPERATURE: 97.7 F | HEART RATE: 67 BPM | RESPIRATION RATE: 18 BRPM | HEIGHT: 72 IN

## 2018-11-05 DIAGNOSIS — M51.36 ANNULAR TEAR OF LUMBAR DISC: ICD-10-CM

## 2018-11-05 DIAGNOSIS — G89.29 CHRONIC LOW BACK PAIN WITHOUT SCIATICA, UNSPECIFIED BACK PAIN LATERALITY: ICD-10-CM

## 2018-11-05 DIAGNOSIS — G89.29 CHRONIC MIDLINE LOW BACK PAIN WITHOUT SCIATICA: Primary | ICD-10-CM

## 2018-11-05 DIAGNOSIS — G89.29 ENCOUNTER FOR CHRONIC PAIN MANAGEMENT: ICD-10-CM

## 2018-11-05 DIAGNOSIS — M54.50 CHRONIC LOW BACK PAIN WITHOUT SCIATICA, UNSPECIFIED BACK PAIN LATERALITY: ICD-10-CM

## 2018-11-05 DIAGNOSIS — G89.4 CHRONIC PAIN SYNDROME: ICD-10-CM

## 2018-11-05 DIAGNOSIS — G47.00 INSOMNIA, UNSPECIFIED TYPE: ICD-10-CM

## 2018-11-05 DIAGNOSIS — M54.50 CHRONIC MIDLINE LOW BACK PAIN WITHOUT SCIATICA: Primary | ICD-10-CM

## 2018-11-05 DIAGNOSIS — M50.30 DEGENERATIVE DISC DISEASE, CERVICAL: ICD-10-CM

## 2018-11-05 RX ORDER — DULOXETIN HYDROCHLORIDE 60 MG/1
60 CAPSULE, DELAYED RELEASE ORAL DAILY
Qty: 30 CAP | Refills: 3 | Status: SHIPPED | OUTPATIENT
Start: 2018-11-05 | End: 2018-11-05 | Stop reason: SDUPTHER

## 2018-11-05 RX ORDER — ACETAMINOPHEN AND CODEINE PHOSPHATE 300; 30 MG/1; MG/1
TABLET ORAL
Qty: 90 TAB | Refills: 0 | Status: SHIPPED | OUTPATIENT
Start: 2018-11-10 | End: 2019-02-04 | Stop reason: SDUPTHER

## 2018-11-05 RX ORDER — ZOLPIDEM TARTRATE 5 MG/1
TABLET ORAL
Qty: 30 TAB | Refills: 2 | Status: SHIPPED | OUTPATIENT
Start: 2018-11-05 | End: 2019-02-04 | Stop reason: SDUPTHER

## 2018-11-05 RX ORDER — DULOXETIN HYDROCHLORIDE 30 MG/1
30 CAPSULE, DELAYED RELEASE ORAL DAILY
Qty: 7 CAP | Refills: 0 | Status: SHIPPED | OUTPATIENT
Start: 2018-11-05 | End: 2018-11-05 | Stop reason: SDUPTHER

## 2018-11-05 RX ORDER — GABAPENTIN 600 MG/1
TABLET ORAL
Qty: 30 TAB | Refills: 0 | Status: SHIPPED | OUTPATIENT
Start: 2018-11-05 | End: 2018-12-03 | Stop reason: ALTCHOICE

## 2018-11-05 NOTE — PROGRESS NOTES
Rm:1 
 
Chief Complaint Patient presents with  Back Pain  
  3 month f/u workers comp  Neck Pain Depression Screening: PHQ over the last two weeks 5/8/2018 4/6/2018 11/6/2017 7/24/2017 7/18/2017 Little interest or pleasure in doing things Not at all Not at all Not at all Not at all Not at all Feeling down, depressed, irritable, or hopeless Not at all Not at all Not at all Not at all Not at all Total Score PHQ 2 0 0 0 0 0 Learning Assessment: 
Learning Assessment 7/18/2017 PRIMARY LEARNER Patient HIGHEST LEVEL OF EDUCATION - PRIMARY LEARNER  > 4 YEARS OF COLLEGE  
BARRIERS PRIMARY LEARNER NONE  
CO-LEARNER CAREGIVER No  
PRIMARY LANGUAGE ENGLISH  
LEARNER PREFERENCE PRIMARY DEMONSTRATION  
ANSWERED BY patient RELATIONSHIP SELF Abuse Screening: No flowsheet data found. Health Maintenance reviewed and discussed per provider: yes Coordination of Care: 1. Have you been to the ER, urgent care clinic since your last visit? Hospitalized since your last visit? no 
 
2. Have you seen or consulted any other health care providers outside of the [address] [place] [address] since your last visit? Include any pap smears or colon screening. No 
 
Pt declined flu shot

## 2018-11-05 NOTE — PATIENT INSTRUCTIONS
I have refilled your medications, you can't fill the Tylenol #3 until the 10th. For the chronic pain, I am starting you on Cymbalta. Take 30 mg daily for the first 7 days, then 60 mg daily after that. This will hopefully lessen the amount of narcotic pain medications that you need. Recheck 3 months

## 2018-11-05 NOTE — PROGRESS NOTES
HISTORY OF PRESENT ILLNESS Chapin Epps. is a 47 y.o. male. Mr. Farhat St is here for his 3 month checkup. He continues to have significant back pain, 8/10 without medication. He goes to a chiropractor and he feels like it helps a little bit. Pain is midline, going down R leg with tingling in the R thigh at times. No bladder or bowel issues. He has been sleeping with a new neck pillow for a couple of months and he feels like that has helped his neck pain out a lot. His workmen's comp people in [facility] continue to say that he doesn't need any medication and they are trying to discontinue his meds. He says he's willing to try anything to get off of them. Review of Systems Constitutional: Negative for fever. Eyes: Negative for blurred vision. Respiratory: Negative for cough. Cardiovascular: Negative for chest pain. Gastrointestinal: Negative for heartburn, nausea and vomiting. Genitourinary: Negative for dysuria, frequency and urgency. Musculoskeletal: Positive for back pain, myalgias and neck pain. Negative for falls and joint pain. Neurological: Positive for tingling. Negative for dizziness, sensory change, focal weakness and headaches. Psychiatric/Behavioral: Negative for depression. Physical Exam  
Constitutional: He is oriented to person, place, and time. He appears well-developed and well-nourished. Eyes: Pupils are equal, round, and reactive to light. Neck: Neck supple. No thyromegaly present. Cardiovascular: Normal rate, regular rhythm and normal heart sounds. Pulmonary/Chest: Effort normal and breath sounds normal. No respiratory distress. He has no wheezes. He has no rales. Abdominal: Soft. He exhibits no distension. Musculoskeletal: He exhibits tenderness. Lumbar spine tender to palpation in mid-lumbar area. Decreased ROM. Lymphadenopathy:  
  He has no cervical adenopathy. Neurological: He is alert and oriented to person, place, and time. Coordination normal.  
Psychiatric: He has a normal mood and affect. His behavior is normal.  
Nursing note and vitals reviewed. ASSESSMENT and PLAN 
  ICD-10-CM ICD-9-CM 1. Chronic midline low back pain without sciatica M54.5 724.2 acetaminophen-codeine (TYLENOL #3) 300-30 mg per tablet G89.29 338.29 DULoxetine (CYMBALTA) 30 mg capsule DULoxetine (CYMBALTA) 60 mg capsule 2. Degenerative disc disease, cervical M50.30 722.4 acetaminophen-codeine (TYLENOL #3) 300-30 mg per tablet DULoxetine (CYMBALTA) 30 mg capsule DULoxetine (CYMBALTA) 60 mg capsule 3. Encounter for chronic pain management G89.29 V65.49 acetaminophen-codeine (TYLENOL #3) 300-30 mg per tablet DULoxetine (CYMBALTA) 30 mg capsule 4. Insomnia, unspecified type G47.00 780.52 zolpidem (AMBIEN) 5 mg tablet He doesn't recall if he's ever been on Cymbalta in the past. I will try him on this and perhaps we can lessen the amount of Tylenol #3 that he takes.

## 2018-11-12 ENCOUNTER — TELEPHONE (OUTPATIENT)
Dept: FAMILY MEDICINE CLINIC | Age: 53
End: 2018-11-12

## 2018-11-12 NOTE — TELEPHONE ENCOUNTER
Called patient, he stated already talking to [facility] [place] [facility] and giving her the info we needed just had to call a number for his PA.

## 2018-11-12 NOTE — TELEPHONE ENCOUNTER
Pt calling again with update on prior auth info. He is giving a new fax number to where information can be faxed to. Its [phone].

## 2018-12-03 ENCOUNTER — OFFICE VISIT (OUTPATIENT)
Dept: ORTHOPEDIC SURGERY | Age: 53
End: 2018-12-03

## 2018-12-03 VITALS
TEMPERATURE: 98 F | HEART RATE: 78 BPM | OXYGEN SATURATION: 97 % | WEIGHT: 265.4 LBS | BODY MASS INDEX: 35.95 KG/M2 | DIASTOLIC BLOOD PRESSURE: 84 MMHG | SYSTOLIC BLOOD PRESSURE: 127 MMHG | RESPIRATION RATE: 17 BRPM | HEIGHT: 72 IN

## 2018-12-03 DIAGNOSIS — M54.12 CERVICAL RADICULAR PAIN: ICD-10-CM

## 2018-12-03 DIAGNOSIS — M54.2 NECK PAIN: ICD-10-CM

## 2018-12-03 DIAGNOSIS — M51.36 ANNULAR TEAR OF LUMBAR DISC: Primary | ICD-10-CM

## 2018-12-03 RX ORDER — GABAPENTIN 300 MG/1
CAPSULE ORAL
Qty: 90 CAP | Refills: 1 | Status: SHIPPED | OUTPATIENT
Start: 2018-12-03 | End: 2019-02-04 | Stop reason: SDUPTHER

## 2018-12-03 RX ORDER — METHYLPREDNISOLONE 4 MG/1
TABLET ORAL
Qty: 1 DOSE PACK | Refills: 0 | Status: SHIPPED | OUTPATIENT
Start: 2018-12-03 | End: 2019-02-04 | Stop reason: ALTCHOICE

## 2018-12-03 NOTE — PATIENT INSTRUCTIONS
Neck: Exercises  Your Care Instructions  Here are some examples of typical rehabilitation exercises for your condition. Start each exercise slowly. Ease off the exercise if you start to have pain. Your doctor or physical therapist will tell you when you can start these exercises and which ones will work best for you. How to do the exercises  Neck stretch    1. This stretch works best if you keep your shoulder down as you lean away from it. To help you remember to do this, start by relaxing your shoulders and lightly holding on to your thighs or your chair. 2. Tilt your head toward your shoulder and hold for 15 to 30 seconds. Let the weight of your head stretch your muscles. 3. If you would like a little added stretch, use your hand to gently and steadily pull your head toward your shoulder. For example, keeping your right shoulder down, lean your head to the left. 4. Repeat 2 to 4 times toward each shoulder. Diagonal neck stretch    1. Turn your head slightly toward the direction you will be stretching, and tilt your head diagonally toward your chest and hold for 15 to 30 seconds. 2. If you would like a little added stretch, use your hand to gently and steadily pull your head forward on the diagonal.  3. Repeat 2 to 4 times toward each side. Dorsal glide stretch    1. Sit or stand tall and look straight ahead. 2. Slowly tuck your chin as you glide your head backward over your body  3. Hold for a count of 6, and then relax for up to 10 seconds. 4. Repeat 8 to 12 times. Chest and shoulder stretch    1. Sit or stand tall and glide your head backward as in the dorsal glide stretch. 2. Raise both arms so that your hands are next to your ears. 3. Take a deep breath, and as you breathe out, lower your elbows down and behind your back. You will feel your shoulder blades slide down and together, and at the same time you will feel a stretch across your chest and the front of your shoulders.   4. Hold for about 6 seconds, and then relax for up to 10 seconds. 5. Repeat 8 to 12 times. Strengthening: Hands on head    1. Move your head backward, forward, and side to side against gentle pressure from your hands, holding each position for about 6 seconds. 2. Repeat 8 to 12 times. Follow-up care is a key part of your treatment and safety. Be sure to make and go to all appointments, and call your doctor if you are having problems. It's also a good idea to know your test results and keep a list of the medicines you take. Where can you learn more? Go to http://josh-elicia.info/. Enter P975 in the search box to learn more about \"Neck: Exercises. \"  Current as of: November 29, 2017  Content Version: 11.8  © 0056-8763 [employer]wise, [employer]. Care instructions adapted under license by [employer] (which disclaims liability or warranty for this information). If you have questions about a medical condition or this instruction, always ask your healthcare professional. [address] any warranty or liability for your use of this information.
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
## 2019-01-08 ENCOUNTER — TELEPHONE (OUTPATIENT)
Dept: ORTHOPEDIC SURGERY | Age: 54
End: 2019-01-08

## 2019-01-08 ENCOUNTER — DOCUMENTATION ONLY (OUTPATIENT)
Dept: ORTHOPEDIC SURGERY | Age: 54
End: 2019-01-08

## 2019-01-08 NOTE — TELEPHONE ENCOUNTER
The patient was called today to Reschedule his 1/14/19 appt with IRISH Montejo. He states he was suppose to be contacted about dry needling before coming back. When he was contacted by our office they had not gotten in touch with his [id number] [address] first. They told him the would call him back but he has never heard from anyone. He wants someone to call him at [phone] about dryneedling.

## 2019-01-08 NOTE — PROGRESS NOTES
Received authorization for Physical Therapy from W/C for only 10 visits per calendar year based on [facility] Compensation Maintenance Guidelines. I called patient to inform him and he will hold off for now and decide when he wants to start PT. He may come and get HEP & exercises to do at home.

## 2019-01-08 NOTE — TELEPHONE ENCOUNTER
Called and got the patient's new W/C info and faxed it to [facility], [address] [place] [address] , requesting Jerri Gallardo for PT, patient aware, PENDING

## 2019-02-04 ENCOUNTER — OFFICE VISIT (OUTPATIENT)
Dept: FAMILY MEDICINE CLINIC | Age: 54
End: 2019-02-04

## 2019-02-04 ENCOUNTER — HOSPITAL ENCOUNTER (OUTPATIENT)
Dept: LAB | Age: 54
Discharge: HOME | End: 2019-02-04
Payer: MEDICARE

## 2019-02-04 VITALS
TEMPERATURE: 98.4 F | WEIGHT: 273.2 LBS | RESPIRATION RATE: 12 BRPM | DIASTOLIC BLOOD PRESSURE: 94 MMHG | OXYGEN SATURATION: 97 % | BODY MASS INDEX: 37 KG/M2 | HEART RATE: 77 BPM | SYSTOLIC BLOOD PRESSURE: 134 MMHG | HEIGHT: 72 IN

## 2019-02-04 DIAGNOSIS — M54.50 CHRONIC MIDLINE LOW BACK PAIN WITHOUT SCIATICA: Primary | ICD-10-CM

## 2019-02-04 DIAGNOSIS — E78.5 HYPERLIPIDEMIA, UNSPECIFIED HYPERLIPIDEMIA TYPE: ICD-10-CM

## 2019-02-04 DIAGNOSIS — E79.0 HYPERURICEMIA: ICD-10-CM

## 2019-02-04 DIAGNOSIS — G47.00 INSOMNIA, UNSPECIFIED TYPE: ICD-10-CM

## 2019-02-04 DIAGNOSIS — G89.29 ENCOUNTER FOR CHRONIC PAIN MANAGEMENT: ICD-10-CM

## 2019-02-04 DIAGNOSIS — M50.30 DEGENERATIVE DISC DISEASE, CERVICAL: ICD-10-CM

## 2019-02-04 DIAGNOSIS — G89.29 CHRONIC MIDLINE LOW BACK PAIN WITHOUT SCIATICA: Primary | ICD-10-CM

## 2019-02-04 DIAGNOSIS — Z79.899 HIGH RISK MEDICATION USE: ICD-10-CM

## 2019-02-04 LAB
ALBUMIN SERPL-MCNC: 4 G/DL (ref 3.4–5)
ALBUMIN/GLOB SERPL: 1.3 {RATIO} (ref 0.8–1.7)
ALP SERPL-CCNC: 72 U/L (ref 45–117)
ALT SERPL-CCNC: 33 U/L (ref 16–61)
ANION GAP SERPL CALC-SCNC: 7 MMOL/L (ref 3–18)
AST SERPL-CCNC: 22 U/L (ref 15–37)
BILIRUB SERPL-MCNC: 0.4 MG/DL (ref 0.2–1)
BUN SERPL-MCNC: 19 MG/DL (ref 7–18)
BUN/CREAT SERPL: 14 (ref 12–20)
CALCIUM SERPL-MCNC: 8.8 MG/DL (ref 8.5–10.1)
CHLORIDE SERPL-SCNC: 105 MMOL/L (ref 100–108)
CHOLEST SERPL-MCNC: 171 MG/DL
CO2 SERPL-SCNC: 26 MMOL/L (ref 21–32)
CREAT SERPL-MCNC: 1.37 MG/DL (ref 0.6–1.3)
GLOBULIN SER CALC-MCNC: 3.2 G/DL (ref 2–4)
GLUCOSE SERPL-MCNC: 122 MG/DL (ref 74–99)
HDLC SERPL-MCNC: 23 MG/DL (ref 40–60)
HDLC SERPL: 7.4 {RATIO} (ref 0–5)
LDLC SERPL CALC-MCNC: 116 MG/DL (ref 0–100)
LIPID PROFILE,FLP: ABNORMAL
POTASSIUM SERPL-SCNC: 4.3 MMOL/L (ref 3.5–5.5)
PROT SERPL-MCNC: 7.2 G/DL (ref 6.4–8.2)
SODIUM SERPL-SCNC: 138 MMOL/L (ref 136–145)
TRIGL SERPL-MCNC: 160 MG/DL (ref ?–150)
URATE SERPL-MCNC: 6.1 MG/DL (ref 2.6–7.2)
VLDLC SERPL CALC-MCNC: 32 MG/DL

## 2019-02-04 PROCEDURE — 80061 LIPID PANEL: CPT

## 2019-02-04 PROCEDURE — 80307 DRUG TEST PRSMV CHEM ANLYZR: CPT

## 2019-02-04 PROCEDURE — 80053 COMPREHEN METABOLIC PANEL: CPT

## 2019-02-04 PROCEDURE — 84550 ASSAY OF BLOOD/URIC ACID: CPT

## 2019-02-04 RX ORDER — ZOLPIDEM TARTRATE 5 MG/1
TABLET ORAL
Qty: 30 TAB | Refills: 2 | Status: SHIPPED | OUTPATIENT
Start: 2019-02-04 | End: 2019-05-02 | Stop reason: SDUPTHER

## 2019-02-04 RX ORDER — BUDESONIDE AND FORMOTEROL FUMARATE DIHYDRATE 80; 4.5 UG/1; UG/1
2 AEROSOL RESPIRATORY (INHALATION) 2 TIMES DAILY
Qty: 1 INHALER | Refills: 5 | Status: SHIPPED | OUTPATIENT
Start: 2019-02-04 | End: 2019-08-12 | Stop reason: SDUPTHER

## 2019-02-04 RX ORDER — FENOFIBRATE 145 MG/1
TABLET, COATED ORAL
Qty: 90 TAB | Refills: 0 | Status: SHIPPED | OUTPATIENT
Start: 2019-02-04 | End: 2019-05-13 | Stop reason: SDUPTHER

## 2019-02-04 RX ORDER — BUDESONIDE AND FORMOTEROL FUMARATE DIHYDRATE 80; 4.5 UG/1; UG/1
2 AEROSOL RESPIRATORY (INHALATION) 2 TIMES DAILY
COMMUNITY
End: 2019-02-04 | Stop reason: SDUPTHER

## 2019-02-04 RX ORDER — ACETAMINOPHEN AND CODEINE PHOSPHATE 300; 30 MG/1; MG/1
TABLET ORAL
Qty: 90 TAB | Refills: 0 | Status: SHIPPED | OUTPATIENT
Start: 2019-02-04 | End: 2019-03-11 | Stop reason: SDUPTHER

## 2019-02-04 NOTE — PROGRESS NOTES
Rm 1 
Patient presents to the clinic for a 3 month follow-up of back and neck pain. Patient also needing medication refills. Requested Prescriptions Pending Prescriptions Disp Refills  acetaminophen-codeine (TYLENOL #3) 300-30 mg per tablet 90 Tab 0 Sig: TAKE 1 TABLET BY MOUTH THREE TIMES DAILY AS NEEDED FOR PAIN MAX DAILY AMOUNT 3 TABLETS  zolpidem (AMBIEN) 5 mg tablet 30 Tab 2 Sig: TAKE ONE TABLET BY MOUTH NIGHTLY AS NEEDED FOR SLEEP. MAXIMUM DAILY AMOUNT: 5 MG  
 budesonide-formoterol (SYMBICORT) 80-4.5 mcg/actuation HFAA Sig: Take 2 Puffs by inhalation two (2) times a day.  fenofibrate nanocrystallized (TRICOR) 145 mg tablet 90 Tab 0 Depression Screening: PHQ over the last two weeks 2/4/2019 5/8/2018 4/6/2018 11/6/2017 7/24/2017 7/18/2017 Little interest or pleasure in doing things Not at all Not at all Not at all Not at all Not at all Not at all Feeling down, depressed, irritable, or hopeless Not at all Not at all Not at all Not at all Not at all Not at all Total Score PHQ 2 0 0 0 0 0 0 Learning Assessment: 
Learning Assessment 7/18/2017 PRIMARY LEARNER Patient HIGHEST LEVEL OF EDUCATION - PRIMARY LEARNER  > 4 YEARS OF COLLEGE  
BARRIERS PRIMARY LEARNER NONE  
CO-LEARNER CAREGIVER No  
PRIMARY LANGUAGE ENGLISH  
LEARNER PREFERENCE PRIMARY DEMONSTRATION  
ANSWERED BY patient RELATIONSHIP SELF Abuse Screening: No flowsheet data found. Health Maintenance reviewed and discussed per provider: yes Coordination of Care: 1. Have you been to the ER, urgent care clinic since your last visit? Hospitalized since your last visit? no 
 
2. Have you seen or consulted any other health care providers outside of the [address] [place] [address] since your last visit? Include any pap smears or colon screening. No 
 
Does patient want flu shot?  No

## 2019-02-04 NOTE — PROGRESS NOTES
HISTORY OF PRESENT ILLNESS David Waldron is a 46 y.o. male. David Rodas. RTC today to follow up on chronic pain diagnosis. We discussed his degenerative disc disease in neck and lower back, shoulder pain. Significant changes since last visit: none. He is  able to do his normal daily activities. He reports the following adverse side effects: Cymbalta made him feel strange. He continues to see Dr. Rajani arroyo but his W/C carrier just changed so he has to get approval. He tries to only use the Tylenol #3 when absolutely needed, sometimes he can get away with 1-2 per day. He has also been using CBD oil and it helps him at night. He also needs Tricor refilled and is due for labs Least pain over the last week has been 5/10. Worst pain over the last week has been 10/10. Aberrant behaviors: None. Urine Drug Screen: due - order placed. Controlled substance agreement on file: YES.  reviewed:yes Pill count is consistent with his prescription: yes and n/a Concomitant use of a benzodiazepine: no 
 
 
Also,  abstinence syndrome was reviewed and discussed with her today N/A Review of Systems Constitutional: Negative for chills and fever. Eyes: Negative for blurred vision and double vision. Respiratory: Negative for cough and shortness of breath. Cardiovascular: Negative for chest pain and palpitations. Gastrointestinal: Negative for abdominal pain, nausea and vomiting. Musculoskeletal: Positive for back pain, joint pain (shoulders), myalgias and neck pain. Neurological: Negative for headaches. Psychiatric/Behavioral: Negative for depression and suicidal ideas. The patient has insomnia. Physical Exam  
Constitutional: He is oriented to person, place, and time. He appears well-developed and well-nourished. Looks uncomfortable getting up from chair Eyes: Pupils are equal, round, and reactive to light. Neck: Neck supple. No thyromegaly present. Cardiovascular: Normal rate, regular rhythm and normal heart sounds. Pulmonary/Chest: Effort normal and breath sounds normal. No respiratory distress. He has no wheezes. He has no rales. Abdominal: Soft. He exhibits no distension. There is no tenderness. Musculoskeletal:  
Tender cervical spine and lumbar spine. Decreased ROM both areas. Lymphadenopathy:  
  He has no cervical adenopathy. Neurological: He is alert and oriented to person, place, and time. Psychiatric: He has a normal mood and affect. His behavior is normal.  
Nursing note and vitals reviewed. ASSESSMENT and PLAN 
  ICD-10-CM ICD-9-CM 1. Chronic midline low back pain without sciatica M54.5 724.2 acetaminophen-codeine (TYLENOL #3) 300-30 mg per tablet G89.29 338.29   
2. Degenerative disc disease, cervical M50.30 722.4 acetaminophen-codeine (TYLENOL #3) 300-30 mg per tablet 3. Encounter for chronic pain management G89.29 V65.49 acetaminophen-codeine (TYLENOL #3) 300-30 mg per tablet COMPLIANCE DRUG SCREEN/PRESCRIPTION MONITORING  
4. Insomnia, unspecified type G47.00 780.52 zolpidem (AMBIEN) 5 mg tablet METABOLIC PANEL, COMPREHENSIVE 5. Hyperlipidemia, unspecified hyperlipidemia type E78.5 272.4 LIPID PANEL 6. Hyperuricemia E79.0 790.6 URIC ACID 7.  High risk medication use W35.324 I92.57 METABOLIC PANEL, COMPREHENSIVE

## 2019-02-05 ENCOUNTER — OFFICE VISIT (OUTPATIENT)
Dept: ORTHOPEDIC SURGERY | Age: 54
End: 2019-02-05

## 2019-02-05 VITALS
DIASTOLIC BLOOD PRESSURE: 91 MMHG | SYSTOLIC BLOOD PRESSURE: 147 MMHG | WEIGHT: 272 LBS | HEART RATE: 84 BPM | BODY MASS INDEX: 36.84 KG/M2 | HEIGHT: 72 IN

## 2019-02-05 DIAGNOSIS — M79.18 MYOFASCIAL PAIN: ICD-10-CM

## 2019-02-05 DIAGNOSIS — G89.4 CHRONIC PAIN SYNDROME: ICD-10-CM

## 2019-02-05 DIAGNOSIS — R20.2 NUMBNESS AND TINGLING IN LEFT ARM: ICD-10-CM

## 2019-02-05 DIAGNOSIS — M54.50 LUMBAR SPINE PAIN: ICD-10-CM

## 2019-02-05 DIAGNOSIS — M54.2 NECK PAIN: Primary | ICD-10-CM

## 2019-02-05 DIAGNOSIS — R20.0 NUMBNESS AND TINGLING IN LEFT ARM: ICD-10-CM

## 2019-02-05 RX ORDER — GABAPENTIN 300 MG/1
600 CAPSULE ORAL 3 TIMES DAILY
Qty: 180 CAP | Refills: 2 | Status: SHIPPED | OUTPATIENT
Start: 2019-02-05 | End: 2019-03-11 | Stop reason: SDUPTHER

## 2019-02-05 NOTE — PROGRESS NOTES
Advise MrNelly Cantu that his triglycerides look better but his glucose is high and his cholesterol is up slightly.  Since he can't really exercise, he needs to work on lowering his carbs, smaller portions of meals, and when he is due for his next refill he needs to schedule and appointment and we will check a A1C for diabetes (he can do it sooner if he wants)
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
## 2019-02-08 LAB — DRUGS UR: NORMAL

## 2019-02-13 ENCOUNTER — DOCUMENTATION ONLY (OUTPATIENT)
Dept: ORTHOPEDIC SURGERY | Age: 54
End: 2019-02-13

## 2019-02-13 NOTE — PROGRESS NOTES
Received auth from [facility] [id number] ([facility]) for MRI, sent order to [address] for them to set it up and call patient with date.

## 2019-02-19 ENCOUNTER — TELEPHONE (OUTPATIENT)
Dept: ORTHOPEDIC SURGERY | Age: 54
End: 2019-02-19

## 2019-02-19 DIAGNOSIS — F40.240 CLAUSTROPHOBIA: Primary | ICD-10-CM

## 2019-02-19 NOTE — TELEPHONE ENCOUNTER
Patient called in states that he has an MRI on 02/25/19 and needs medication to calm him down as he is very claustrophobic. Patient uses  [facility] [id number] [facility], [address] and can be reached at [phone].

## 2019-02-19 NOTE — TELEPHONE ENCOUNTER
VO for Valium 5 mg PO 30 mins prior to MRI. May repeat x one at the time of MRI if needed. Must have . So dispense valium 5 mg PO, #2, 0 RF.

## 2019-02-20 RX ORDER — DIAZEPAM 5 MG/1
TABLET ORAL
Qty: 2 TAB | Refills: 0 | OUTPATIENT
Start: 2019-02-20 | End: 2019-03-11 | Stop reason: ALTCHOICE

## 2019-02-20 NOTE — TELEPHONE ENCOUNTER
Patient called as he needs to know if the tranqualizer to take before the MRI Monday has been called to [facility].  Please call him back today at [phone]

## 2019-03-11 ENCOUNTER — OFFICE VISIT (OUTPATIENT)
Dept: FAMILY MEDICINE CLINIC | Age: 54
End: 2019-03-11

## 2019-03-11 ENCOUNTER — OFFICE VISIT (OUTPATIENT)
Dept: ORTHOPEDIC SURGERY | Age: 54
End: 2019-03-11

## 2019-03-11 VITALS
HEIGHT: 72 IN | DIASTOLIC BLOOD PRESSURE: 90 MMHG | RESPIRATION RATE: 18 BRPM | SYSTOLIC BLOOD PRESSURE: 126 MMHG | WEIGHT: 269.4 LBS | HEART RATE: 69 BPM | BODY MASS INDEX: 36.49 KG/M2 | TEMPERATURE: 98.2 F | OXYGEN SATURATION: 98 %

## 2019-03-11 VITALS
BODY MASS INDEX: 36.3 KG/M2 | RESPIRATION RATE: 16 BRPM | WEIGHT: 268 LBS | HEART RATE: 59 BPM | TEMPERATURE: 98 F | HEIGHT: 72 IN | OXYGEN SATURATION: 98 % | SYSTOLIC BLOOD PRESSURE: 122 MMHG | DIASTOLIC BLOOD PRESSURE: 82 MMHG

## 2019-03-11 DIAGNOSIS — R20.0 NUMBNESS AND TINGLING IN LEFT ARM: ICD-10-CM

## 2019-03-11 DIAGNOSIS — M79.18 MYOFASCIAL PAIN: ICD-10-CM

## 2019-03-11 DIAGNOSIS — M54.50 CHRONIC MIDLINE LOW BACK PAIN WITHOUT SCIATICA: ICD-10-CM

## 2019-03-11 DIAGNOSIS — M50.30 DEGENERATIVE DISC DISEASE, CERVICAL: ICD-10-CM

## 2019-03-11 DIAGNOSIS — M54.2 NECK PAIN: ICD-10-CM

## 2019-03-11 DIAGNOSIS — R73.01 IMPAIRED FASTING GLUCOSE: ICD-10-CM

## 2019-03-11 DIAGNOSIS — M54.12 CERVICAL RADICULOPATHY: Primary | ICD-10-CM

## 2019-03-11 DIAGNOSIS — R20.2 NUMBNESS AND TINGLING IN LEFT ARM: ICD-10-CM

## 2019-03-11 DIAGNOSIS — G89.29 CHRONIC MIDLINE LOW BACK PAIN WITHOUT SCIATICA: ICD-10-CM

## 2019-03-11 DIAGNOSIS — M54.50 LUMBAR SPINE PAIN: ICD-10-CM

## 2019-03-11 DIAGNOSIS — G89.4 CHRONIC PAIN SYNDROME: ICD-10-CM

## 2019-03-11 DIAGNOSIS — R03.0 ELEVATED BP WITHOUT DIAGNOSIS OF HYPERTENSION: Primary | ICD-10-CM

## 2019-03-11 DIAGNOSIS — G89.29 ENCOUNTER FOR CHRONIC PAIN MANAGEMENT: ICD-10-CM

## 2019-03-11 LAB — HBA1C MFR BLD HPLC: 5.2 %

## 2019-03-11 RX ORDER — NALOXONE HYDROCHLORIDE 4 MG/.1ML
SPRAY NASAL
Qty: 2 EACH | Refills: 0 | Status: SHIPPED | OUTPATIENT
Start: 2019-03-11

## 2019-03-11 RX ORDER — ACETAMINOPHEN AND CODEINE PHOSPHATE 300; 30 MG/1; MG/1
1 TABLET ORAL
Qty: 90 TAB | Refills: 0 | Status: SHIPPED | OUTPATIENT
Start: 2019-03-11 | End: 2019-04-10

## 2019-03-11 NOTE — PROGRESS NOTES
HISTORY OF PRESENT ILLNESS  Clayton Manrique. is a 46 y.o. male. HPI  Mr. Kelvin Calderon presents for elevated BP. He reports that at multiple specialty visits and with home BP checks (arm cuff), his BP has been consistently in the 140's/90's. No prior hx of HTN or BP medication. He denies smoking. He drinks 1 caffeinated coffee per day. He admits that up to 5 days ago, he consumed ~39 alcoholic drinks per day. He admits he was drinking to excess and attributes this to poor sleeping. He states he has abstained for the past 5 days and plans to continue. Recent labs reviewed - impaired fasting glucose. Advised of A1C. Review of Systems   Eyes: Negative for blurred vision. Respiratory: Positive for shortness of breath (with asthma per patient report). Cardiovascular: Negative for chest pain and leg swelling. Neurological: Positive for dizziness (chronic vertigo since work injury in 2002 - unchanged per patient) and headaches (unchanged from previous). Visit Vitals  /82 (BP 1 Location: Left arm, BP Patient Position: Sitting)   Pulse (!) 59   Temp 98 °F (36.7 °C) (Oral)   Resp 16   Ht 6' (1.829 m)   Wt 268 lb (121.6 kg)   SpO2 98%   BMI 36.35 kg/m²     BP recheck 118/86 right manual.     Physical Exam   Constitutional: He is oriented to person, place, and time. He appears well-developed and well-nourished. No distress. HENT:   Head: Normocephalic and atraumatic. Right Ear: Tympanic membrane, external ear and ear canal normal.   Left Ear: Tympanic membrane, external ear and ear canal normal.   Nose: Nose normal.   Mouth/Throat: Uvula is midline, oropharynx is clear and moist and mucous membranes are normal. No oropharyngeal exudate, posterior oropharyngeal edema, posterior oropharyngeal erythema or tonsillar abscesses. Eyes: Conjunctivae are normal. Pupils are equal, round, and reactive to light. No scleral icterus. Neck: Neck supple.    Cardiovascular: Normal rate, regular rhythm and normal heart sounds. Exam reveals no gallop. No murmur heard. Pulses:       Dorsalis pedis pulses are 2+ on the right side, and 2+ on the left side. Posterior tibial pulses are 2+ on the right side, and 2+ on the left side. No pedal edema. Pulmonary/Chest: Effort normal and breath sounds normal. No respiratory distress. He has no decreased breath sounds. He has no wheezes. He has no rhonchi. He has no rales. Lymphadenopathy:        Head (right side): No submandibular and no tonsillar adenopathy present. Head (left side): No submandibular and no tonsillar adenopathy present. He has no cervical adenopathy. Right: No supraclavicular adenopathy present. Left: No supraclavicular adenopathy present. Neurological: He is alert and oriented to person, place, and time. Skin: Skin is warm and dry. Psychiatric: He has a normal mood and affect. His speech is normal.     Results for orders placed or performed in visit on 03/11/19   AMB POC HEMOGLOBIN A1C   Result Value Ref Range    Hemoglobin A1c (POC) 5.2 %       ASSESSMENT and PLAN  Orders Placed This Encounter    AMB POC HEMOGLOBIN A1C    acetaminophen-codeine (TYLENOL #3) 300-30 mg per tablet     Sig: Take 1 Tab by mouth three (3) times daily as needed for Pain for up to 30 days. Max Daily Amount: 3 Tabs. Dispense:  90 Tab     Refill:  0    naloxone (NARCAN) 4 mg/actuation nasal spray     Sig: Use 1 spray intranasally, then discard. Repeat with new spray every 2 min as needed for opioid overdose symptoms, alternating nostrils. Dispense:  2 Each     Refill:  0     Diagnoses and all orders for this visit:    1. Elevated BP without diagnosis of hypertension  - I suspect BP has improved with alcohol abstinence. Advised he continue with his efforts and we will continue to monitor. Encouragement given. 2. Impaired fasting glucose  -     AMB POC HEMOGLOBIN A1C    3. Chronic midline low back pain without sciatica  4.  Degenerative disc disease, cervical  5. Encounter for chronic pain management  -     acetaminophen-codeine (TYLENOL #3) 300-30 mg per tablet; Take 1 Tab by mouth three (3) times daily as needed for Pain for up to 30 days. Max Daily Amount: 3 Tabs. Other orders  -     naloxone (NARCAN) 4 mg/actuation nasal spray; Use 1 spray intranasally, then discard. Repeat with new spray every 2 min as needed for opioid overdose symptoms, alternating nostrils. - Patient did not previously fill this. Advised to fill and keep available. Follow-up Disposition:  Return in about 2 months (around 5/11/2019).    Routine pain management visit - workman's comp
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
## 2019-03-11 NOTE — PROGRESS NOTES
Patient presents to the clinic for a blood pressure check. Patient states his bp has been elevated since his last visit.

## 2019-04-16 ENCOUNTER — TELEPHONE (OUTPATIENT)
Dept: INTERNAL MEDICINE CLINIC | Age: 54
End: 2019-04-16

## 2019-04-16 NOTE — TELEPHONE ENCOUNTER
Patient is calling in to get a refill on his gout medication. States he is having a flare up.   Next scheduled reji 5/6/19

## 2019-04-30 DIAGNOSIS — G47.00 INSOMNIA, UNSPECIFIED TYPE: ICD-10-CM

## 2019-04-30 NOTE — TELEPHONE ENCOUNTER
Patient request, last OV 3/11/19, no future appt scheduled. Requested Prescriptions     Pending Prescriptions Disp Refills    zolpidem (AMBIEN) 5 mg tablet 30 Tab 2     Sig: TAKE ONE TABLET BY MOUTH NIGHTLY AS NEEDED FOR SLEEP.  MAXIMUM DAILY AMOUNT: 5 MG

## 2019-04-30 NOTE — TELEPHONE ENCOUNTER
PCP: Cole Castillo MD    Last appt: 03/11/2019  Future Appointments   Date Time Provider Matheus Hernandezi   5/6/2019  7:30 AM SERA Pettit [facility] SCHED   8/12/2019  9:00 AM [facility] POD1 NURSE [facility] SCHED   8/21/2019  8:45 AM Donis Hughes MD [facility] GE SCHED       Requested Prescriptions     Pending Prescriptions Disp Refills    zolpidem (AMBIEN) 5 mg tablet 30 Tab 2     Sig: TAKE ONE TABLET BY MOUTH NIGHTLY AS NEEDED FOR SLEEP. MAXIMUM DAILY AMOUNT: 5 MG       Request for a 30 or 90 day supply? Provider Discretion    Pharmacy: [address] [place] [address]    Other Comments:   printed and placed in PCP medication refill review folder.      Last UDS date: 2/4/2019  Pain contract signed: 02/06/2018

## 2019-05-01 RX ORDER — ZOLPIDEM TARTRATE 5 MG/1
TABLET ORAL
Qty: 30 TAB | Refills: 2 | OUTPATIENT
Start: 2019-05-01

## 2019-05-02 RX ORDER — ZOLPIDEM TARTRATE 5 MG/1
TABLET ORAL
Qty: 30 TAB | Refills: 0 | Status: SHIPPED | OUTPATIENT
Start: 2019-05-02 | End: 2019-05-13 | Stop reason: ALTCHOICE

## 2019-05-02 NOTE — TELEPHONE ENCOUNTER
Checked  and no aberrancies seen. Printed rx for:    Requested Prescriptions     Signed Prescriptions Disp Refills    zolpidem (AMBIEN) 5 mg tablet 30 Tab 0     Sig: TAKE ONE TABLET BY MOUTH NIGHTLY AS NEEDED FOR SLEEP. MAXIMUM DAILY AMOUNT: 5 MG     Authorizing Provider: Negrito Black       Please let pt know that this is ready for .

## 2019-05-02 NOTE — TELEPHONE ENCOUNTER
Patient contacted and informed that he will need an appointment before this medication can be refilled. Patient states he has a follow up appointment already scheduled for 05/06/2019 but he is upset because he will be out of his medication tomorrow 05/03/2019. Patient states this medication is very important as a maintenance medication and he cannot be without. Patient states he will still keep his appointment for Monday 05/06/2019 but he must have this medication refilled no later than 05/03/2019. Please advise.

## 2019-05-13 ENCOUNTER — OFFICE VISIT (OUTPATIENT)
Dept: INTERNAL MEDICINE CLINIC | Age: 54
End: 2019-05-13

## 2019-05-13 VITALS
OXYGEN SATURATION: 94 % | SYSTOLIC BLOOD PRESSURE: 155 MMHG | HEIGHT: 72 IN | WEIGHT: 266 LBS | TEMPERATURE: 98.2 F | DIASTOLIC BLOOD PRESSURE: 92 MMHG | RESPIRATION RATE: 18 BRPM | BODY MASS INDEX: 36.03 KG/M2 | HEART RATE: 67 BPM

## 2019-05-13 DIAGNOSIS — T16.2XXA FOREIGN BODY OF LEFT EAR, INITIAL ENCOUNTER: ICD-10-CM

## 2019-05-13 DIAGNOSIS — G47.00 INSOMNIA, UNSPECIFIED TYPE: ICD-10-CM

## 2019-05-13 DIAGNOSIS — I10 ESSENTIAL HYPERTENSION: ICD-10-CM

## 2019-05-13 DIAGNOSIS — E78.5 HYPERLIPIDEMIA, UNSPECIFIED HYPERLIPIDEMIA TYPE: ICD-10-CM

## 2019-05-13 DIAGNOSIS — M54.50 LUMBAR SPINE PAIN: ICD-10-CM

## 2019-05-13 DIAGNOSIS — M54.12 CERVICAL RADICULOPATHY: Primary | ICD-10-CM

## 2019-05-13 RX ORDER — FENOFIBRATE 145 MG/1
TABLET, COATED ORAL
Qty: 90 TAB | Refills: 0 | Status: SHIPPED | OUTPATIENT
Start: 2019-05-13 | End: 2020-05-06 | Stop reason: ALTCHOICE

## 2019-05-13 RX ORDER — ESZOPICLONE 3 MG/1
3 TABLET, FILM COATED ORAL
Qty: 30 TAB | Refills: 2 | Status: SHIPPED | OUTPATIENT
Start: 2019-05-13 | End: 2019-08-05 | Stop reason: SDUPTHER

## 2019-05-13 RX ORDER — ACETAMINOPHEN AND CODEINE PHOSPHATE 300; 30 MG/1; MG/1
1 TABLET ORAL
Qty: 90 TAB | Refills: 0 | Status: SHIPPED | OUTPATIENT
Start: 2019-05-13 | End: 2019-06-10 | Stop reason: SDUPTHER

## 2019-05-13 NOTE — PROGRESS NOTES
Rm: 12    Chief Complaint   Patient presents with    Hypertension    Pain (Chronic)     low back     Depression Screening:  3 most recent [facility] [place] Screens 5/13/2019 3/11/2019 3/11/2019 2/4/2019 5/8/2018 4/6/2018 11/6/2017   Little interest or pleasure in doing things Not at all Not at all Not at all Not at all Not at all Not at all Not at all   Feeling down, depressed, irritable, or hopeless Not at all Not at all Not at all Not at all Not at all Not at all Not at all   Total Score PHQ 2 0 0 0 0 0 0 0       Learning Assessment:  Learning Assessment 7/18/2017   PRIMARY LEARNER Patient   HIGHEST LEVEL OF EDUCATION - PRIMARY LEARNER  > 4 YEARS OF COLLEGE   BARRIERS PRIMARY LEARNER NONE   CO-LEARNER CAREGIVER No   PRIMARY LANGUAGE ENGLISH   LEARNER PREFERENCE PRIMARY DEMONSTRATION   ANSWERED BY patient   RELATIONSHIP SELF       Abuse Screening:  No flowsheet data found. Health Maintenance reviewed and discussed per provider: yes     Coordination of Care:    1. Have you been to the ER, urgent care clinic since your last visit? Hospitalized since your last visit? no    2. Have you seen or consulted any other health care providers outside of the [address] [place] [address] since your last visit? Include any pap smears or colon screening.  no

## 2019-05-13 NOTE — PROGRESS NOTES
HISTORY OF PRESENT ILLNESS  Zayra Villela. is a 46 y.o. male. HPI  Zayra Villela. RTC today to follow up on chronic pain diagnosis. We discussed his spondylosis and radiculopathy that is affecting his back and neck. Significant changes since last visit: increased pain. He is  able to do his normal daily activities. He reports the following adverse side effects: none. - Upcoming 2nd C-spine injection via pain management 19 (referred by Dr. Delbert Lynn). He reports the first injection helped for a few weeks. He is hoping for more extended benefits.  - Will complete series of 3 (monthly) then f/u with Dr. Delbert Lynn. Least pain over the last week has been 5/10. Worst pain over the last week has been 10/10. Opioid Risk Tool Reviewed: NO:     Aberrant behaviors: None. Urine Drug Screen: due - order placed. Controlled substance agreement on file: YES.  reviewed:yes    Pill count is consistent with his prescription: yes  - Reports #2 tabs remaining. Trying to avoid TID dosing but some days requires this. Concomitant use of a benzodiazepine: no    <50 MME/day    Narcan - he states workman's comp would not cover this medication. He chose to not fill this at his own expense. Also,  abstinence syndrome was reviewed and discussed with her today N/A     2) HTN - BP increased today. - Typically drinking 6-8 alcoholic drinks/day. He admits he resumed this since his last visit. He states he drinks in the evenings to help him sleep. He is agreeable to stopping EtOH again for BP. 3) Insomnia - takes Ambien 5 mg daily but c/o this not controlling insomnia. He oftentimes attributes insomnia to pain. Hx of better benefit with Lunesta. He is uncertain why this changed to Ambien. He is currently consuming evening EtOH along with taking Ambien. Review of Systems   Musculoskeletal: Positive for back pain and neck pain. With arm/leg radiation.      Visit Vitals  BP (!) 155/92 (BP 1 Location: Right arm, BP Patient Position: Sitting)   Pulse 67   Temp 98.2 °F (36.8 °C) (Oral)   Resp 18   Ht 6' (1.829 m)   Wt 266 lb (120.7 kg)   SpO2 94%   BMI 36.08 kg/m²       Physical Exam   Constitutional: He is oriented to person, place, and time. He appears well-developed and well-nourished. No distress. HENT:   Head: Normocephalic and atraumatic. Right Ear: Tympanic membrane, external ear and ear canal normal. No foreign bodies. Left Ear: Tympanic membrane and external ear normal. A foreign body (1 plastic hearing aid cover easily removed. 2nd one seen deeper in the canal - unable to safely remove. TM partially visualized behind FB - no seen abnormality.) is present. Nose: Nose normal.   Mouth/Throat: Uvula is midline, oropharynx is clear and moist and mucous membranes are normal. No oropharyngeal exudate, posterior oropharyngeal edema, posterior oropharyngeal erythema or tonsillar abscesses. Eyes: Pupils are equal, round, and reactive to light. Conjunctivae are normal. No scleral icterus. Neck: Neck supple. Cardiovascular: Normal rate, regular rhythm and normal heart sounds. Exam reveals no gallop. No murmur heard. Pulses:       Dorsalis pedis pulses are 2+ on the right side, and 2+ on the left side. Posterior tibial pulses are 2+ on the right side, and 2+ on the left side. No pedal edema. Pulmonary/Chest: Effort normal and breath sounds normal. No respiratory distress. He has no decreased breath sounds. He has no wheezes. He has no rhonchi. He has no rales. Lymphadenopathy:        Head (right side): No submandibular and no tonsillar adenopathy present. Head (left side): No submandibular and no tonsillar adenopathy present. He has no cervical adenopathy. Right: No supraclavicular adenopathy present. Left: No supraclavicular adenopathy present. Neurological: He is alert and oriented to person, place, and time.    Skin: Skin is warm and dry.   Psychiatric: He has a normal mood and affect. His speech is normal.       ASSESSMENT and PLAN  Diagnoses and all orders for this visit:    1. Cervical radiculopathy  2. Lumbar spine pain  -     acetaminophen-codeine (TYLENOL #3) 300-30 mg per tablet; Take 1 Tab by mouth three (3) times daily as needed for Pain for up to 30 days. Max Daily Amount: 3 Tabs. -     [address] (); Future    3. Insomnia, unspecified type  -     eszopiclone (LUNESTA) 3 mg tablet; Take 1 Tab by mouth nightly. Max Daily Amount: 3 mg.   - Discussed with patient he cannot combine EtOH with either Ambien or Lunesta. He agrees to d/c this combination. Safety concerns thoroughly discussed. We will change Ambien to [facility] with a goal of improved insomnia control. He will d/c Ambien. 4. Foreign body of left ear, initial encounter  -     REFERRAL TO ENT-OTOLARYNGOLOGY    5. Essential hypertension  - D/C EtOH. He desires to hold on medication. 6. Hyperlipidemia, unspecified hyperlipidemia type  -     fenofibrate nanocrystallized (TRICOR) 145 mg tablet; TAKE 1 TABLET BY MOUTH DAILY      Follow-up and Dispositions    · Return in about 1 month (around 6/13/2019) for HTN f/u (fasting lab same day). F/u insomnia at that time as well.

## 2019-05-17 LAB — DRUGS UR: NORMAL

## 2019-06-10 DIAGNOSIS — M54.12 CERVICAL RADICULOPATHY: ICD-10-CM

## 2019-06-10 DIAGNOSIS — M54.50 LUMBAR SPINE PAIN: ICD-10-CM

## 2019-06-10 RX ORDER — NALOXONE HYDROCHLORIDE 4 MG/.1ML
SPRAY NASAL
Qty: 1 EACH | Refills: 0 | Status: SHIPPED | OUTPATIENT
Start: 2019-06-10 | End: 2020-03-12 | Stop reason: SDUPTHER

## 2019-06-10 RX ORDER — ACETAMINOPHEN AND CODEINE PHOSPHATE 300; 30 MG/1; MG/1
TABLET ORAL
Qty: 90 TAB | Refills: 0 | Status: SHIPPED | OUTPATIENT
Start: 2019-06-10 | End: 2019-08-09 | Stop reason: SDUPTHER

## 2019-06-10 NOTE — TELEPHONE ENCOUNTER
Checked  and no aberrancies seen. Printed rx for tylenol #3. Requested Prescriptions     Signed Prescriptions Disp Refills    acetaminophen-codeine (TYLENOL #3) 300-30 mg per tablet 90 Tab 0     Sig: TAKE 1 TABLET BY MOUTH THREE TIMES DAILY AS NEEDED FOR PAIN FOR UP TO 30 DAYS. MAX DAILY AMOUNT 3 TABLETS     Authorizing Provider: JACKELYN Macdonald    naloxone (NARCAN) 4 mg/actuation nasal spray 1 Each 0     Sig: Use 1 spray intranasally, then discard. Repeat with new spray every 2 min as needed for opioid overdose symptoms, alternating nostrils. Authorizing Provider: Ravi Kay     This is ready for .

## 2019-06-10 NOTE — TELEPHONE ENCOUNTER
Requested Prescriptions     Pending Prescriptions Disp Refills    acetaminophen-codeine (TYLENOL #3) 300-30 mg per tablet [Pharmacy Med Name: ACETAMINOPHEN/COD #3 (300/30MG) TAB] 90 Tab 0     Sig: TAKE 1 TABLET BY MOUTH THREE TIMES DAILY AS NEEDED FOR PAIN FOR UP TO 30 DAYS.  MAX DAILY AMOUNT 3 TABLETS

## 2019-07-01 ENCOUNTER — OFFICE VISIT (OUTPATIENT)
Dept: INTERNAL MEDICINE CLINIC | Age: 54
End: 2019-07-01

## 2019-07-01 VITALS
WEIGHT: 258 LBS | HEIGHT: 72 IN | DIASTOLIC BLOOD PRESSURE: 83 MMHG | TEMPERATURE: 95.4 F | OXYGEN SATURATION: 97 % | RESPIRATION RATE: 19 BRPM | SYSTOLIC BLOOD PRESSURE: 126 MMHG | BODY MASS INDEX: 34.95 KG/M2 | HEART RATE: 60 BPM

## 2019-07-01 DIAGNOSIS — M54.12 CERVICAL RADICULOPATHY: ICD-10-CM

## 2019-07-01 DIAGNOSIS — E78.5 HYPERLIPIDEMIA, UNSPECIFIED HYPERLIPIDEMIA TYPE: ICD-10-CM

## 2019-07-01 DIAGNOSIS — I10 ESSENTIAL HYPERTENSION: ICD-10-CM

## 2019-07-01 DIAGNOSIS — G47.00 INSOMNIA, UNSPECIFIED TYPE: ICD-10-CM

## 2019-07-01 DIAGNOSIS — M54.50 LUMBAR SPINE PAIN: Primary | ICD-10-CM

## 2019-07-01 NOTE — PROGRESS NOTES
HISTORY OF PRESENT ILLNESS  Ermelinda Vance is a 46 y.o. male. HPI  Mr. Johana Sampson presents for follow-up HTN and insomnia. He will also complete fasting lab. See previous note. 1) HTN - much improved. He reports he has completely d/c'd EtOH and has actually lost 8 lbs since his last visit. He is working on a consistently improved lifestyle. He takes no BP medication. 2) Insomnia - improved. He reports Adrian Geo is working better for him than Ambien. He is sleeping pretty well. 3) Cervicalgia -   - 3rd/final neck injection with Dr. Annamaria Torres (of pain management) this afternoon. He will then f/u with Dr. Zackary Barr. 4) Left ear foreign body - removed by ENT. No c/o.     5) He needs a letter that clears him medically for hearing aids. 6) Lastly, he desires a permanent handicap placard rather than having to continually renew his. His back situation is chronic/unchanging. Will approve. Review of Systems   Musculoskeletal: Positive for back pain and neck pain. Psychiatric/Behavioral: The patient has insomnia (improved/controlled). Visit Vitals  /83 (BP 1 Location: Right arm, BP Patient Position: Sitting)   Pulse 60   Temp 95.4 °F (35.2 °C)   Resp 19   Ht 6' (1.829 m)   Wt 258 lb (117 kg)   SpO2 97%   BMI 34.99 kg/m²       Physical Exam   Constitutional: He is oriented to person, place, and time. He appears well-developed and well-nourished. No distress. HENT:   Head: Normocephalic and atraumatic. Right Ear: Tympanic membrane, external ear and ear canal normal.   Left Ear: Tympanic membrane, external ear and ear canal normal.   Nose: Nose normal.   Mouth/Throat: Uvula is midline, oropharynx is clear and moist and mucous membranes are normal. No oropharyngeal exudate, posterior oropharyngeal edema, posterior oropharyngeal erythema or tonsillar abscesses. Eyes: Pupils are equal, round, and reactive to light. Conjunctivae are normal. No scleral icterus. Neck: Neck supple.    Cardiovascular: Normal rate, regular rhythm and normal heart sounds. Exam reveals no gallop. No murmur heard. Pulses:       Dorsalis pedis pulses are 2+ on the right side, and 2+ on the left side. Posterior tibial pulses are 2+ on the right side, and 2+ on the left side. No pedal edema. Pulmonary/Chest: Effort normal and breath sounds normal. No respiratory distress. He has no decreased breath sounds. He has no wheezes. He has no rhonchi. He has no rales. Lymphadenopathy:        Head (right side): No submandibular and no tonsillar adenopathy present. Head (left side): No submandibular and no tonsillar adenopathy present. He has no cervical adenopathy. Right: No supraclavicular adenopathy present. Left: No supraclavicular adenopathy present. Neurological: He is alert and oriented to person, place, and time. Skin: Skin is warm and dry. Psychiatric: He has a normal mood and affect. His speech is normal.       ASSESSMENT and PLAN  Diagnoses and all orders for this visit:    1. Lumbar spine pain  - Handicap parking placard approved. See scanned form. 2. Cervical radiculopathy  - Continue with specialty care as advised. 3. Insomnia, unspecified type  - Continue Lunesta. Continue avoidance EtOH. 4. Essential hypertension  -     CBC W/O DIFF; Future  -     METABOLIC PANEL, COMPREHENSIVE; Future  -     TSH 3RD GENERATION; Future  - Continue hold of medication. Keep up the great work on lifestyle! 5. Hyperlipidemia, unspecified hyperlipidemia type  -     LIPID PANEL; Future   - Fasting. Follow-up and Dispositions    · Return in about 6 weeks (around 8/12/2019) for chronic pain - workman's comp. - Letter given for clearance for hearing aids.

## 2019-07-01 NOTE — LETTER
7/1/2019 8:51 AM 
 
Mr. Rodriguezell Casa. [address] [place] [id number] To whom it may concern: Mr. Jonathan Lai. is medically cleared for hearing aids.   
 
 
 
 
Sincerely, 
 
 
 
SERA De León

## 2019-07-01 NOTE — PROGRESS NOTES
RO[address] Darby Sandoval presents today for   Chief Complaint   Patient presents with    Hypertension       Fatimah Riddle. preferred language for health care discussion is english/other. Is someone accompanying this pt? no    Is the patient using any DME equipment during OV? no    Depression Screening:  3 most recent [facility] Screens 5/13/2019 3/11/2019 3/11/2019 2/4/2019 5/8/2018 4/6/2018 11/6/2017   Little interest or pleasure in doing things Not at all Not at all Not at all Not at all Not at all Not at all Not at all   Feeling down, depressed, irritable, or hopeless Not at all Not at all Not at all Not at all Not at all Not at all Not at all   Total Score PHQ 2 0 0 0 0 0 0 0       Learning Assessment:  Learning Assessment 7/18/2017   PRIMARY LEARNER Patient   HIGHEST LEVEL OF EDUCATION - PRIMARY LEARNER  > 4 YEARS OF COLLEGE   BARRIERS PRIMARY LEARNER NONE   CO-LEARNER CAREGIVER No   PRIMARY LANGUAGE ENGLISH   LEARNER PREFERENCE PRIMARY DEMONSTRATION   ANSWERED BY patient   RELATIONSHIP SELF       Abuse Screening:  No flowsheet data found. Fall Risk  No flowsheet data found. Health Maintenance reviewed and discussed per provider. Yes    Fatimah Barbamarkies. is due for   Health Maintenance Due   Topic Date Due    Pneumococcal 0-64 years (1 of 1 - PPSV23) [birth year]/1970    Shingrix Vaccine Age 50> (1 of 2) 11/28/2014    MEDICARE YEARLY EXAM  02/06/2019     Please order/place referral if appropriate. Advance Directive:  1. Do you have an advance directive in place? Patient Reply: no    2. If not, would you like material regarding how to put one in place? Patient Reply: no    Coordination of Care:  1. Have you been to the ER, urgent care clinic since your last visit? Hospitalized since your last visit? no    2. Have you seen or consulted any other health care providers outside of the [address] [place] [address] since your last visit? Include any pap smears or colon screening.  Pain management

## 2019-07-02 LAB
ALBUMIN SERPL-MCNC: 4.3 G/DL (ref 3.5–5.5)
ALBUMIN/GLOB SERPL: 1.7 {RATIO} (ref 1.2–2.2)
ALP SERPL-CCNC: 53 IU/L (ref 39–117)
ALT SERPL-CCNC: 37 IU/L (ref 0–44)
AST SERPL-CCNC: 30 IU/L (ref 0–40)
BILIRUB SERPL-MCNC: 0.5 MG/DL (ref 0–1.2)
BUN SERPL-MCNC: 22 MG/DL (ref 6–24)
BUN/CREAT SERPL: 15 (ref 9–20)
CALCIUM SERPL-MCNC: 9.3 MG/DL (ref 8.7–10.2)
CHLORIDE SERPL-SCNC: 103 MMOL/L (ref 96–106)
CHOLEST SERPL-MCNC: 125 MG/DL (ref 100–199)
CO2 SERPL-SCNC: 24 MMOL/L (ref 20–29)
CREAT SERPL-MCNC: 1.5 MG/DL (ref 0.76–1.27)
ERYTHROCYTE [DISTWIDTH] IN BLOOD BY AUTOMATED COUNT: 13.9 % (ref 12.3–15.4)
GLOBULIN SER CALC-MCNC: 2.6 G/DL (ref 1.5–4.5)
GLUCOSE SERPL-MCNC: 70 MG/DL (ref 65–99)
HCT VFR BLD AUTO: 44.2 % (ref 37.5–51)
HDLC SERPL-MCNC: 23 MG/DL
HGB BLD-MCNC: 14.6 G/DL (ref 13–17.7)
INTERPRETATION, 910389: NORMAL
LDLC SERPL CALC-MCNC: 88 MG/DL (ref 0–99)
MCH RBC QN AUTO: 31.8 PG (ref 26.6–33)
MCHC RBC AUTO-ENTMCNC: 33 G/DL (ref 31.5–35.7)
MCV RBC AUTO: 96 FL (ref 79–97)
PLATELET # BLD AUTO: 249 X10E3/UL (ref 150–450)
POTASSIUM SERPL-SCNC: 4.5 MMOL/L (ref 3.5–5.2)
PROT SERPL-MCNC: 6.9 G/DL (ref 6–8.5)
RBC # BLD AUTO: 4.59 X10E6/UL (ref 4.14–5.8)
SODIUM SERPL-SCNC: 142 MMOL/L (ref 134–144)
TRIGL SERPL-MCNC: 71 MG/DL (ref 0–149)
TSH SERPL DL<=0.005 MIU/L-ACNC: 1.3 UIU/ML (ref 0.45–4.5)
VLDLC SERPL CALC-MCNC: 14 MG/DL (ref 5–40)
WBC # BLD AUTO: 5 X10E3/UL (ref 3.4–10.8)

## 2019-07-03 NOTE — PROGRESS NOTES
ASCVD 10-year risk of 6.0%. **Please advise patient that his creatinine was elevated a little bit again. This reflects a mild decline of kidney function. Make sure to avoid OTC NSAIDS like Advil/Ibuprofen/Aleve/Naproxen. The Tylenol that is in the [address] [facility] [facility] he takes is not an NSAID and okay. Cholesterol looks much better. We will continue our current plan.

## 2019-08-05 DIAGNOSIS — M54.50 LUMBAR SPINE PAIN: ICD-10-CM

## 2019-08-05 DIAGNOSIS — M54.12 CERVICAL RADICULOPATHY: ICD-10-CM

## 2019-08-05 DIAGNOSIS — G47.00 INSOMNIA, UNSPECIFIED TYPE: ICD-10-CM

## 2019-08-05 NOTE — TELEPHONE ENCOUNTER
Patient request for a refill, lat OV 7/1/19, next scheduled 8/19/19. Requested Prescriptions     Pending Prescriptions Disp Refills    eszopiclone (LUNESTA) 3 mg tablet 30 Tab 2     Sig: Take 1 Tab by mouth nightly. Max Daily Amount: 3 mg.     gabapentin (NEURONTIN) 300 mg capsule 90 Cap 0     Sig: TAKE 2 CAPSULES BY MOUTH EVERY NIGHT AT BEDTIME AND 1 CAPSULE EVERY MORNING

## 2019-08-06 NOTE — TELEPHONE ENCOUNTER
PCP: Jina Mcfarland MD    Last appt: 7/1/2019  Future Appointments   Date Time Provider Matheus Hernandezi   8/12/2019  9:00 AM [facility] [place] [facility] NURSE [facility]   8/19/2019  7:45 AM SERA Nichols [facility]   8/21/2019  8:45 AM Suki Morgan MD [address]       Requested Prescriptions     Pending Prescriptions Disp Refills    eszopiclone (LUNESTA) 3 mg tablet 30 Tab 2     Sig: Take 1 Tab by mouth nightly. Max Daily Amount: 3 mg.  gabapentin (NEURONTIN) 300 mg capsule 90 Cap 0     Sig: TAKE 2 CAPSULES BY MOUTH EVERY NIGHT AT BEDTIME AND 1 CAPSULE EVERY MORNING       Request for a 30 or 90 day supply? Provider Discretion    Pharmacy: [facility]     Other Comments:   printed and placed in PCP medication refill review folder.      Last UDS date: 05/13/2019  Pain contract signed: none

## 2019-08-07 RX ORDER — GABAPENTIN 300 MG/1
CAPSULE ORAL
Qty: 90 CAP | Refills: 0 | OUTPATIENT
Start: 2019-08-07

## 2019-08-07 RX ORDER — ESZOPICLONE 3 MG/1
3 TABLET, FILM COATED ORAL
Qty: 30 TAB | Refills: 0 | Status: SHIPPED | OUTPATIENT
Start: 2019-08-07 | End: 2019-08-19 | Stop reason: SDUPTHER

## 2019-08-07 NOTE — TELEPHONE ENCOUNTER
Dr. Lyons [facility] has been prescribing Gabapentin for patient. Is there a reason he is not requesting the Gabapentin from him? Please advise. Lunesta approved. Gabapentin denied at present until better informed. - Upcoming appt 8/19/19.

## 2019-08-08 ENCOUNTER — TELEPHONE (OUTPATIENT)
Dept: INTERNAL MEDICINE CLINIC | Age: 54
End: 2019-08-08

## 2019-08-08 ENCOUNTER — TELEPHONE (OUTPATIENT)
Dept: ORTHOPEDIC SURGERY | Age: 54
End: 2019-08-08

## 2019-08-08 RX ORDER — ACETAMINOPHEN AND CODEINE PHOSPHATE 300; 30 MG/1; MG/1
TABLET ORAL
Qty: 90 TAB | Refills: 0 | OUTPATIENT
Start: 2019-08-08 | End: 2019-09-07

## 2019-08-08 NOTE — TELEPHONE ENCOUNTER
Received call from pt asking for RX refill of Gabapentin. Pt also adv he just finished the series of injections in his neck and wants to know if he needs to schedule a f/u. I looked through notes and wasn't sure if a f/u is needed to examine neck after injections or if a f/u is needed for the Gabapentin RX refill. I adv would have nurse review and advise.      Pt can be reached at [phone]

## 2019-08-08 NOTE — TELEPHONE ENCOUNTER
Patient is requesting a refill on gabapentin (NEURONTIN) 300 mg. Please advise and pend new Rx if appropriate.     Last visit:  3/11/19 with MD Paul Garcia   Next appt:  Return 2 weeks after cervical interlaminar injections  Previous refill encounter:  2/5/19 #180 with 2 refills    Patient's phone # [phone]    Patient wants this Rx sent to [facility] on the corner of [address] [place] [address]

## 2019-08-08 NOTE — TELEPHONE ENCOUNTER
Patient is asking for a refill of Cymbalta, states he is almost out.   This is not longer on the current med list.

## 2019-08-09 ENCOUNTER — OFFICE VISIT (OUTPATIENT)
Dept: ORTHOPEDIC SURGERY | Age: 54
End: 2019-08-09

## 2019-08-09 VITALS
OXYGEN SATURATION: 98 % | HEART RATE: 63 BPM | DIASTOLIC BLOOD PRESSURE: 79 MMHG | BODY MASS INDEX: 31.9 KG/M2 | SYSTOLIC BLOOD PRESSURE: 126 MMHG | WEIGHT: 235.2 LBS | TEMPERATURE: 98.2 F

## 2019-08-09 DIAGNOSIS — M54.12 CERVICAL RADICULOPATHY: ICD-10-CM

## 2019-08-09 DIAGNOSIS — M54.50 LUMBAR SPINE PAIN: ICD-10-CM

## 2019-08-09 DIAGNOSIS — M54.16 LUMBAR RADICULOPATHY: ICD-10-CM

## 2019-08-09 DIAGNOSIS — M54.2 NECK PAIN: ICD-10-CM

## 2019-08-09 DIAGNOSIS — M54.12 CERVICAL RADICULOPATHY: Primary | ICD-10-CM

## 2019-08-09 RX ORDER — ACETAMINOPHEN AND CODEINE PHOSPHATE 300; 30 MG/1; MG/1
1 TABLET ORAL
Qty: 90 TAB | Refills: 0 | Status: SHIPPED | OUTPATIENT
Start: 2019-08-09 | End: 2019-08-19 | Stop reason: SDUPTHER

## 2019-08-09 RX ORDER — ACETAMINOPHEN AND CODEINE PHOSPHATE 300; 30 MG/1; MG/1
1 TABLET ORAL
COMMUNITY
Start: 2017-08-07 | End: 2019-08-09 | Stop reason: SDUPTHER

## 2019-08-09 RX ORDER — GABAPENTIN 300 MG/1
CAPSULE ORAL
Qty: 120 CAP | Refills: 11 | Status: SHIPPED | OUTPATIENT
Start: 2019-08-09 | End: 2020-03-09 | Stop reason: SDUPTHER

## 2019-08-09 NOTE — TELEPHONE ENCOUNTER
Please see my original note. I would like clarification on why Dr. Livier Cuevas is not prescribing the Gabapentin as he has been prescribing this chronically. I have an appt 8/19/19 with him in regard to his chronic pain and med refills. However, I have never prescribed the Gabapentin for him in the past and am inquiring into this change.

## 2019-08-09 NOTE — PROGRESS NOTES
[address]  [address], [address]  [place], [address] [place] [address] Street  Phone: [phone]  Fax: [phone]    Sim Ramirez  : 1965  PCP: Sunita Salgado MD    PROGRESS NOTE    HISTORY OF PRESENT ILLNESS:  Chief Complaint   Patient presents with    Back Pain     FU    Neck Pain    Medication Refill     Jesus Alberto Garza is a 46 y.o.  male with history of neck pain and back. He was last seen with Dr. Leonila Richmond. He was to continue gabapentin and was referred for cervical injections. Today, he states he just finsihed the Cervical series with good benefit after the third. He feels he needs the continued gabapentin. He is taking 1 QAM, 1 in the afternoon and 2 QHS. Denies bladder/bowel dysfunction, saddle paresthesia, weakness, gait disturbance, or other neurological deficit. Pt at this time desires to  continue with current care/proceed with medication evaluation. Cervical MRI 19: Significant patient motion artifacts which may accentuate the degree of multifocal stenosis. At C5-6, mild central canal, severe left, and moderate right foraminal stenosis. At C3-4 and C6-7, mild central canal and moderate foraminal stenosis. Normal spinal cord signal    ASSESSMENT  46 y.o. male with neck pain. Diagnoses and all orders for this visit:    1. Cervical radiculopathy  -     gabapentin (NEURONTIN) 300 mg capsule; TAKE 2 CAPSULES BY MOUTH EVERY NIGHT AT BEDTIME AND 1 CAPSULE EVERY MORNING and 1 cap every afternoon. 2. Neck pain  -     gabapentin (NEURONTIN) 300 mg capsule; TAKE 2 CAPSULES BY MOUTH EVERY NIGHT AT BEDTIME AND 1 CAPSULE EVERY MORNING and 1 cap every afternoon. 3. Lumbar spine pain  -     gabapentin (NEURONTIN) 300 mg capsule; TAKE 2 CAPSULES BY MOUTH EVERY NIGHT AT BEDTIME AND 1 CAPSULE EVERY MORNING and 1 cap every afternoon.     4. Lumbar radiculopathy  -     gabapentin (NEURONTIN) 300 mg capsule; TAKE 2 CAPSULES BY MOUTH EVERY NIGHT AT BEDTIME AND 1 CAPSULE EVERY MORNING and 1 cap every afternoon. IMPRESSION/PLAN    1) Pt was given information on cerivical exercises. 2) cont gabapentin  3) cont HEP  4) Mr. Malcolm Grimm has a reminder for a \"due or due soon\" health maintenance. I have asked that he contact his primary care provider, Kai Bravo MD, for follow-up on this health maintenance. 5) We have informed patient to notify us for immediate appointment if he has any worsening neurogical symptoms or if an emergency situation presents, then call 911  5) Pt will follow-up in annually for a med fu. Risks and benefits of ongoing therapy have been reviewed with the patient.  is appropriate. PAST MEDICAL HISTORY  Past Medical History:   Diagnosis Date    Asthma     Benign prostatic hyperplasia with lower urinary tract symptoms     Benign prostatic hyperplasia with urinary obstruction     Cervicalgia     Chronic pain     Elevated PSA     Flank pain     Gout     Head ache     Hearing loss     Hypertrophy of prostate with urinary obstruction and other lower urinary tract symptoms (LUTS)     Kidney stones     Lumbago     Neuralgia     Testicular abnormality     Urge incontinence     Urinary frequency         MEDICATIONS  Current Outpatient Medications   Medication Sig Dispense Refill    gabapentin (NEURONTIN) 300 mg capsule TAKE 2 CAPSULES BY MOUTH EVERY NIGHT AT BEDTIME AND 1 CAPSULE EVERY MORNING and 1 cap every afternoon. 120 Cap 11    eszopiclone (LUNESTA) 3 mg tablet Take 1 Tab by mouth nightly. Max Daily Amount: 3 mg. 30 Tab 0    acetaminophen-codeine (TYLENOL #3) 300-30 mg per tablet TAKE 1 TABLET BY MOUTH THREE TIMES DAILY AS NEEDED FOR PAIN FOR UP TO 30 DAYS. MAX DAILY AMOUNT 3 TABLETS 90 Tab 0    tamsulosin (FLOMAX) 0.4 mg capsule Take 1 Cap by mouth daily (after dinner). 90 Cap 3    budesonide-formoterol (SYMBICORT) 80-4.5 mcg/actuation HFAA Take 2 Puffs by inhalation two (2) times a day.  1 Inhaler 5    albuterol (PROVENTIL HFA, VENTOLIN HFA, PROAIR HFA) 90 mcg/actuation inhaler Take  by inhalation.  naloxone (NARCAN) 4 mg/actuation nasal spray Use 1 spray intranasally, then discard. Repeat with new spray every 2 min as needed for opioid overdose symptoms, alternating nostrils. 1 Each 0    fenofibrate nanocrystallized (TRICOR) 145 mg tablet TAKE 1 TABLET BY MOUTH DAILY 90 Tab 0    naloxone (NARCAN) 4 mg/actuation nasal spray Use 1 spray intranasally, then discard. Repeat with new spray every 2 min as needed for opioid overdose symptoms, alternating nostrils.  2 Each 0       ALLERGIES  Allergies   Allergen Reactions    Bee Venom Protein (Honey Bee) Swelling       SOCIAL HISTORY    Social History     Socioeconomic History    Marital status:      Spouse name: Not on file    Number of children: Not on file    Years of education: Not on file    Highest education level: Not on file   Occupational History    Not on file   Social Needs    Financial resource strain: Not on file    Food insecurity:     Worry: Not on file     Inability: Not on file    Transportation needs:     Medical: Not on file     Non-medical: Not on file   Tobacco Use    Smoking status: Former Smoker    Smokeless tobacco: Never Used   Substance and Sexual Activity    Alcohol use: No     Frequency: Never    Drug use: No    Sexual activity: Yes     Partners: Female     Birth control/protection: None   Lifestyle    Physical activity:     Days per week: Not on file     Minutes per session: Not on file    Stress: Not on file   Relationships    Social connections:     Talks on phone: Not on file     Gets together: Not on file     Attends [employer] service: Not on file     Active member of club or organization: Not on file     Attends meetings of clubs or organizations: Not on file     Relationship status: Not on file    Intimate partner violence:     Fear of current or ex partner: Not on file     Emotionally abused: Not on file Physically abused: Not on file     Forced sexual activity: Not on file   Other Topics Concern    Not on file   Social History Narrative    ** Merged History Encounter **            SUBJECTIVE        Pain Scale: 6/10    Pain Assessment  8/9/2019   Location of Pain Neck;Back;Arm;Hand   Location Modifiers Right   Severity of Pain 6   Quality of Pain Sharp;Dull;Aching   Quality of Pain Comment numbness aand tingling down arm and leg   Duration of Pain Persistent   Frequency of Pain Constant   Aggravating Factors Walking; Other (Comment)   Aggravating Factors Comment too much yaard work;   Limiting Behavior -   Relieving Factors Other (Comment)   Relieving Factors Comment medication; Gabapentin   Result of Injury -       Accompanied by self. REVIEW OF SYSTEMS  ROS    Constitutional: Negative for fever, chills, or weight change. Respiratory: Negative for cough or shortness of breath. Cardiovascular: Negative for chest pain or palpitations. Gastrointestinal: Negative for acid reflux, change in bowel habits, or constipation. Genitourinary: Negative for incontinence, dysuria and flank pain. Musculoskeletal: Positive for neck and back pain. Skin: Negative for rash. Neurological: Negative for headaches, dizziness, or numbness. Endo/Heme/Allergies: Negative . Psychiatric/Behavioral: Negative. PHYSICAL EXAMINATION  Visit Vitals  /79   Pulse 63   Temp 98.2 °F (36.8 °C)   Wt 235 lb 3.2 oz (106.7 kg)   SpO2 98%   BMI 31.90 kg/m²       Constitutional: Well developed,  well nourished,  awake, alert, and in no acute distress. Neurological:  Sensation to light touch is intact. Psychiatric: Affect and mood are appropriate. Integumentary: No rashes or abrasions noted on exposed areas,  warm, dry and intact. Cardiovascular/Peripheral Vascular:  No peripheral edema is noted. Lymphatic:  No evidence of lymphedema. No cervical lymphadenopathy.      SPINE/MUSCULOSKELETAL EXAM    Cervical spine:  Neck is midline. Normal muscle tone. No focal atrophy is noted. Shoulder ROM intact. Tenderness to palpation to cervical spine. Negative Spurling's sign. Negative Tinel's sign. Negative Olmos's sign. Lumbar spine:  No rash, ecchymosis, or gross obliquity. No fasciculations. No focal atrophy is noted. Range of motion is intact. No Tenderness to palpation . SI joints non-tender. Trochanters non tender. Musculoskeletal:  No pain with extension, axial loading, or forward flexion. No pain with internal or external rotation of his hips. MOTOR    Biceps  Triceps Deltoids Wrist Ext Wrist Flex Hand Intrin   Right +4/5 +4/5 +4/5 +4/5 +4/5 +4/5   Left +4/5 +4/5 +4/5 +4/5 +4/5 +4/5      Hip Flex  Quads Hamstrings Ankle DF EHL Ankle PF   Right +4/5 +4/5 +4/5 +4/5 +4/5 +4/5   Left +4/5 +4/5 +4/5 +4/5 +4/5 +4/5   Straight Leg raise - bialterally. normal gait and station    Ambulation without assistive device. full weight bearing, non-antalgic gait.     Ankit Knight NP

## 2019-08-09 NOTE — PATIENT INSTRUCTIONS
Neck Arthritis: Exercises  Introduction  Here are some examples of exercises for you to try. The exercises may be suggested for a condition or for rehabilitation. Start each exercise slowly. Ease off the exercises if you start to have pain. You will be told when to start these exercises and which ones will work best for you. How to do the exercises  Neck stretches to the side    1. This stretch works best if you keep your shoulder down as you lean away from it. To help you remember to do this, start by relaxing your shoulders and lightly holding on to your thighs or your chair. 2. Tilt your head toward your shoulder and hold for 15 to 30 seconds. Let the weight of your head stretch your muscles. 3. Repeat 2 to 4 times toward each shoulder. Chin tuck    1. Lie on the floor with a rolled-up towel under your neck. Your head should be touching the floor. 2. Slowly bring your chin toward your chest.  3. Hold for a count of 6, and then relax for up to 10 seconds. 4. Repeat 8 to 12 times. Active cervical rotation    1. Sit in a firm chair, or stand up straight. 2. Keeping your chin level, turn your head to the right, and hold for 15 to 30 seconds. 3. Turn your head to the left and hold for 15 to 30 seconds. 4. Repeat 2 to 4 times to each side. Shoulder blade squeeze    1. While standing, squeeze your shoulder blades together. 2. Do not raise your shoulders up as you are squeezing. 3. Hold for 6 seconds. 4. Repeat 8 to 12 times. Shoulder rolls    1. Sit comfortably with your feet shoulder-width apart. You can also do this exercise standing up. 2. Roll your shoulders up, then back, and then down in a smooth, circular motion. 3. Repeat 2 to 4 times. Follow-up care is a key part of your treatment and safety. Be sure to make and go to all appointments, and call your doctor if you are having problems.  It's also a good idea to know your test results and keep a list of the medicines you take.  Where can you learn more? Go to http://josh-elicia.info/. Enter E399 in the search box to learn more about \"Neck Arthritis: Exercises. \"  Current as of: September 20, 2018  Content Version: 12.1  © 9252-8682 [employer], [employer]. Care instructions adapted under license by [employer] (which disclaims liability or warranty for this information). If you have questions about a medical condition or this instruction, always ask your healthcare professional. [place] [address] any warranty or liability for your use of this information.

## 2019-08-09 NOTE — PROGRESS NOTES
Jesse Cormier. presents today for   Chief Complaint   Patient presents with    Back Pain     FU    Neck Pain    Medication Refill       Is someone accompanying this pt? No    Is the patient using any DME equipment during OV? No    Depression Screening:  3 most recent PHQ Screens 8/9/2019   Little interest or pleasure in doing things Not at all   Feeling down, depressed, irritable, or hopeless Not at all   Total Score PHQ 2 0       Learning Assessment:  Learning Assessment 8/9/2019   PRIMARY LEARNER Patient   HIGHEST LEVEL OF EDUCATION - PRIMARY LEARNER  > 4 YEARS OF COLLEGE   BARRIERS PRIMARY LEARNER NONE   CO-LEARNER CAREGIVER No   PRIMARY LANGUAGE ENGLISH   LEARNER PREFERENCE PRIMARY DEMONSTRATION   ANSWERED BY Patient   RELATIONSHIP SELF         Coordination of Care:  1. Have you been to the ER, urgent care clinic since your last visit? No  Hospitalized since your last visit? No    2. Have you seen or consulted any other health care providers outside of the [address] [place] [address] since your last visit? No Include any pap smears or colon screening.  No    Last  Checked Today

## 2019-08-12 RX ORDER — BUDESONIDE AND FORMOTEROL FUMARATE DIHYDRATE 80; 4.5 UG/1; UG/1
2 AEROSOL RESPIRATORY (INHALATION) 2 TIMES DAILY
Qty: 1 INHALER | Refills: 5 | Status: SHIPPED | OUTPATIENT
Start: 2019-08-12 | End: 2019-08-19 | Stop reason: SDUPTHER

## 2019-08-12 NOTE — TELEPHONE ENCOUNTER
Patient contacted at home number. 2 patient identifiers confirmed. Patient states there was a mix up regarding the prescriptions he was asking for from this office. Patient states he has everything straightened out, he picked up prescription for Gabapentin from Dr Usman Diaz. He never wanted Gabapentin from this office. Patient states he came into office on Friday 08/09/2019 and picked up the prescriptions for Tylenol #3 and Lunesta. Patient states he now needs a refill of his prescription for Symbicort. Requested Prescriptions     Pending Prescriptions Disp Refills    budesonide-formoterol (SYMBICORT) 80-4.5 mcg/actuation HFAA 1 Inhaler 5     Sig: Take 2 Puffs by inhalation two (2) times a day. Signed Prescriptions Disp Refills    eszopiclone (LUNESTA) 3 mg tablet 30 Tab 0     Sig: Take 1 Tab by mouth nightly. Max Daily Amount: 3 mg.      Authorizing Provider: Martinez Aguirre     Refused Prescriptions Disp Refills    gabapentin (NEURONTIN) 300 mg capsule 90 Cap 0     Sig: TAKE 2 CAPSULES BY MOUTH EVERY NIGHT AT BEDTIME AND 1 CAPSULE EVERY MORNING     Refused By: Martinez Aguirre     Reason for Refusal: Inappropriate, get more info    acetaminophen-codeine (TYLENOL #3) 300-30 mg per tablet 90 Tab 0     Refused By: Butler Nageotte E     Reason for Refusal: other

## 2019-08-13 ENCOUNTER — DOCUMENTATION ONLY (OUTPATIENT)
Dept: ORTHOPEDIC SURGERY | Age: 54
End: 2019-08-13

## 2019-08-13 NOTE — PROGRESS NOTES
wc form was received from [facility], instructed patient that we will call when completed, may take 7-10 business days.

## 2019-08-15 NOTE — PROGRESS NOTES
This was a notice of denial of payment from 12/3/18 visit w/ Dr. Arturo Finn, given to [address] [place] [address].

## 2019-08-19 ENCOUNTER — OFFICE VISIT (OUTPATIENT)
Dept: INTERNAL MEDICINE CLINIC | Age: 54
End: 2019-08-19

## 2019-08-19 VITALS
HEART RATE: 56 BPM | TEMPERATURE: 97.8 F | RESPIRATION RATE: 18 BRPM | OXYGEN SATURATION: 97 % | SYSTOLIC BLOOD PRESSURE: 133 MMHG | DIASTOLIC BLOOD PRESSURE: 87 MMHG | WEIGHT: 232.4 LBS | BODY MASS INDEX: 31.48 KG/M2 | HEIGHT: 72 IN

## 2019-08-19 DIAGNOSIS — G47.00 INSOMNIA, UNSPECIFIED TYPE: ICD-10-CM

## 2019-08-19 DIAGNOSIS — M54.12 CERVICAL RADICULOPATHY: Primary | ICD-10-CM

## 2019-08-19 DIAGNOSIS — M54.50 LUMBAR SPINE PAIN: ICD-10-CM

## 2019-08-19 RX ORDER — ESZOPICLONE 3 MG/1
3 TABLET, FILM COATED ORAL
Qty: 30 TAB | Refills: 2 | Status: SHIPPED | OUTPATIENT
Start: 2019-09-06 | End: 2019-12-04 | Stop reason: ALTCHOICE

## 2019-08-19 RX ORDER — BUDESONIDE AND FORMOTEROL FUMARATE DIHYDRATE 80; 4.5 UG/1; UG/1
2 AEROSOL RESPIRATORY (INHALATION) 2 TIMES DAILY
Qty: 1 INHALER | Refills: 5 | Status: SHIPPED | OUTPATIENT
Start: 2019-08-19 | End: 2020-12-28 | Stop reason: SDUPTHER

## 2019-08-19 RX ORDER — ACETAMINOPHEN AND CODEINE PHOSPHATE 300; 30 MG/1; MG/1
1 TABLET ORAL
Qty: 90 TAB | Refills: 0 | Status: SHIPPED | OUTPATIENT
Start: 2019-09-06 | End: 2019-11-23 | Stop reason: SDUPTHER

## 2019-08-19 NOTE — PROGRESS NOTES
HISTORY OF PRESENT ILLNESS  Gabriella Whalen is a 46 y.o. male. HPI  Gabriella Montalvo. RTC today to follow up on chronic pain diagnosis. We discussed his spondylosis and radiculopathy that is affecting his back and neck. Significant changes since last visit: improved pain. He is  able to do his normal daily activities. He reports the following adverse side effects: none. - Seen by [address] [place] [address]. Specialists [facility], STEPHEN - Dr. Powell Sat colleague) 19. Recently completed cervical series injections via pain management with reported good benefit after the 3rd. Gabapentin was continued. - F/u 1 year. - Has lost considerable weight. He has significantly improved his diet and is exercising on the elliptical.     Least pain over the last week has been 3/10. Worst pain over the last week has been 7/10. Opioid Risk Tool Reviewed: NO:     Aberrant behaviors: None. Urine Drug Screen: reviewed and up to date. Controlled substance agreement on file: YES.  reviewed:yes - no aberrancies    Pill count is consistent with his prescription: yes  - Pain has been improved as of late. He reports he often only takes 2 Tylenol #3 in a day, sometimes 3.   - Gabapentin is helping as well as its current dosage - 1 qam, 1 qpm, 2 qhs. Concomitant use of a benzodiazepine: no    Meds <50 mme/day. Not warranted but patient has available. Also,  abstinence syndrome was reviewed and discussed with her today N/A    Review of Systems   Gastrointestinal: Negative for constipation. Musculoskeletal: Positive for back pain and neck pain.      Visit Vitals  /87 (BP 1 Location: Right arm, BP Patient Position: Sitting)   Pulse (!) 56   Temp 97.8 °F (36.6 °C) (Oral)   Resp 18   Ht 6' (1.829 m)   Wt 232 lb 6.4 oz (105.4 kg)   SpO2 97%   BMI 31.52 kg/m²     Wt Readings from Last 3 Encounters:   19 232 lb 6.4 oz (105.4 kg)   19 235 lb 3.2 oz (106.7 kg)   19 258 lb (117 kg) 3/11/19 - 268 lbs.  2/5/18 - 285 lbs. Physical Exam   Constitutional: He is oriented to person, place, and time. He appears well-developed and well-nourished. No distress. HENT:   Head: Normocephalic and atraumatic. Right Ear: Tympanic membrane, external ear and ear canal normal.   Left Ear: Tympanic membrane, external ear and ear canal normal.   Nose: Nose normal.   Mouth/Throat: Uvula is midline, oropharynx is clear and moist and mucous membranes are normal. No oropharyngeal exudate, posterior oropharyngeal edema, posterior oropharyngeal erythema or tonsillar abscesses. Eyes: Pupils are equal, round, and reactive to light. Conjunctivae are normal. No scleral icterus. Neck: Neck supple. Cardiovascular: Normal rate, regular rhythm and normal heart sounds. Exam reveals no gallop. No murmur heard. Pulses:       Dorsalis pedis pulses are 2+ on the right side, and 2+ on the left side. Posterior tibial pulses are 2+ on the right side, and 2+ on the left side. No pedal edema. Pulmonary/Chest: Effort normal and breath sounds normal. No respiratory distress. He has no decreased breath sounds. He has no wheezes. He has no rhonchi. He has no rales. Lymphadenopathy:        Head (right side): No submandibular and no tonsillar adenopathy present. Head (left side): No submandibular and no tonsillar adenopathy present. He has no cervical adenopathy. Right: No supraclavicular adenopathy present. Left: No supraclavicular adenopathy present. Neurological: He is alert and oriented to person, place, and time. Skin: Skin is warm and dry. Psychiatric: He has a normal mood and affect. His speech is normal.       ASSESSMENT and PLAN  Diagnoses and all orders for this visit:    1. Cervical radiculopathy  -     acetaminophen-codeine (TYLENOL #3) 300-30 mg per tablet; Take 1 Tab by mouth three (3) times daily as needed for Pain for up to 30 days. Max Daily Amount: 3 Tabs.    - Start 9/6/19. Rx given. 0 refills. 2. Lumbar spine pain  -     acetaminophen-codeine (TYLENOL #3) 300-30 mg per tablet; Take 1 Tab by mouth three (3) times daily as needed for Pain for up to 30 days. Max Daily Amount: 3 Tabs. 3. Insomnia, unspecified type  -     eszopiclone (LUNESTA) 3 mg tablet; Take 1 Tab by mouth nightly. Max Daily Amount: 3 mg.   - #30/2 refills. - Start 9/6/19. Other orders  -     budesonide-formoterol (SYMBICORT) 80-4.5 mcg/actuation HFAA; Take 2 Puffs by inhalation two (2) times a day. - Refill. Follow-up and Dispositions    · Return in about 3 months (around 11/19/2019) for follow-up chronic pain.

## 2019-08-19 NOTE — PROGRESS NOTES
Rm: 11    Chief Complaint   Patient presents with    Pain (Chronic)     Depression Screening:  3 most recent PHQ Screens 8/19/2019 8/9/2019 5/13/2019 3/11/2019 3/11/2019 2/4/2019 5/8/2018   Little interest or pleasure in doing things Not at all Not at all Not at all Not at all Not at all Not at all Not at all   Feeling down, depressed, irritable, or hopeless Not at all Not at all Not at all Not at all Not at all Not at all Not at all   Total Score PHQ 2 0 0 0 0 0 0 0       Learning Assessment:  Learning Assessment 8/9/2019 7/18/2017   PRIMARY LEARNER Patient Patient   HIGHEST LEVEL OF EDUCATION - PRIMARY LEARNER  > 4 YEARS OF COLLEGE > 4 YEARS OF COLLEGE   BARRIERS PRIMARY LEARNER NONE NONE   CO-LEARNER CAREGIVER No No   PRIMARY LANGUAGE ENGLISH ENGLISH   LEARNER PREFERENCE PRIMARY DEMONSTRATION DEMONSTRATION   ANSWERED BY Patient patient   RELATIONSHIP SELF SELF       Abuse Screening:  No flowsheet data found. Health Maintenance reviewed and discussed per provider: yes     Coordination of Care:    1. Have you been to the ER, urgent care clinic since your last visit? Hospitalized since your last visit? no    2. Have you seen or consulted any other health care providers outside of the [address] [place] [address] since your last visit? Include any pap smears or colon screening.  no

## 2019-09-17 NOTE — TELEPHONE ENCOUNTER
Called pt to advise him to take 2 tabs of the Neurontin, per Dr. Nelson Beatty recommendation. Pt did not answer. Left a message to call the office back.
Pt called back. I informed him that it was okay to take 2 tabs of the Neurontin.  Pt verbalized understanding and stated he's waiting for a call from the spine specialist.
Pt calling stating his pain is still pretty severe, and Dr Annabelle Barton had mentioned if that were the case, he should up his Neurontin to two tablets. Pt wanted to double check that the dosage increase would still be okay before doing so. (Routing comment)     Please advise pt that he should start taking the neurontin: 300 mg twice daily per Dr Marcelino Jj instructions.
[de-identified] : 69 year old female presents for an evaluation of chronic left shoulder pain. An MRI of the left shoulder obtained on 9/18/2018 revealed an intramedullary lesion medially within the proximal humeral metaphysis measuring 2 cm x 1.2 cm, tendinosis/tendinitis distally within the supraspinatus tendon, hypertrophic changes of the AC joint, as well as type II acromial configuration and ventrally downsloping acromion. She has been attending physical therapy and notes improvements in strength and ROM. The patient reports that she continues to experience an aching pain about her left shoulder that radiates down her left upper extremity. She has also been experiencing associate stiffness, as well as tingling sensations in her left shoulder and of the fingers of her left hand. Her symptoms are exacerbated with certain shoulder rotations. Of note, the patient has a history of Hodgkins Lymphoma and is s/p C4-5 and C5-6 anterior cervical diskectomy and fusion.

## 2019-11-23 DIAGNOSIS — M54.50 LUMBAR SPINE PAIN: ICD-10-CM

## 2019-11-23 DIAGNOSIS — M54.12 CERVICAL RADICULOPATHY: ICD-10-CM

## 2019-11-23 RX ORDER — ACETAMINOPHEN AND CODEINE PHOSPHATE 300; 30 MG/1; MG/1
TABLET ORAL
Qty: 90 TAB | Refills: 0 | Status: SHIPPED | OUTPATIENT
Start: 2019-11-23 | End: 2020-01-29

## 2019-12-04 ENCOUNTER — OFFICE VISIT (OUTPATIENT)
Dept: INTERNAL MEDICINE CLINIC | Age: 54
End: 2019-12-04

## 2019-12-04 ENCOUNTER — TELEPHONE (OUTPATIENT)
Dept: INTERNAL MEDICINE CLINIC | Age: 54
End: 2019-12-04

## 2019-12-04 VITALS
TEMPERATURE: 97.7 F | BODY MASS INDEX: 30.45 KG/M2 | HEART RATE: 65 BPM | DIASTOLIC BLOOD PRESSURE: 90 MMHG | WEIGHT: 224.8 LBS | SYSTOLIC BLOOD PRESSURE: 145 MMHG | HEIGHT: 72 IN | RESPIRATION RATE: 18 BRPM | OXYGEN SATURATION: 96 %

## 2019-12-04 DIAGNOSIS — M54.50 LUMBAR SPINE PAIN: Primary | ICD-10-CM

## 2019-12-04 DIAGNOSIS — F51.04 CHRONIC INSOMNIA: ICD-10-CM

## 2019-12-04 DIAGNOSIS — M54.12 CERVICAL RADICULOPATHY: ICD-10-CM

## 2019-12-04 RX ORDER — ACETAMINOPHEN AND CODEINE PHOSPHATE 300; 30 MG/1; MG/1
TABLET ORAL
Qty: 90 TAB | Refills: 0 | Status: CANCELLED | OUTPATIENT
Start: 2019-12-04 | End: 2020-01-03

## 2019-12-04 RX ORDER — CYCLOBENZAPRINE HCL 5 MG
5 TABLET ORAL
Qty: 45 TAB | Refills: 2 | Status: SHIPPED | OUTPATIENT
Start: 2019-12-04 | End: 2020-03-09 | Stop reason: ALTCHOICE

## 2019-12-04 RX ORDER — ZOLPIDEM TARTRATE 5 MG/1
5 TABLET ORAL
Qty: 30 TAB | Refills: 2 | Status: SHIPPED | OUTPATIENT
Start: 2019-12-04 | End: 2020-03-02 | Stop reason: SDUPTHER

## 2019-12-04 NOTE — PROGRESS NOTES
Rm:12    Chief Complaint   Patient presents with    Back Pain     Depression Screening:  3 most recent [facility] 36 Screens 12/4/2019 8/19/2019 8/9/2019 5/13/2019 3/11/2019 3/11/2019 2/4/2019   Little interest or pleasure in doing things Not at all Not at all Not at all Not at all Not at all Not at all Not at all   Feeling down, depressed, irritable, or hopeless Not at all Not at all Not at all Not at all Not at all Not at all Not at all   Total Score PHQ 2 0 0 0 0 0 0 0       Learning Assessment:  Learning Assessment 8/9/2019 7/18/2017   PRIMARY LEARNER Patient Patient   HIGHEST LEVEL OF EDUCATION - PRIMARY LEARNER  > 4 YEARS OF COLLEGE > 4 YEARS [place] CAREGIVER No No   PRIMARY LANGUAGE ENGLISH ENGLISH   LEARNER PREFERENCE PRIMARY DEMONSTRATION DEMONSTRATION   ANSWERED BY Patient patient   RELATIONSHIP SELF SELF       Abuse Screening:  No flowsheet data found. Health Maintenance reviewed and discussed per provider: yes     Coordination of Care:    1. Have you been to the ER, urgent care clinic since your last visit? Hospitalized since your last visit? no    2. Have you seen or consulted any other health care providers outside of the [address] [place] [address] since your last visit? Include any pap smears or colon screening.  no

## 2019-12-04 NOTE — PROGRESS NOTES
HISTORY OF PRESENT ILLNESS  Mindi Engle is a 53 y.o. male. HPI  Mindi Fatima. RTC today to follow up on work-related chronic pain diagnosis. We discussed his spondylosis and radiculopathy that is affecting his back and neck. Significant changes since last visit: pain sometimes increased since his last visit. He reports work comp has approved fewer chiropractic appts. He is also trying to increase his exercise which sometimes increases his back soreness. He is  able to do his normal daily activities. He reports the following adverse side effects: c/o strange dreams from [facility]. (chronic insomnia 2/2 pain). - Taking Gabapentin 4 daily. Taking 2-3 Tylenol #3 daily. Least pain over the last week has been 3/10. Worst pain over the last week has been 10/10. Opioid Risk Tool Reviewed: NO:     Aberrant behaviors: None    Urine Drug Screen: due - order placed. Controlled substance agreement on file: YES.  reviewed:yes    Pill count is consistent with his prescription: yes, lower amounts of pills filled than what last Rx was written. Most recent Rx does not appear to have been filled. Concomitant use of a benzodiazepine: no    <50 MME/day      Also,  abstinence syndrome was reviewed and discussed with her today N/A    Review of Systems   Gastrointestinal: Positive for abdominal pain (4 weeks - ruq, mild). Musculoskeletal: Positive for back pain and neck pain. Visit Vitals  /90 (BP 1 Location: Right arm, BP Patient Position: Sitting)   Pulse 65   Temp 97.7 °F (36.5 °C) (Oral)   Resp 18   Ht 6' (1.829 m)   Wt 224 lb 12.8 oz (102 kg)   SpO2 96%   BMI 30.49 kg/m²     Wt Readings from Last 3 Encounters:   19 224 lb 12.8 oz (102 kg)   19 232 lb (105.2 kg)   19 232 lb 6.4 oz (105.4 kg)       Physical Exam  Constitutional:       General: He is not in acute distress. Appearance: Normal appearance. He is well-developed.    HENT:      Head: Normocephalic and atraumatic. Right Ear: Tympanic membrane, ear canal and external ear normal.      Left Ear: Tympanic membrane, ear canal and external ear normal.      Nose: Nose normal.      Mouth/Throat:      Mouth: Mucous membranes are moist.      Pharynx: Uvula midline. No oropharyngeal exudate or posterior oropharyngeal erythema. Tonsils: No tonsillar abscesses. Eyes:      General: No scleral icterus. Conjunctiva/sclera: Conjunctivae normal.      Pupils: Pupils are equal, round, and reactive to light. Neck:      Musculoskeletal: Neck supple. Cardiovascular:      Rate and Rhythm: Normal rate and regular rhythm. Pulses: Normal pulses. Dorsalis pedis pulses are 2+ on the right side and 2+ on the left side. Posterior tibial pulses are 2+ on the right side and 2+ on the left side. Heart sounds: Normal heart sounds. No murmur. No gallop. Comments: No pedal edema. Pulmonary:      Effort: Pulmonary effort is normal. No respiratory distress. Breath sounds: Normal breath sounds. No decreased breath sounds, wheezing, rhonchi or rales. Abdominal:      General: Bowel sounds are normal. There is no distension. Palpations: Abdomen is soft. Tenderness: There is no tenderness. Negative signs include Helton's sign. Lymphadenopathy:      Head:      Right side of head: No submandibular or tonsillar adenopathy. Left side of head: No submandibular or tonsillar adenopathy. Cervical: No cervical adenopathy. Upper Body:      Right upper body: No supraclavicular adenopathy. Left upper body: No supraclavicular adenopathy. Skin:     General: Skin is warm and dry. Neurological:      Mental Status: He is alert and oriented to person, place, and time. Psychiatric:         Speech: Speech normal.         ASSESSMENT and PLAN  Diagnoses and all orders for this visit:    1. Lumbar spine pain  -     cyclobenzaprine (FLEXERIL) 5 mg tablet;  Take 1 Tab by mouth three (3) times daily as needed for Muscle Spasm(s). Caution: Drowsiness. No driving or working with use. - Trial for back spasms in an effort to minimize use of Tylenol #3. Caution of drowsiness discussed. Discussed avoidance of taking with Ambien. He agrees. - He will look into PT with work comp and let me know if he wishes to proceed - [facility] [place] would be location desired. - 11/23/19 Tylenol #3 refill sent - patient to . 2. Cervical radiculopathy    3. Chronic insomnia  -     zolpidem (AMBIEN) 5 mg tablet; Take 1 Tab by mouth nightly as needed for Sleep. Max Daily Amount: 5 mg.   - Changed back to this from [place] given dreams. Follow-up and Dispositions    · Return in about 3 months (around 3/4/2020) for follow-up chronic pain. Patient was advised to schedule consultation regarding RUQ abd pain. I do not believe this to be related to his back.

## 2019-12-05 ENCOUNTER — TELEPHONE (OUTPATIENT)
Dept: INTERNAL MEDICINE CLINIC | Age: 54
End: 2019-12-05

## 2019-12-05 DIAGNOSIS — M54.50 CHRONIC MIDLINE LOW BACK PAIN WITHOUT SCIATICA: ICD-10-CM

## 2019-12-05 DIAGNOSIS — G89.29 CHRONIC MIDLINE LOW BACK PAIN WITHOUT SCIATICA: ICD-10-CM

## 2019-12-05 DIAGNOSIS — M54.50 LUMBAR SPINE PAIN: Primary | ICD-10-CM

## 2019-12-05 NOTE — TELEPHONE ENCOUNTER
Patient called into let you know he checked with his Ins and states they will cover up to 10 visits of PT per [facility] year.

## 2019-12-06 NOTE — TELEPHONE ENCOUNTER
Please advise Mr. Óscar Limon referred him to [facility] physical therapy. Thank you.

## 2020-02-20 ENCOUNTER — OFFICE VISIT (OUTPATIENT)
Dept: INTERNAL MEDICINE CLINIC | Age: 55
End: 2020-02-20

## 2020-02-20 VITALS
HEART RATE: 95 BPM | RESPIRATION RATE: 20 BRPM | WEIGHT: 230 LBS | SYSTOLIC BLOOD PRESSURE: 138 MMHG | DIASTOLIC BLOOD PRESSURE: 90 MMHG | OXYGEN SATURATION: 98 % | BODY MASS INDEX: 31.15 KG/M2 | TEMPERATURE: 101.3 F | HEIGHT: 72 IN

## 2020-02-20 DIAGNOSIS — R68.89 FLU-LIKE SYMPTOMS: Primary | ICD-10-CM

## 2020-02-20 LAB
FLUAV+FLUBV AG NOSE QL IA.RAPID: NEGATIVE POS/NEG
FLUAV+FLUBV AG NOSE QL IA.RAPID: NEGATIVE POS/NEG
VALID INTERNAL CONTROL?: YES

## 2020-02-20 RX ORDER — OSELTAMIVIR PHOSPHATE 75 MG/1
75 CAPSULE ORAL 2 TIMES DAILY
Qty: 10 CAP | Refills: 0 | Status: SHIPPED | OUTPATIENT
Start: 2020-02-20 | End: 2020-02-25

## 2020-02-20 NOTE — PATIENT INSTRUCTIONS
Influenza (Flu): Care Instructions  Your Care Instructions    Influenza (flu) is an infection in the lungs and breathing passages. It is caused by the influenza virus. There are different strains, or types, of the flu virus from year to year. Unlike the common cold, the flu comes on suddenly and the symptoms, such as a cough, congestion, fever, chills, fatigue, aches, and pains, are more severe. These symptoms may last up to 10 days. Although the flu can make you feel very sick, it usually doesn't cause serious health problems. Home treatment is usually all you need for flu symptoms. But your doctor may prescribe antiviral medicine to prevent other health problems, such as pneumonia, from developing. Older people and those who have a long-term health condition, such as lung disease, are most at risk for having pneumonia or other health problems. Follow-up care is a key part of your treatment and safety. Be sure to make and go to all appointments, and call your doctor if you are having problems. It's also a good idea to know your test results and keep a list of the medicines you take. How can you care for yourself at home? · Get plenty of rest.  · Drink plenty of fluids, enough so that your urine is light yellow or clear like water. If you have kidney, heart, or liver disease and have to limit fluids, talk with your doctor before you increase the amount of fluids you drink. · Take an over-the-counter pain medicine if needed, such as acetaminophen (Tylenol), ibuprofen (Advil, Motrin), or naproxen (Aleve), to relieve fever, headache, and muscle aches. Read and follow all instructions on the label. No one younger than 20 should take aspirin. It has been linked to Reye syndrome, a serious illness. · Do not smoke. Smoking can make the flu worse. If you need help quitting, talk to your doctor about stop-smoking programs and medicines. These can increase your chances of quitting for good.   · Breathe moist air from a hot shower or from a sink filled with hot water to help clear a stuffy nose. · Before you use cough and cold medicines, check the label. These medicines may not be safe for young children or for people with certain health problems. · If the skin around your nose and lips becomes sore, put some petroleum jelly on the area. · To ease coughing:  ? Drink fluids to soothe a scratchy throat. ? Suck on cough drops or plain hard candy. ? Take an over-the-counter cough medicine that contains dextromethorphan to help you get some sleep. Read and follow all instructions on the label. ? Raise your head at night with an extra pillow. This may help you rest if coughing keeps you awake. · Take any prescribed medicine exactly as directed. Call your doctor if you think you are having a problem with your medicine. To avoid spreading the flu  · Wash your hands regularly, and keep your hands away from your face. · Stay home from school, work, and other public places until you are feeling better and your fever has been gone for at least 24 hours. The fever needs to have gone away on its own without the help of medicine. · Ask people living with you to talk to their doctors about preventing the flu. They may get antiviral medicine to keep from getting the flu from you. · To prevent the flu in the future, get a flu vaccine every fall. Encourage people living with you to get the vaccine. · Cover your mouth when you cough or sneeze. When should you call for help? Call 911 anytime you think you may need emergency care.  For example, call if:    · You have severe trouble breathing.    Call your doctor now or seek immediate medical care if:    · You have new or worse trouble breathing.     · You seem to be getting much sicker.     · You feel very sleepy or confused.     · You have a new or higher fever.     · You get a new rash.    Watch closely for changes in your health, and be sure to contact your doctor if:    · You begin to get better and then get worse.     · You are not getting better after 1 week. Where can you learn more? Go to http://josh-elicia.info/. Enter Z945 in the search box to learn more about \"Influenza (Flu): Care Instructions. \"  Current as of: June 9, 2019  Content Version: 12.2  © 1861-6941 [employer]. Care instructions adapted under license by [employer] (which disclaims liability or warranty for this information). If you have questions about a medical condition or this instruction, always ask your healthcare professional. [address] any warranty or liability for your use of this information.

## 2020-02-20 NOTE — PROGRESS NOTES
HISTORY OF PRESENT ILLNESS  Francis Dunaway is a 53 y.o. male. Visit Vitals  /90 (BP 1 Location: Right arm, BP Patient Position: Sitting)   Pulse 95   Temp (!) 101.3 °F (38.5 °C) (Oral)   Resp 20   Ht 6' (1.829 m)   Wt 230 lb (104.3 kg)   SpO2 98%   BMI 31.19 kg/m²       Yesterday started to feel bad around 4 PM. Then developed fevers, sweats, headaches, body aches. Temp to 102 at home    Fever    The history is provided by the patient. This is a new problem. The current episode started more than 2 days ago. Associated symptoms include headaches and cough. Review of Systems   Constitutional: Positive for chills, diaphoresis and fever. HENT:        Scratchy throat     Respiratory: Positive for cough. Neurological: Positive for headaches. Negative for dizziness. Physical Exam  Vitals signs and nursing note reviewed. Constitutional:       General: He is not in acute distress. Appearance: He is well-developed. He is not diaphoretic. HENT:      Right Ear: Tympanic membrane, ear canal and external ear normal.      Left Ear: Tympanic membrane, ear canal and external ear normal.      Nose: Mucosal edema present. Mouth/Throat:      Lips: Pink. Pharynx: Oropharynx is clear. Eyes:      Comments: Slight scleral injection bilateral   Cardiovascular:      Rate and Rhythm: Normal rate and regular rhythm. Pulmonary:      Effort: Pulmonary effort is normal.      Breath sounds: Normal breath sounds. Comments: Coughing paroxysms with deep inspiration  Lymphadenopathy:      Cervical: No cervical adenopathy. Skin:     General: Skin is warm and dry. Neurological:      Mental Status: He is alert and oriented to person, place, and time.          ASSESSMENT and PLAN    ICD-10-CM ICD-9-CM    1. Flu-like symptoms R68.89 780.99 AMB POC RAPID INFLUENZA TEST      oseltamivir (TAMIFLU) 75 mg capsule       Rapid flu is negative but he has ALL of the sxs and we are in a surge of flu right now so will tx empirically with Tamiflu    rest and fluids, Tylenol/NSAIDs for fever    F/u prn

## 2020-02-20 NOTE — PROGRESS NOTES
ROOM # [id number] [facility] Cesar Bell. presents today for   Chief Complaint   Patient presents with    Fever    Head Pain    Sweats       Wava Sorrel. preferred language for health care discussion is english/other. Is someone accompanying this pt? wife    Is the patient using any DME equipment during [address]? no    Depression Screening:  3 most recent [facility] [place] [facility] Screens 12/4/2019 8/19/2019 8/9/2019 5/13/2019 3/11/2019 3/11/2019 2/4/2019   Little interest or pleasure in doing things Not at all Not at all Not at all Not at all Not at all Not at all Not at all   Feeling down, depressed, irritable, or hopeless Not at all Not at all Not at all Not at all Not at all Not at all Not at all   Total Score PHQ 2 0 0 0 0 0 0 0       Learning Assessment:  Learning Assessment 8/9/2019 7/18/2017   PRIMARY LEARNER Patient Patient   HIGHEST LEVEL OF EDUCATION - PRIMARY LEARNER  > 4 YEARS OF COLLEGE > 4 YEARS OF COLLEGE   BARRIERS PRIMARY LEARNER NONE NONE   CO-LEARNER CAREGIVER No No   PRIMARY LANGUAGE ENGLISH ENGLISH   LEARNER PREFERENCE PRIMARY DEMONSTRATION DEMONSTRATION   ANSWERED BY Patient patient   RELATIONSHIP SELF SELF       Abuse Screening:  No flowsheet data found. Fall Risk  No flowsheet data found. Health Maintenance reviewed and discussed per provider. Yes    Silvina [place]. is due for   Health Maintenance Due   Topic Date Due    Shingrix Vaccine Age 50> (1 of 2) 11/28/2014    Medicare Yearly Exam  02/06/2019    Influenza Age 9 to Adult  08/01/2019     Please order/place referral if appropriate. Advance Directive:  1. Do you have an advance directive in place? Patient Reply: no    2. If not, would you like material regarding how to put one in place? Patient Reply: no    Coordination of Care:  1. Have you been to the ER, urgent care clinic since your last visit? Hospitalized since your last visit? no    2.  Have you seen or consulted any other health care providers outside of the [address] [place] [address] since your last visit? Include any pap smears or colon screening.  no

## 2020-02-21 ENCOUNTER — TELEPHONE (OUTPATIENT)
Dept: INTERNAL MEDICINE CLINIC | Age: 55
End: 2020-02-21

## 2020-02-21 NOTE — TELEPHONE ENCOUNTER
Spoke with patient to f/u yesterday's visit. He looked ill and most likely had the flu. He has been in bed most of today but reports the fever has lessened. His throat is scratchy and he has a dry cough. He has been trying to hydrate. I suggested lozenges and popsicles for the scratchy throat. They will also help with hydration. Call us if needed but continue to rest and hydrate. Tylenol or NSAID for fever and body aches.

## 2020-03-05 DIAGNOSIS — M54.12 CERVICAL RADICULOPATHY: ICD-10-CM

## 2020-03-05 DIAGNOSIS — M54.50 LUMBAR SPINE PAIN: ICD-10-CM

## 2020-03-06 RX ORDER — ACETAMINOPHEN AND CODEINE PHOSPHATE 300; 30 MG/1; MG/1
TABLET ORAL
Qty: 90 TAB | Refills: 0 | Status: SHIPPED | OUTPATIENT
Start: 2020-03-06 | End: 2020-05-18

## 2020-03-09 ENCOUNTER — OFFICE VISIT (OUTPATIENT)
Dept: INTERNAL MEDICINE CLINIC | Age: 55
End: 2020-03-09

## 2020-03-09 ENCOUNTER — HOSPITAL ENCOUNTER (OUTPATIENT)
Dept: LAB | Age: 55
Discharge: HOME | End: 2020-03-09
Payer: MEDICARE

## 2020-03-09 VITALS
HEIGHT: 72 IN | RESPIRATION RATE: 18 BRPM | OXYGEN SATURATION: 95 % | DIASTOLIC BLOOD PRESSURE: 83 MMHG | TEMPERATURE: 97.9 F | HEART RATE: 61 BPM | BODY MASS INDEX: 30.8 KG/M2 | WEIGHT: 227.4 LBS | SYSTOLIC BLOOD PRESSURE: 126 MMHG

## 2020-03-09 DIAGNOSIS — G89.29 CHRONIC MIDLINE LOW BACK PAIN WITHOUT SCIATICA: ICD-10-CM

## 2020-03-09 DIAGNOSIS — M54.12 CERVICAL RADICULOPATHY: ICD-10-CM

## 2020-03-09 DIAGNOSIS — M54.50 CHRONIC MIDLINE LOW BACK PAIN WITHOUT SCIATICA: ICD-10-CM

## 2020-03-09 DIAGNOSIS — M54.12 CERVICAL RADICULOPATHY: Primary | ICD-10-CM

## 2020-03-09 PROCEDURE — G0480 DRUG TEST DEF 1-7 CLASSES: HCPCS

## 2020-03-09 RX ORDER — TIZANIDINE 2 MG/1
2 TABLET ORAL
Qty: 60 TAB | Refills: 1 | Status: SHIPPED | OUTPATIENT
Start: 2020-03-09 | End: 2020-05-22 | Stop reason: ALTCHOICE

## 2020-03-09 NOTE — PROGRESS NOTES
Room #  12    Chief Complaint:  Chronic Back pain  Medication refill    HPI:    Laith lOsen. is a 53 y.o. male who presents today for c/o chronic back pain @ level 4 from work related injury and medication refill    1. Have you been to the ER, urgent care clinic since your last visit? Hospitalized since your last visit? NO When:    2. Have you seen or consulted any other health care providers outside of the [address] [place] [address] since your last visit? Include any pap smears or colon screening. NO  When :  Reason:      Health Maintenance reviewed Yes    Health Maintenance Due   Topic Date Due    Shingrix Vaccine Age 49> (1 of 2) 11/28/2014    Medicare Yearly Exam  02/06/2019     Depression Screening:  3 most recent PHQ Screens 3/9/2020   Little interest or pleasure in doing things Not at all   Feeling down, depressed, irritable, or hopeless Not at all   Total Score PHQ 2 0     Learning Assessment:  Learning Assessment 8/9/2019   PRIMARY LEARNER Patient   HIGHEST LEVEL OF EDUCATION - PRIMARY LEARNER  > 4 YEARS OF COLLEGE   BARRIERS PRIMARY LEARNER NONE   CO-LEARNER CAREGIVER No   PRIMARY LANGUAGE ENGLISH   LEARNER PREFERENCE PRIMARY DEMONSTRATION   ANSWERED BY Patient   RELATIONSHIP SELF     Abuse Screening:  No flowsheet data found. Fall Risk  No flowsheet data found.     Last  Checked n/a  Last UDS Checked n/a  Last Pain contract signed: n/a

## 2020-03-09 NOTE — PROGRESS NOTES
HISTORY OF PRESENT ILLNESS  Flori Mathias is a 53 y.o. male. HPI  Flori Quinonez. RTC today to follow up on chronic work-related pain diagnosis. We discussed his radiculopathy that is affecting his back and neck. Significant changes since last visit: c/o increased tingling paola hands. Hx of similar. When previously more severe, he received neck injections and this helped. He is  able to do his normal daily activities. He reports the following adverse side effects: extreme drowsiness with Flexeril. Reports work comp is now only approving 10 visits/year with chiropractor. Previously was consulting ~twice/month. He reports he was advised he may consult with a pain management specialist or with PT 10 visits/year as well. Least pain over the last week has been 3/10. Worst pain over the last week has been 8/10. Opioid Risk Tool Reviewed: NO:     Aberrant behaviors: None. Urine Drug Screen: due - order placed. Controlled substance agreement on file: with [employer]. Will complete one today.  reviewed:yes and no aberrancies    Pill count is consistent with his prescription: yes    Concomitant use of a benzodiazepine: no    Meds <50 MME/day    Has been given Rx Narcan, but states work comp will not cover it. Also,  abstinence syndrome was reviewed and discussed with her today N/A    Review of Systems   Musculoskeletal: Positive for back pain and neck pain. Psychiatric/Behavioral: The patient has insomnia (managed). Visit Vitals  /83 (BP 1 Location: Right arm, BP Patient Position: Sitting)   Pulse 61   Temp 97.9 °F (36.6 °C) (Oral)   Resp 18   Ht 6' (1.829 m)   Wt 227 lb 6.4 oz (103.1 kg)   SpO2 95%   BMI 30.84 kg/m²       Physical Exam  Constitutional:       General: He is not in acute distress. Appearance: Normal appearance. He is well-developed. HENT:      Head: Normocephalic and atraumatic.       Right Ear: Tympanic membrane, ear canal and external ear normal.      Left Ear: Tympanic membrane, ear canal and external ear normal.      Nose: Nose normal.      Mouth/Throat:      Mouth: Mucous membranes are moist.      Pharynx: Uvula midline. No oropharyngeal exudate or posterior oropharyngeal erythema. Tonsils: No tonsillar abscesses. Eyes:      General: No scleral icterus. Conjunctiva/sclera: Conjunctivae normal.      Pupils: Pupils are equal, round, and reactive to light. Neck:      Musculoskeletal: Neck supple. Cardiovascular:      Rate and Rhythm: Normal rate and regular rhythm. Pulses: Normal pulses. Dorsalis pedis pulses are 2+ on the right side and 2+ on the left side. Posterior tibial pulses are 2+ on the right side and 2+ on the left side. Heart sounds: Normal heart sounds. No murmur. No gallop. Pulmonary:      Effort: Pulmonary effort is normal. No respiratory distress. Breath sounds: Normal breath sounds. No decreased breath sounds, wheezing, rhonchi or rales. Musculoskeletal:      Cervical back: He exhibits no bony tenderness. Lumbar back: He exhibits no bony tenderness. Right lower leg: No edema. Left lower leg: No edema. Lymphadenopathy:      Head:      Right side of head: No submandibular or tonsillar adenopathy. Left side of head: No submandibular or tonsillar adenopathy. Cervical: No cervical adenopathy. Upper Body:      Right upper body: No supraclavicular adenopathy. Left upper body: No supraclavicular adenopathy. Skin:     General: Skin is warm and dry. Neurological:      Mental Status: He is alert and oriented to person, place, and time. Psychiatric:         Speech: Speech normal.         ASSESSMENT and PLAN  Diagnoses and all orders for this visit:    1. Cervical radiculopathy  -     REFERRAL TO PHYSICAL THERAPY   -     tiZANidine (ZANAFLEX) 2 mg tablet; Take 1 Tab by mouth two (2) times daily as needed for Muscle Spasm(s). - D/c Flexeril.  Goal of improved tolerability with Tizanidine.   -     TOXASSURE SELECT 13 (MW); Future  - Tylenol #3 was refilled 3/6/2020 but has not been filled. He will check with his pharmacy on this. - New contract signed. 2. Chronic midline low back pain without sciatica  -     REFERRAL TO PHYSICAL THERAPY  -     tiZANidine (ZANAFLEX) 2 mg tablet; Take 1 Tab by mouth two (2) times daily as needed for Muscle Spasm(s). -     Kaylin Alex 13 (MW); Future      Follow-up and Dispositions    · Return in about 3 months (around 6/9/2020) for chronic pain.

## 2020-03-09 NOTE — LETTER
AGREEMENT for controlled medication treatment Farooq Branham., have agreed to a course of treatment that includes taking controlled medication. For this treatment I designate _____________________________________ as the [facility] Provider.  The purpose of this agreement is to prevent misunderstandings about controlled medications I may be taking for treatment, and to comply with applicable laws. I understand this agreement is essential to the trust and confidence necessary in a provider-patient relationship and that the designated provider will treat me in accordance with the statements below. As part of my treatment I agree to the followin.  USE 
a. I will take the controlled medication as instructed by the designated provider and avoid improper use of controlled medications. b.   I will not share, sell or trade controlled medication with anyone as this is a criminal offense.  
c.   I will not use any illegal controlled substance, including marijuana, cocaine, etc. as this is a criminal offense. 2.  PROVIDERS 
a. I will only obtain controlled medication from the designated provider. b.   I will not attempt to obtain the same or similar controlled medications, such as opioid pain medication, stimulants, anti-anxiety or hypnotics from any other provider as this is a criminal offense. 
c.   I will inform the designated provider about all other licensed professionals providing medical care to me and authorize communication between all providers to coordinate care, particularly prescribing or dispensing of controlled medications. 3.  PHARMACY 
a.   I will only fill controlled medication prescriptions at the approved pharmacy as listed below. 4.  OFFICE VISITS 
a. I agree to attend scheduled office visit appointments. b.   I am aware my office visits may be monthly or as determined necessary by the designated provider. c.   I will communicate fully with the designated provider about the character and intensity of my symptoms, the effect of the symptoms on my daily life, and how well the controlled medication is helping to relieve the cause of my symptoms. 5.  REFILLS OR CALL-IN PRESCRIPTIONS OF CONTROLLED MEDICATIONS 
a. I agree that refills of my prescriptions for controlled medications will be made at the time of an office visit during regular office hours. No refills will be available during evenings or on weekends. Abusive or inappropriate behavior related to medication refills will not be tolerated. b.   I will not call the office repeatedly to inquire about my controlled medication. I understand that medications will be written on the due date and not before. Calling the office repeatedly will be considered harassment, and I may be discharged from the practice. c.   Controlled medications may not be called in for refill, but doing so is at the discretion of the designated provider. d.   I am aware that only I must  prescriptions for controlled medications at the office but the designated provider may allow a designee to  the prescription from the office under very specific circumstance that may develop. 6.  TOXICOLOGY SCREENING 
a. I agree to random urine toxicology screenings in order to comply with government and [address] [place] [address] regulations. I understand that I will be financially responsible for any charges incurred for the urine toxicology screening, which may not be covered by my insurance. Failure to do so will be considered non-compliance and I may be discharged. b. In some cases an oral swab or hair sample may be substituted for a urine screen. This is at the discretion of the designated provider. I understand that I will be financially responsible for any charges incurred as well. c.   I understand that if the urine toxicology does not show my medications prescribed to me by the designated provider, or it shows any illegal substance or any other medications NOT prescribed by the designated providers, I may be discharged from this practice at the discretion of the designated provider and no further controlled medications will be prescribed or follow-up appointments scheduled. Also, if any illegal substances are detected on the urine toxicology, this information may be provided to local law enforcement. 7. PILL COUNTS 
a. I am aware I may be called at any time to come into the office for a count of all my remaining controlled medications in order to help the designated provider understand the rate at which I use my controlled medications and to more effectively adjust dosage. b. I agree that I will use my medication at a rate NO greater than the prescribed rate and that use of my medication at a greater rate will result in my being without medication for the period of time until next expected due date. c. I will bring in the containers with the medication prescribed by all providers, including the designated provider, to each office visit even if there is no medication remaining. All controlled medication will be in the original containers from the pharmacy for each medication. Failure to do so will be considered non-compliance and I may be discharged from the practice. 8.  LOSS OR THEFT OF MEDICATION 
a. I will safeguard my controlled medication from loss or theft. b.   Lost or stolen controlled medication may not be replaced. This includes a prescription that has not yet been filled at the pharmacy. c.   In the event my controlled medications are stolen or lost, I will notify the designated providers office immediately. If such event occurs during the night, weekend or holiday, I will leave a detailed message on the answering machine or answering service at the number listed above. 
d.   I will file and produce an official police report for any effort to replace controlled medications prescribed. 9.  AGENCY COLLABORATION I authorize the designated provider and the authorized pharmacy/pharmacist to cooperate fully with any city, state, or federal law enforcement agency in the investigation of any possible misuse, sale or other diversion of my controlled medication. 10.  TREATMENT I understand that if I violate any of the conditions, my controlled medication and/or treatment will be terminated. If the violation involves obtaining controlled substances and/or dangerous drugs from another source, the incident may be reported to other medical facilities and authorities, including law enforcement. In this case, the designated provider may taper off the medication over a period of several days, as necessary, to avoid withdrawal symptoms or will suggest alternate treatment facilities. Also, a drug dependence treatment program may be recommended. 11.  AGREEMENT I agree to follow these guidelines that have been fully explained to me. All of my questions and concerns regarding treatment have been adequately answered. A copy of this document has been given to me. I agree to use ______________________________ Authorized Pharmacy located at _________________________________________________________________ Telephone ________________________________for filling ALL controlled medication prescriptions. This agreement is entered into on 3/9/2020 Patient signature__________________________________________________________ Legal Guardian signature___________________________________________________ Provider signature_________________________________________________________ Witness signature_________________________________________________________

## 2020-03-12 ENCOUNTER — OFFICE VISIT (OUTPATIENT)
Dept: ORTHOPEDIC SURGERY | Age: 55
End: 2020-03-12

## 2020-03-12 VITALS
OXYGEN SATURATION: 97 % | RESPIRATION RATE: 24 BRPM | TEMPERATURE: 98.1 F | HEIGHT: 72 IN | BODY MASS INDEX: 30.53 KG/M2 | SYSTOLIC BLOOD PRESSURE: 143 MMHG | DIASTOLIC BLOOD PRESSURE: 100 MMHG | WEIGHT: 225.4 LBS | HEART RATE: 69 BPM

## 2020-03-12 DIAGNOSIS — G89.4 CHRONIC PAIN SYNDROME: ICD-10-CM

## 2020-03-12 DIAGNOSIS — M54.12 CERVICAL RADICULOPATHY: Primary | ICD-10-CM

## 2020-03-12 DIAGNOSIS — M79.18 MYOFASCIAL PAIN: ICD-10-CM

## 2020-03-12 DIAGNOSIS — M79.18 CHRONIC PRIMARY MUSCULOSKELETAL PAIN: ICD-10-CM

## 2020-03-12 DIAGNOSIS — M54.2 NECK PAIN: ICD-10-CM

## 2020-03-12 DIAGNOSIS — G89.29 CHRONIC PRIMARY MUSCULOSKELETAL PAIN: ICD-10-CM

## 2020-03-12 DIAGNOSIS — M54.50 LUMBAR SPINE PAIN: ICD-10-CM

## 2020-03-12 RX ORDER — GABAPENTIN 300 MG/1
CAPSULE ORAL
Qty: 120 CAP | Refills: 5 | Status: SHIPPED | OUTPATIENT
Start: 2020-03-12 | End: 2020-03-13 | Stop reason: CLARIF
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
## 2020-03-13 LAB — DRUGS UR: NORMAL

## 2020-03-13 RX ORDER — GABAPENTIN 300 MG/1
CAPSULE ORAL
Qty: 120 CAP | Refills: 5 | Status: SHIPPED | OUTPATIENT
Start: 2020-03-13 | End: 2020-05-22 | Stop reason: SDUPTHER

## 2020-03-30 DIAGNOSIS — F51.04 CHRONIC INSOMNIA: ICD-10-CM

## 2020-03-30 RX ORDER — ZOLPIDEM TARTRATE 5 MG/1
5 TABLET ORAL
Qty: 30 TAB | Refills: 0 | Status: SHIPPED | OUTPATIENT
Start: 2020-03-30 | End: 2020-04-27

## 2020-03-30 NOTE — TELEPHONE ENCOUNTER
Patient request for refill. Last OV  3/9/20, next scheduled 6/8/20. Requested Prescriptions Pending Prescriptions Disp Refills  zolpidem (AMBIEN) 5 mg tablet 30 Tab 0 Sig: Take 1 Tab by mouth nightly as needed for Sleep. Max Daily Amount: 5 mg.

## 2020-04-03 ENCOUNTER — TELEPHONE (OUTPATIENT)
Dept: INTERNAL MEDICINE CLINIC | Age: 55
End: 2020-04-03

## 2020-04-03 NOTE — TELEPHONE ENCOUNTER
Patient called he has questions about his visit with Dr. Angus Samuel was told to call back.  He has been coughing and it has been continuing, with the Covid 19 he's concerned

## 2020-04-06 NOTE — TELEPHONE ENCOUNTER
2 pt identifiers confirmed. Pt states that he saw Dr. Zita Gitelman last month and he thinks he has recovered. Although he still has a dry cough that isn't consistent. He thinks it is pollen related and has taken Claritin. He has been checking his temperature everyday, no fever. Pt informed to schedule a televisit if symptoms worsen. Pt verbalized understanding.

## 2020-04-08 ENCOUNTER — VIRTUAL VISIT (OUTPATIENT)
Dept: INTERNAL MEDICINE CLINIC | Age: 55
End: 2020-04-08

## 2020-04-08 DIAGNOSIS — F51.04 CHRONIC INSOMNIA: ICD-10-CM

## 2020-04-08 DIAGNOSIS — M54.50 LUMBAR SPINE PAIN: ICD-10-CM

## 2020-04-08 DIAGNOSIS — I72.9 ANEURYSM (HCC): ICD-10-CM

## 2020-04-08 DIAGNOSIS — M54.12 CERVICAL RADICULOPATHY: ICD-10-CM

## 2020-04-08 DIAGNOSIS — M54.2 NECK PAIN: Primary | ICD-10-CM

## 2020-04-08 DIAGNOSIS — M54.16 LUMBAR RADICULOPATHY: ICD-10-CM

## 2020-04-08 NOTE — PROGRESS NOTES
Consent: Fiona Carrillo, who was seen by synchronous (real-time) audio-video technology, and/or his healthcare decision maker, is aware that this patient-initiated, Telehealth encounter on 4/8/2020 is a billable service, with coverage as determined by his insurance carrier. He is aware that he may receive a bill and has provided verbal consent to proceed: Yes. Assessment & Plan:   Diagnoses and all orders for this visit:    1. Neck pain  2. Cervical radiculopathy  3. Lumbar spine pain  4. Lumbar radiculopathy  5. Chronic insomnia  - Asked patient to drop paperwork by office for my review and completion.   - He has remained stable on his current regimen. He has complied with UDS and results have been c/w expected. I recommend we continue with our current plan of limiting med use with chiropractic care and periodic injections. May proceed with PT when able/needed. 6. Aneurysm ([address]  - He will bring records regarding this. Discussed we will likely need updated imaging to better assess. We want to monitor for change of this as it is possible that surgery could be necessary. 712  Subjective:   Fiona Carrillo is a 53 y.o. male who was seen for Follow-up    1) Patient reports he received a letter 3/9/2020 for medication review from [employer]. Letter is asking for consideration of tapering off medications. Patient has remained stable chronically with avoidance of excessive med use. Dosages/frequency of use of Rx's is not high. - Currently taking 1 Zanaflex daily and 1 Tylenol #3 daily. Admits to taking more on days of yard work or increased activity.   - Still following with chiropractor for routine care which helps significantly. He reports PT has not been approved by [employer] comp but at present he wouldn't be able to do PT due to COVID-19, so we will wait on this for now. - See recent ortho note - Dr. Marjorie Azul. - A prior aneurysm is referenced in Dr. Amira Tierney note.  I do not see evidence of this from any available imaging studies to me. Patient reports this has been known for years. He will look for prior imaging studies. He reports he saw neurology and possibly vascular surgery or neurosurgery in the past. He states aneurysm is in the base of his head/neck. He elected to avoid surgery for this. Prior to Admission medications    Medication Sig Start Date End Date Taking? Authorizing Provider   zolpidem (AMBIEN) 5 mg tablet Take 1 Tab by mouth nightly as needed for Sleep. Max Daily Amount: 5 mg. 3/30/20   SERA Collazo   gabapentin (NEURONTIN) 300 mg capsule Take 1 cap po qam, 1 cap po every afternoon and 2 caps po qhs. 3/13/20   Mary Langford MD   tiZANidine (ZANAFLEX) 2 mg tablet Take 1 Tab by mouth two (2) times daily as needed for Muscle Spasm(s). 3/9/20   SERA Collazo   acetaminophen-codeine (TYLENOL #3) 300-30 mg per tablet TAKE 1 TABLET BY MOUTH THREE TIMES DAILY AS NEEDED FOR PAIN. MAX DAILY AMOUNT 3 TABLETS 3/6/20 4/5/20  SERA Collazo   tamsulosin (FLOMAX) 0.4 mg capsule TAKE 1 CAPSULE BY MOUTH DAILY AFTER DINNER 3/5/20   Travon Ramirez MD   budesonide-formoterol (SYMBICORT) 80-4.5 mcg/actuation HFAA Take 2 Puffs by inhalation two (2) times a day. 8/19/19   SERA Ashby   fenofibrate nanocrystallized (TRICOR) 145 mg tablet TAKE 1 TABLET BY MOUTH DAILY 5/13/19   SERA Ashby   naloxone [facility]) 4 mg/actuation nasal spray Use 1 spray intranasally, then discard. Repeat with new spray every 2 min as needed for opioid overdose symptoms, alternating nostrils. 3/11/19   SERA Collazo   albuterol (PROVENTIL HFA, VENTOLIN HFA, PROAIR HFA) 90 mcg/actuation inhaler Take  by inhalation.     Provider, Historical     Allergies   Allergen Reactions    Bee Venom Protein (Honey Bee) Swelling       Past Medical History:   Diagnosis Date    Asthma     Benign prostatic hyperplasia with lower urinary tract symptoms     Benign prostatic hyperplasia with urinary obstruction     Cervicalgia     Chronic pain     Elevated PSA     Flank pain     Gout     Head ache     Hearing loss     Hypertrophy of prostate with urinary obstruction and other lower urinary tract symptoms (LUTS)     Kidney stones     Lumbago     Neuralgia     Testicular abnormality     Urge incontinence     Urinary frequency      Past Surgical History:   Procedure Laterality Date    HX HERNIA REPAIR      HX TONSILLECTOMY      HX UROLOGICAL  8/27/15    PNBx-TRUS Vol 54 cc's, Benign, Dr. Anastasia Jhaveri         Review of Systems   Musculoskeletal: Positive for back pain and neck pain. Objective:   Vital Signs: (As obtained by patient/caregiver at home)  There were no vitals taken for this visit.      [INSTRUCTIONS:  \"[x]\" Indicates a positive item  \"[]\" Indicates a negative item  -- DELETE ALL ITEMS NOT EXAMINED]    Constitutional: [x] Appears well-developed and well-nourished [x] No apparent distress      [] Abnormal -     Mental status: [x] Alert and awake  [x] Oriented to person/place/time [x] Able to follow commands    [] Abnormal -     Eyes:   EOM    []  Normal    [] Abnormal -   Sclera  [x]  Normal    [] Abnormal -          Discharge [x]  None visible   [] Abnormal -     HENT: [x] Normocephalic, atraumatic  [] Abnormal -   [x] Mouth/Throat: Mucous membranes are moist    External Ears [x] Normal  [] Abnormal -    Neck: [x] No visualized mass [] Abnormal -     Pulmonary/Chest: [x] Respiratory effort normal   [x] No visualized signs of difficulty breathing or respiratory distress        [] Abnormal -      Musculoskeletal:   [] Normal gait with no signs of ataxia         [] Normal range of motion of neck        [] Abnormal -     Neurological:        [x] No Facial Asymmetry (Cranial nerve 7 motor function) (limited exam due to video visit)          [x] No gaze palsy        [] Abnormal -          Skin:        [x] No significant exanthematous lesions or discoloration noted on facial skin         [] Abnormal -            Psychiatric:       [x] Normal Affect [] Abnormal -        [x] No Hallucinations    Other pertinent observable physical exam findings:-        We discussed the expected course, resolution and complications of the diagnosis(es) in detail. Medication risks, benefits, costs, interactions, and alternatives were discussed as indicated. I advised him to contact the office if his condition worsens, changes or fails to improve as anticipated. He expressed understanding with the diagnosis(es) and plan. Darin Malone. is a 53 y.o. male being evaluated by a video visit encounter for concerns as above. A caregiver was present when appropriate. Due to this being a TeleHealth encounter ([facility]), evaluation of the following organ systems was limited: Vitals/Constitutional/EENT/Resp/CV/GI//MS/Neuro/Skin/Heme-Lymph-Imm. Pursuant to the emergency declaration under the [facility], 1135 waiver authority and the [employer] Act, this Virtual  Visit was conducted, with patient's (and/or legal guardian's) consent, to reduce the patient's risk of exposure to COVID-19 and provide necessary medical care. Services were provided through a video synchronous discussion virtually to substitute for in-person clinic visit. Patient and provider were located at their individual homes.         SERA Carmen

## 2020-04-17 ENCOUNTER — TELEPHONE (OUTPATIENT)
Dept: INTERNAL MEDICINE CLINIC | Age: 55
End: 2020-04-17

## 2020-04-17 DIAGNOSIS — I65.1 VERTEBROBASILAR DOLICHOECTASIA: Primary | ICD-10-CM

## 2020-04-17 NOTE — TELEPHONE ENCOUNTER
Please notify  Matthew Melissa that I have written a letter answering the questions from his workman's comp. I would also like to consult with neurosurgery for the vertebral artery issue. I have placed a referral. Please take a copy of these records with him to his appt.

## 2020-04-29 ENCOUNTER — HOSPITAL ENCOUNTER (INPATIENT)
Age: 55
LOS: 2 days | Discharge: HOME | DRG: 072 | End: 2020-05-01
Attending: EMERGENCY MEDICINE | Admitting: HOSPITALIST
Payer: MEDICARE

## 2020-04-29 ENCOUNTER — APPOINTMENT (OUTPATIENT)
Dept: CT IMAGING | Age: 55
DRG: 072 | End: 2020-04-29
Attending: PHYSICIAN ASSISTANT
Payer: MEDICARE

## 2020-04-29 ENCOUNTER — APPOINTMENT (OUTPATIENT)
Dept: GENERAL RADIOLOGY | Age: 55
DRG: 072 | End: 2020-04-29
Attending: PHYSICIAN ASSISTANT
Payer: MEDICARE

## 2020-04-29 DIAGNOSIS — G45.9 TIA (TRANSIENT ISCHEMIC ATTACK): Primary | ICD-10-CM

## 2020-04-29 PROBLEM — J45.909 ASTHMA: Status: ACTIVE | Noted: 2020-04-29

## 2020-04-29 PROBLEM — I63.9 CVA (CEREBRAL VASCULAR ACCIDENT) (HCC): Status: ACTIVE | Noted: 2020-04-29

## 2020-04-29 LAB
ALBUMIN SERPL-MCNC: 4.2 G/DL (ref 3.4–5)
ALBUMIN/GLOB SERPL: 1.4 {RATIO} (ref 0.8–1.7)
ALP SERPL-CCNC: 84 U/L (ref 45–117)
ALT SERPL-CCNC: 27 U/L (ref 16–61)
ANION GAP SERPL CALC-SCNC: 6 MMOL/L (ref 3–18)
APPEARANCE UR: CLEAR
AST SERPL-CCNC: 23 U/L (ref 10–38)
BACTERIA URNS QL MICRO: ABNORMAL /HPF
BASOPHILS # BLD: 0 K/UL (ref 0–0.1)
BASOPHILS NFR BLD: 1 % (ref 0–2)
BILIRUB SERPL-MCNC: 0.7 MG/DL (ref 0.2–1)
BILIRUB UR QL: NEGATIVE
BUN SERPL-MCNC: 16 MG/DL (ref 7–18)
BUN/CREAT SERPL: 14 (ref 12–20)
CALCIUM SERPL-MCNC: 9 MG/DL (ref 8.5–10.1)
CHLORIDE SERPL-SCNC: 106 MMOL/L (ref 100–111)
CO2 SERPL-SCNC: 25 MMOL/L (ref 21–32)
COLOR UR: YELLOW
CREAT SERPL-MCNC: 1.15 MG/DL (ref 0.6–1.3)
DIFFERENTIAL METHOD BLD: ABNORMAL
EOSINOPHIL # BLD: 0.1 K/UL (ref 0–0.4)
EOSINOPHIL NFR BLD: 1 % (ref 0–5)
EPITH CASTS URNS QL MICRO: ABNORMAL /LPF (ref 0–5)
ERYTHROCYTE [DISTWIDTH] IN BLOOD BY AUTOMATED COUNT: 13.6 % (ref 11.6–14.5)
GLOBULIN SER CALC-MCNC: 2.9 G/DL (ref 2–4)
GLUCOSE BLD STRIP.AUTO-MCNC: 138 MG/DL (ref 70–110)
GLUCOSE BLD STRIP.AUTO-MCNC: 89 MG/DL (ref 70–110)
GLUCOSE SERPL-MCNC: 120 MG/DL (ref 74–99)
GLUCOSE UR STRIP.AUTO-MCNC: NEGATIVE MG/DL
HCT VFR BLD AUTO: 44.2 % (ref 36–48)
HGB BLD-MCNC: 15.2 G/DL (ref 13–16)
HGB UR QL STRIP: NEGATIVE
INR PPP: 1 (ref 0.8–1.2)
KETONES UR QL STRIP.AUTO: NEGATIVE MG/DL
LEUKOCYTE ESTERASE UR QL STRIP.AUTO: ABNORMAL
LYMPHOCYTES # BLD: 1.8 K/UL (ref 0.9–3.6)
LYMPHOCYTES NFR BLD: 25 % (ref 21–52)
MAGNESIUM SERPL-MCNC: 2 MG/DL (ref 1.6–2.6)
MCH RBC QN AUTO: 32.8 PG (ref 24–34)
MCHC RBC AUTO-ENTMCNC: 34.4 G/DL (ref 31–37)
MCV RBC AUTO: 95.5 FL (ref 74–97)
MONOCYTES # BLD: 0.5 K/UL (ref 0.05–1.2)
MONOCYTES NFR BLD: 7 % (ref 3–10)
NEUTS SEG # BLD: 4.7 K/UL (ref 1.8–8)
NEUTS SEG NFR BLD: 66 % (ref 40–73)
NITRITE UR QL STRIP.AUTO: NEGATIVE
PH UR STRIP: 7 [PH] (ref 5–8)
PLATELET # BLD AUTO: 238 K/UL (ref 135–420)
PMV BLD AUTO: 10 FL (ref 9.2–11.8)
POTASSIUM SERPL-SCNC: 3.8 MMOL/L (ref 3.5–5.5)
PROT SERPL-MCNC: 7.1 G/DL (ref 6.4–8.2)
PROT UR STRIP-MCNC: NEGATIVE MG/DL
PROTHROMBIN TIME: 12.8 SEC (ref 11.5–15.2)
RBC # BLD AUTO: 4.63 M/UL (ref 4.7–5.5)
RBC #/AREA URNS HPF: ABNORMAL /HPF (ref 0–5)
SODIUM SERPL-SCNC: 137 MMOL/L (ref 136–145)
SP GR UR REFRACTOMETRY: 1.01 (ref 1–1.03)
TROPONIN I SERPL-MCNC: <0.02 NG/ML (ref 0–0.04)
UROBILINOGEN UR QL STRIP.AUTO: 0.2 EU/DL (ref 0.2–1)
WBC # BLD AUTO: 7.1 K/UL (ref 4.6–13.2)
WBC URNS QL MICRO: ABNORMAL /HPF (ref 0–4)

## 2020-04-29 PROCEDURE — 83735 ASSAY OF MAGNESIUM: CPT

## 2020-04-29 PROCEDURE — 85610 PROTHROMBIN TIME: CPT

## 2020-04-29 PROCEDURE — 71045 X-RAY EXAM CHEST 1 VIEW: CPT

## 2020-04-29 PROCEDURE — 85025 COMPLETE CBC W/AUTO DIFF WBC: CPT

## 2020-04-29 PROCEDURE — 84484 ASSAY OF TROPONIN QUANT: CPT

## 2020-04-29 PROCEDURE — 80053 COMPREHEN METABOLIC PANEL: CPT

## 2020-04-29 PROCEDURE — 74011250637 HC RX REV CODE- 250/637: Performed by: EMERGENCY MEDICINE

## 2020-04-29 PROCEDURE — 81001 URINALYSIS AUTO W/SCOPE: CPT

## 2020-04-29 PROCEDURE — 65270000029 HC RM PRIVATE

## 2020-04-29 PROCEDURE — 93005 ELECTROCARDIOGRAM TRACING: CPT

## 2020-04-29 PROCEDURE — 99285 EMERGENCY DEPT VISIT HI MDM: CPT

## 2020-04-29 PROCEDURE — 82962 GLUCOSE BLOOD TEST: CPT

## 2020-04-29 PROCEDURE — 77030021352 HC CBL LD SYS DISP COVD -B

## 2020-04-29 PROCEDURE — 70450 CT HEAD/BRAIN W/O DYE: CPT

## 2020-04-29 PROCEDURE — 74011250636 HC RX REV CODE- 250/636: Performed by: EMERGENCY MEDICINE

## 2020-04-29 RX ORDER — FOLIC ACID 1 MG/1
1 TABLET ORAL DAILY
Status: DISCONTINUED | OUTPATIENT
Start: 2020-04-30 | End: 2020-05-01 | Stop reason: HOSPADM

## 2020-04-29 RX ORDER — LORAZEPAM 1 MG/1
2 TABLET ORAL
Status: DISCONTINUED | OUTPATIENT
Start: 2020-04-29 | End: 2020-05-01 | Stop reason: HOSPADM

## 2020-04-29 RX ORDER — SODIUM CHLORIDE 0.9 % (FLUSH) 0.9 %
5-40 SYRINGE (ML) INJECTION EVERY 8 HOURS
Status: DISCONTINUED | OUTPATIENT
Start: 2020-04-29 | End: 2020-05-01 | Stop reason: HOSPADM

## 2020-04-29 RX ORDER — CLOPIDOGREL BISULFATE 75 MG/1
75 TABLET ORAL
Status: COMPLETED | OUTPATIENT
Start: 2020-04-29 | End: 2020-04-29

## 2020-04-29 RX ORDER — SODIUM CHLORIDE 0.9 % (FLUSH) 0.9 %
5-40 SYRINGE (ML) INJECTION AS NEEDED
Status: DISCONTINUED | OUTPATIENT
Start: 2020-04-29 | End: 2020-05-01 | Stop reason: HOSPADM

## 2020-04-29 RX ORDER — FENOFIBRATE 145 MG/1
145 TABLET, COATED ORAL DAILY
Status: DISCONTINUED | OUTPATIENT
Start: 2020-04-30 | End: 2020-05-01 | Stop reason: HOSPADM

## 2020-04-29 RX ORDER — LORAZEPAM 2 MG/ML
2 INJECTION INTRAMUSCULAR
Status: DISCONTINUED | OUTPATIENT
Start: 2020-04-29 | End: 2020-05-01 | Stop reason: HOSPADM

## 2020-04-29 RX ORDER — TIZANIDINE 4 MG/1
2 TABLET ORAL
Status: DISCONTINUED | OUTPATIENT
Start: 2020-04-29 | End: 2020-05-01 | Stop reason: HOSPADM

## 2020-04-29 RX ORDER — ACETAMINOPHEN AND CODEINE PHOSPHATE 300; 30 MG/1; MG/1
1 TABLET ORAL
Status: DISCONTINUED | OUTPATIENT
Start: 2020-04-29 | End: 2020-05-01 | Stop reason: HOSPADM

## 2020-04-29 RX ORDER — TAMSULOSIN HYDROCHLORIDE 0.4 MG/1
0.4 CAPSULE ORAL DAILY
Status: DISCONTINUED | OUTPATIENT
Start: 2020-04-30 | End: 2020-05-01 | Stop reason: HOSPADM

## 2020-04-29 RX ORDER — ASPIRIN 325 MG
325 TABLET ORAL
Status: COMPLETED | OUTPATIENT
Start: 2020-04-29 | End: 2020-04-29

## 2020-04-29 RX ORDER — LORAZEPAM 1 MG/1
1 TABLET ORAL
Status: DISCONTINUED | OUTPATIENT
Start: 2020-04-29 | End: 2020-05-01 | Stop reason: HOSPADM

## 2020-04-29 RX ORDER — LORAZEPAM 2 MG/ML
1 INJECTION INTRAMUSCULAR
Status: DISCONTINUED | OUTPATIENT
Start: 2020-04-29 | End: 2020-05-01 | Stop reason: HOSPADM

## 2020-04-29 RX ORDER — THERA TABS 400 MCG
1 TAB ORAL DAILY
Status: DISCONTINUED | OUTPATIENT
Start: 2020-04-30 | End: 2020-05-01 | Stop reason: HOSPADM

## 2020-04-29 RX ORDER — LORAZEPAM 2 MG/ML
3 INJECTION INTRAMUSCULAR
Status: DISCONTINUED | OUTPATIENT
Start: 2020-04-29 | End: 2020-05-01 | Stop reason: HOSPADM

## 2020-04-29 RX ORDER — GABAPENTIN 100 MG/1
300 CAPSULE ORAL 3 TIMES DAILY
Status: DISCONTINUED | OUTPATIENT
Start: 2020-04-29 | End: 2020-05-01 | Stop reason: HOSPADM

## 2020-04-29 RX ORDER — ASPIRIN 325 MG/1
100 TABLET, FILM COATED ORAL DAILY
Status: DISCONTINUED | OUTPATIENT
Start: 2020-04-30 | End: 2020-05-01 | Stop reason: HOSPADM

## 2020-04-29 RX ORDER — GUAIFENESIN 100 MG/5ML
81 LIQUID (ML) ORAL DAILY
Status: DISCONTINUED | OUTPATIENT
Start: 2020-04-30 | End: 2020-05-01 | Stop reason: HOSPADM

## 2020-04-29 RX ORDER — CLOPIDOGREL BISULFATE 75 MG/1
75 TABLET ORAL DAILY
Status: DISCONTINUED | OUTPATIENT
Start: 2020-04-30 | End: 2020-05-01 | Stop reason: HOSPADM

## 2020-04-29 RX ADMIN — GABAPENTIN 300 MG: 300 CAPSULE ORAL at 19:35

## 2020-04-29 RX ADMIN — ACETAMINOPHEN AND CODEINE PHOSPHATE 1 TABLET: 300; 30 TABLET ORAL at 19:36

## 2020-04-29 RX ADMIN — Medication 10 ML: at 22:21

## 2020-04-29 RX ADMIN — SODIUM CHLORIDE 1000 ML: 900 INJECTION, SOLUTION INTRAVENOUS at 15:36

## 2020-04-29 RX ADMIN — ASPIRIN 325 MG ORAL TABLET 325 MG: 325 PILL ORAL at 17:12

## 2020-04-29 RX ADMIN — CLOPIDOGREL BISULFATE 75 MG: 75 TABLET ORAL at 17:12

## 2020-04-29 RX ADMIN — CLOPIDOGREL BISULFATE 525 MG: 300 TABLET, FILM COATED ORAL at 19:35

## 2020-04-29 NOTE — ED TRIAGE NOTES
Pt arrives with wife with c/o confusion. No weakness noted. Pt doesn't know what day it is. Pt anxious and worried about what is going on.

## 2020-04-29 NOTE — ED PROVIDER NOTES
[address] [facility]  EMERGENCY DEPARTMENT HISTORY AND PHYSICAL EXAM       Date: 4/29/2020   Patient Name: Jose David Grossman. YOB: 1965  Medical Record Number: [id number]    HISTORY OF PRESENTING ILLNESS:     Jose David Hernández is a 53 y.o. male presenting with the noted PMH to the ED c/o amnesia. Patient states that started today. He states that he cannot remember this morning. He denies any unusual pain. He states he was a  and was placed on leave in 2000 for medical leave for neuropathy. He states it involves the right side of his head and his right side of his body. He states it is the usual typical pain that he feels. He states he takes T3's and Neurontin for it. No other pains. No chest pain or abdominal pain. No urine or bowel changes. No cough or cold symptoms. No fevers. Denies any arm or leg numbness or tingling outside of his usual neuropathy. Denies any injury. rest of 10 systems reviewed and negative. He states he was at [employer] when it happened.     Primary Care Provider: SERA Whyte   Specialist:    Past Medical History:   Past Medical History:   Diagnosis Date    Asthma     Benign prostatic hyperplasia with lower urinary tract symptoms     Benign prostatic hyperplasia with urinary obstruction     Cervicalgia     Chronic pain     Elevated PSA     Flank pain     Gout     Head ache     Hearing loss     Hypertrophy of prostate with urinary obstruction and other lower urinary tract symptoms (LUTS)     Kidney stones     Lumbago     Neuralgia     Testicular abnormality     Urge incontinence     Urinary frequency         Past Surgical History:   Past Surgical History:   Procedure Laterality Date    HX HERNIA REPAIR      HX TONSILLECTOMY      HX UROLOGICAL  8/27/15    PNBx-TRUS Vol 54 cc's, Benign, Dr. Matthew Desir History:   Social History     Tobacco Use    Smoking status: Former Smoker    Smokeless tobacco: Never Used   Substance Use Topics    Alcohol use: No     Frequency: Never    Drug use: No        Allergies: Allergies   Allergen Reactions    Bee Venom Protein (Honey Bee) Swelling        REVIEW OF SYSTEMS:  Review of Systems      PHYSICAL EXAM:  Vitals:    04/29/20 1459 04/29/20 1530   BP: (!) 143/106 130/89   Pulse: 100 97   Resp: 20 15   Temp: 98.8 °F (37.1 °C)    SpO2: 98% 99%   Weight: 113.4 kg (250 lb)    Height: 6' (1.829 m)        Physical Exam   Vital signs reviewed. Alert oriented x 3 in NAD. HEENT: normocephalic atraumatic. Eyes are PERRLA EOMI. Conjunctiva normal.    External ears and nose normal.    Neck: normal external exam. No midline neck or back TTP. Lungs are clear to ascultation bilaterally. normal effort  Heart is regular rate and rhythm with no murmurs. Abdomen soft and nontender. No rebound rigidity or guarding. Extremities: Moves all 4 extremities and no distress. Full range of motion. 2+ pulses and BCR in all 4 extremities. Neuro: Normal gait. 5 out of 5 strength in all 4 extremities. No facial droop. No slurred speech. Normal finger-to-nose. No pronator drift. Skin examination: intact. no rashes. No petechia or purpura.       Medications   aspirin tablet 325 mg (has no administration in time range)   clopidogreL (PLAVIX) tablet 75 mg (has no administration in time range)   sodium chloride 0.9 % bolus infusion 1,000 mL (1,000 mL IntraVENous New Bag 4/29/20 1536)       RESULTS:    Labs -   Labs Reviewed   CBC WITH AUTOMATED DIFF - Abnormal; Notable for the following components:       Result Value    RBC 4.63 (*)     All other components within normal limits   METABOLIC PANEL, COMPREHENSIVE - Abnormal; Notable for the following components:    Glucose 120 (*)     All other components within normal limits   GLUCOSE, POC - Abnormal; Notable for the following components:    Glucose (POC) 138 (*)     All other components within normal limits   MAGNESIUM   TROPONIN I   PROTHROMBIN TIME + INR   URINALYSIS W/ RFLX MICROSCOPIC       Radiologic Studies -  Ct Head Wo Cont    Result Date: 4/29/2020  EXAM: CT head INDICATION: Altered with a status. COMPARISON: None. TECHNIQUE: Axial CT imaging of the head was performed without intravenous contrast. Standard multiplanar coronal and sagittal reformatted images were obtained and are included in interpretation. One or more dose reduction techniques were used on this CT: automated exposure control, adjustment of the mAs and/or kVp according to patient size, and iterative reconstruction techniques. The specific techniques used on this CT exam have been documented in the patient's electronic medical record. Digital Imaging and Communications in Medicine (DICOM) format image data are available to nonaffiliated external healthcare facilities or entities on a secure, media free, reciprocally searchable basis with patient authorization for at least a 12-month period after this study. _______________ FINDINGS: BRAIN AND POSTERIOR FOSSA: The sulci, folia, ventricles and basal cisterns are within normal limits for the patient?s age. There is no intracranial hemorrhage, mass effect, or midline shift. There are no areas of abnormal parenchymal attenuation. EXTRA-AXIAL SPACES AND MENINGES: There are no abnormal extra-axial fluid collections. CALVARIUM: Intact. SINUSES: Clear. OTHER: None. _______________     IMPRESSION: No acute intracranial abnormalities. Xr Chest Port    Result Date: 4/29/2020  EXAM: XR CHEST PORT CLINICAL INDICATION/HISTORY: AMS -Additional: None COMPARISON: 2/17/18 TECHNIQUE: Portable frontal view of the chest _______________ FINDINGS: SUPPORT DEVICES: None. HEART AND MEDIASTINUM: Cardiomediastinal silhouette within normal limits. LUNGS AND PLEURAL SPACES: No dense consolidation, large effusion or pneumothorax. _______________     IMPRESSION: No acute cardiopulmonary abnormality.       EKG interpretation:   Done at 1510 read by myself at normal sinus rhythm at 100 bpm.  No ST elevation. Normal axis. MEDICAL DECISION MAKING    Patient with a low stroke score. No LVO suspicion. Not a TPA or thrombectomy candidate. Aspirin Plavix given. Will admit patient for further work-up and evaluation. No fevers or meningitis. No intracranial bleed or skull fracture. Patient agrees with plan.    1600. Spoke with wife over the phone. Patient was confused at 1 PM at the [facility]. They called her into the [facility]. She states initially was acting confused but then got better. She left around 115. And then she had called back saying he is confused again. And came back noticing that he was confused. Brought him here to the ED to be checked out.    1620. Pt reassessed. Spoke with Dr. Azul Cheek, teleneurology, at bedside- updated her on wife's additional information. 1640. Spoke with Dr. Azul Cheek. Recommends admission for MRI/MRA, echo and TIA workup. Not a tpa or thrombectomy candidate. Low stroke score. [address] [place], [place] [address]ist. Dr. Juvenal Bowens. 1652. Patient reassessed. Repeat neuro exam performed. Unchanged. Agrees with admission. 1700. Dr. Juvenal Bowens accepts patient for admission and further evaluation and care. 1915. Patient reassessed. Requesting his T3 and Neurontin. Patient currently in NAD.    1930. Spoke with Dr. Talisha Marino. Still awaiting for admission orders. Diagnosis   Clinical Impression:   1. TIA (transient ischemic attack)    amnesia possible transient global amnesia    history of neuropathy       Admitted in stable and improved condition. This chart was completed using Dragon, a dictation transcription service. Errors may have resulted from using this device. Critical Care Time:  The services I provided to this patient were to treat and/or prevent clinically significant deterioration that could result in the failure of one or more body systems and/or organ systems.     Services included the following:  -reviewing nursing notes and old charts  -vital sign assessments  -direct patient care  -medication orders and management  -interpreting and reviewing diagnostic studies/labs  -re-evaluations  -documentation time    Aggregate critical care time was 36 minutes, which includes only time during which I was engaged in work directly related to the patient's care as described above, whether I was at bedside or elsewhere in the Emergency Department. It did not include time spent performing other reported procedures or the services of residents, students, nurses, or advance practice providers. for code stroke activation and repeat neuro exams.

## 2020-04-30 ENCOUNTER — APPOINTMENT (OUTPATIENT)
Dept: MRI IMAGING | Age: 55
DRG: 072 | End: 2020-04-30
Attending: HOSPITALIST
Payer: MEDICARE

## 2020-04-30 ENCOUNTER — APPOINTMENT (OUTPATIENT)
Dept: NON INVASIVE DIAGNOSTICS | Age: 55
DRG: 072 | End: 2020-04-30
Attending: HOSPITALIST
Payer: MEDICARE

## 2020-04-30 PROBLEM — F10.20 ALCOHOLISM (HCC): Status: ACTIVE | Noted: 2020-04-30

## 2020-04-30 LAB
ANION GAP SERPL CALC-SCNC: 4 MMOL/L (ref 3–18)
BASOPHILS # BLD: 0 K/UL (ref 0–0.1)
BASOPHILS NFR BLD: 1 % (ref 0–2)
BUN SERPL-MCNC: 15 MG/DL (ref 7–18)
BUN/CREAT SERPL: 15 (ref 12–20)
CALCIUM SERPL-MCNC: 8.5 MG/DL (ref 8.5–10.1)
CHLORIDE SERPL-SCNC: 108 MMOL/L (ref 100–111)
CO2 SERPL-SCNC: 27 MMOL/L (ref 21–32)
CREAT SERPL-MCNC: 0.98 MG/DL (ref 0.6–1.3)
DIFFERENTIAL METHOD BLD: ABNORMAL
EOSINOPHIL # BLD: 0.2 K/UL (ref 0–0.4)
EOSINOPHIL NFR BLD: 3 % (ref 0–5)
ERYTHROCYTE [DISTWIDTH] IN BLOOD BY AUTOMATED COUNT: 13.7 % (ref 11.6–14.5)
GLUCOSE BLD STRIP.AUTO-MCNC: 128 MG/DL (ref 70–110)
GLUCOSE SERPL-MCNC: 94 MG/DL (ref 74–99)
HCT VFR BLD AUTO: 41.8 % (ref 36–48)
HGB BLD-MCNC: 14.2 G/DL (ref 13–16)
LYMPHOCYTES # BLD: 2.1 K/UL (ref 0.9–3.6)
LYMPHOCYTES NFR BLD: 39 % (ref 21–52)
MCH RBC QN AUTO: 32.6 PG (ref 24–34)
MCHC RBC AUTO-ENTMCNC: 34 G/DL (ref 31–37)
MCV RBC AUTO: 96.1 FL (ref 74–97)
MONOCYTES # BLD: 0.4 K/UL (ref 0.05–1.2)
MONOCYTES NFR BLD: 7 % (ref 3–10)
NEUTS SEG # BLD: 2.7 K/UL (ref 1.8–8)
NEUTS SEG NFR BLD: 50 % (ref 40–73)
PLATELET # BLD AUTO: 219 K/UL (ref 135–420)
PMV BLD AUTO: 9.9 FL (ref 9.2–11.8)
POTASSIUM SERPL-SCNC: 4.2 MMOL/L (ref 3.5–5.5)
RBC # BLD AUTO: 4.35 M/UL (ref 4.7–5.5)
SODIUM SERPL-SCNC: 139 MMOL/L (ref 136–145)
WBC # BLD AUTO: 5.3 K/UL (ref 4.6–13.2)

## 2020-04-30 PROCEDURE — 82962 GLUCOSE BLOOD TEST: CPT

## 2020-04-30 PROCEDURE — 65270000029 HC RM PRIVATE

## 2020-04-30 PROCEDURE — A9575 INJ GADOTERATE MEGLUMI 0.1ML: HCPCS | Performed by: HOSPITALIST

## 2020-04-30 PROCEDURE — 94761 N-INVAS EAR/PLS OXIMETRY MLT: CPT

## 2020-04-30 PROCEDURE — 70544 MR ANGIOGRAPHY HEAD W/O DYE: CPT

## 2020-04-30 PROCEDURE — 74011250637 HC RX REV CODE- 250/637: Performed by: INTERNAL MEDICINE

## 2020-04-30 PROCEDURE — 77030021566 MRA NECK W WO CONT

## 2020-04-30 PROCEDURE — 74011250637 HC RX REV CODE- 250/637: Performed by: HOSPITALIST

## 2020-04-30 PROCEDURE — 74011000250 HC RX REV CODE- 250: Performed by: HOSPITALIST

## 2020-04-30 PROCEDURE — 74011250636 HC RX REV CODE- 250/636: Performed by: HOSPITALIST

## 2020-04-30 PROCEDURE — 36415 COLL VENOUS BLD VENIPUNCTURE: CPT

## 2020-04-30 PROCEDURE — 70551 MRI BRAIN STEM W/O DYE: CPT

## 2020-04-30 PROCEDURE — 80048 BASIC METABOLIC PNL TOTAL CA: CPT

## 2020-04-30 PROCEDURE — 85025 COMPLETE CBC W/AUTO DIFF WBC: CPT

## 2020-04-30 PROCEDURE — 94640 AIRWAY INHALATION TREATMENT: CPT

## 2020-04-30 PROCEDURE — C8929 TTE W OR WO FOL WCON,DOPPLER: HCPCS

## 2020-04-30 PROCEDURE — 77030040361 HC SLV COMPR DVT MDII -B

## 2020-04-30 RX ORDER — GADOTERATE MEGLUMINE 376.9 MG/ML
20 INJECTION INTRAVENOUS
Status: COMPLETED | OUTPATIENT
Start: 2020-04-30 | End: 2020-04-30

## 2020-04-30 RX ORDER — ZOLPIDEM TARTRATE 5 MG/1
5 TABLET ORAL
Status: DISCONTINUED | OUTPATIENT
Start: 2020-04-30 | End: 2020-05-01 | Stop reason: HOSPADM

## 2020-04-30 RX ORDER — ZOLPIDEM TARTRATE 5 MG/1
TABLET ORAL
Status: DISPENSED
Start: 2020-04-30 | End: 2020-05-01

## 2020-04-30 RX ORDER — ARFORMOTEROL TARTRATE 15 UG/2ML
15 SOLUTION RESPIRATORY (INHALATION)
Status: DISCONTINUED | OUTPATIENT
Start: 2020-04-30 | End: 2020-05-01 | Stop reason: HOSPADM

## 2020-04-30 RX ORDER — LANOLIN ALCOHOL/MO/W.PET/CERES
12 CREAM (GRAM) TOPICAL
Status: DISCONTINUED | OUTPATIENT
Start: 2020-04-30 | End: 2020-05-01 | Stop reason: HOSPADM

## 2020-04-30 RX ORDER — BUDESONIDE 0.5 MG/2ML
250 INHALANT ORAL
Status: DISCONTINUED | OUTPATIENT
Start: 2020-04-30 | End: 2020-05-01

## 2020-04-30 RX ADMIN — ARFORMOTEROL TARTRATE 15 MCG: 15 SOLUTION RESPIRATORY (INHALATION) at 21:05

## 2020-04-30 RX ADMIN — ACETAMINOPHEN AND CODEINE PHOSPHATE 1 TABLET: 300; 30 TABLET ORAL at 00:30

## 2020-04-30 RX ADMIN — GADOTERATE MEGLUMINE 20 ML: 376.9 INJECTION INTRAVENOUS at 11:45

## 2020-04-30 RX ADMIN — GABAPENTIN 300 MG: 300 CAPSULE ORAL at 15:59

## 2020-04-30 RX ADMIN — BUDESONIDE 250 MCG: 0.5 INHALANT RESPIRATORY (INHALATION) at 21:05

## 2020-04-30 RX ADMIN — Medication 100 MG: at 09:31

## 2020-04-30 RX ADMIN — GABAPENTIN 300 MG: 300 CAPSULE ORAL at 09:31

## 2020-04-30 RX ADMIN — ZOLPIDEM TARTRATE 5 MG: 5 TABLET ORAL at 21:51

## 2020-04-30 RX ADMIN — BUDESONIDE: 0.5 INHALANT RESPIRATORY (INHALATION) at 09:15

## 2020-04-30 RX ADMIN — TAMSULOSIN HYDROCHLORIDE 0.4 MG: 0.4 CAPSULE ORAL at 09:31

## 2020-04-30 RX ADMIN — Medication 10 ML: at 05:52

## 2020-04-30 RX ADMIN — LORAZEPAM 2 MG: 2 INJECTION, SOLUTION INTRAMUSCULAR; INTRAVENOUS at 10:04

## 2020-04-30 RX ADMIN — GABAPENTIN 300 MG: 300 CAPSULE ORAL at 21:50

## 2020-04-30 RX ADMIN — CLOPIDOGREL BISULFATE 75 MG: 75 TABLET ORAL at 09:31

## 2020-04-30 RX ADMIN — ARFORMOTEROL TARTRATE 15 MCG: 15 SOLUTION RESPIRATORY (INHALATION) at 09:14

## 2020-04-30 RX ADMIN — ASPIRIN 81 MG 81 MG: 81 TABLET ORAL at 09:31
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
## 2020-04-30 NOTE — PROGRESS NOTES
Received patient from cammie GUPTA. Pt awake and in bed. Denies pain, no distress noted. Bed locked in lowest position. Frequently used items and call light within reach.     1910: pt care handed over to [facility]

## 2020-04-30 NOTE — H&P
History and Physical    Patient: Ana Weston. Sex: male          DOA: 4/29/2020       YOB: 1965      Age:  53 y.o.        LOS:  LOS: 1 day        HPI:     Ana Evans is a 53 y.o. male who presented to the ER for evaluation for bizarre behavior. He is the [place] [employer]. We woke up and doesn't remember how he got to work. He reports that he was making phone calls while he was at work and was questioning people about things that didn't seem to make sense. As the day progressed people became concerned and his wife came and found him. He does not remember details around what he was doing. He did not have seizure or syncope. He has not had previous episodes similar to this but he does report that he has had times when he wakes up at night and is confused and does not know where he is. His mentation is clear now. There is concern for potential CVA vs Temporary Global Amnesia and he will be admitted for ongoing management. 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 Past Medical History:   Diagnosis Date    Asthma     Benign prostatic hyperplasia with lower urinary tract symptoms     Benign prostatic hyperplasia with urinary obstruction     Cervicalgia     Chronic pain     Elevated PSA     Flank pain     Gout     Head ache     Hearing loss     Hypertrophy of prostate with urinary obstruction and other lower urinary tract symptoms (LUTS)     Kidney stones     Lumbago     Neuralgia     Testicular abnormality     Urge incontinence     Urinary frequency        Social History:   Tobacco use:  Patient smoked in the past, not now   Alcohol use:  Patient uses alcohol excessively 6-8 drinks of whiskey per night.    Occupation:  Patient is a  and a retired     Family History:   Multiple family members with HTN    Review of Systems    Constitutional:  No fever or weight loss  HEENT:  No headache or visual changes  Cardiovascular:  No chest pain or diaphoresis  Respiratory:  No coughing, wheezing, or shortness of breath. GI:  No nausea or vomitting. No diarrhea  :  No hematuria or dysuria  Skin:  No rashes or moles  Neuro: Altered mental status as above,no seizures or syncope  Hematological:  No bruising or bleeding  Endocrine:  No diabetes or thyroid disease    Physical Exam:      Visit Vitals  BP (!) 137/98   Pulse 61   Temp 97.7 °F (36.5 °C)   Resp 18   Ht 6' (1.829 m)   Wt 113.4 kg (250 lb)   SpO2 98%   BMI 33.91 kg/m²       Physical Exam:    Gen:  No distress, alert  HEENT:  Normal cephalic atraumatic, extra-occular movements are intact. Neck:  Supple, No JVD  Lungs:  Clear bilaterally, no wheeze, no rales, normal effort  Heart:  Regular Rate and Rhythm, normal S1 and S2, no edema  Abdomen:  Soft, non tender, normal bowel sounds, no guarding.   Extremities:  Well perfused, no cyanosis or edema  Neurological:  Awake and alert, CN's are intact, normal strength throughout extremities  Skin:  No rashes or moles  Mental Status:  Normal thought process, does not appear anxious    Laboratory Studies:    BMP:   Lab Results   Component Value Date/Time     04/30/2020 03:50 AM    K 4.2 04/30/2020 03:50 AM     04/30/2020 03:50 AM    CO2 27 04/30/2020 03:50 AM    AGAP 4 04/30/2020 03:50 AM    GLU 94 04/30/2020 03:50 AM    BUN 15 04/30/2020 03:50 AM    CREA 0.98 04/30/2020 03:50 AM    GFRAA >60 04/30/2020 03:50 AM    GFRNA >60 04/30/2020 03:50 AM     CBC:   Lab Results   Component Value Date/Time    WBC 5.3 04/30/2020 03:50 AM    HGB 14.2 04/30/2020 03:50 AM    HCT 41.8 04/30/2020 03:50 AM     04/30/2020 03:50 AM       Assessment/Plan     Principal Problem:    CVA (cerebral vascular accident) ([facility] (4/29/2020)    Active Problems:    Alcoholism ([facility] (4/30/2020)      Essential hypertension (5/13/2019)      Asthma (4/29/2020)        PLAN:    MRI brain   MRA head and neck  Plavix  ASA  BP control  Neurology consult  Alcohol withdrawal protocol

## 2020-04-30 NOTE — PROGRESS NOTES
NUTRITION INITIAL ASSESSMENT/PLAN OF CARE      RECOMMENDATIONS:   1. Cardiac Diet  2. Monitor labs, weight and PO intake  3. RD to follow     GOALS:   1. PO intake meets >75% of protein/calorie needs by 5/7      ASSESSMENT:   Wt status is classified as obese per Body mass index is 33.91 kg/m². Adequate PO intake. Labs noted. Nutrition recommendations listed. RD to follow. Nutrition Diagnoses:   None at this time       SUBJECTIVE/OBJECTIVE:    Pt admitted for CVA. Pt seen in room this afternoon at lunch; Observed >75% of meal consumed. Reports having a good appetite and consuming 2-3 meals per day. NKFA or problems chewing/swallowing. Stated he has been intentionally losing weight (~50 lb now) and off his statin medications d/t diet and activity changes. Discussed recommendations for a heart healthy diet (low Na and sat fat); handouts provided. Will continue to monitor. Information Obtained from:    [x] Chart Review   [x] Patient   [] Family/Caregiver   [] Nurse/Physician   [] Interdisciplinary Meeting/Rounds      Diet: Regular Diet   Medications: [x] Reviewed  Plavix, Tricor, Folvite, Neurontin, MVI, Vit B1    Allergies: [x] Reviewed   Encounter Diagnoses     ICD-10-CM ICD-9-CM   1.  TIA (transient ischemic attack) G45.9 435.9     Past Medical History:   Diagnosis Date    Asthma     Benign prostatic hyperplasia with lower urinary tract symptoms     Benign prostatic hyperplasia with urinary obstruction     Cervicalgia     Chronic pain     Elevated PSA     Flank pain     Gout     Head ache     Hearing loss     Hypertrophy of prostate with urinary obstruction and other lower urinary tract symptoms (LUTS)     Kidney stones     Lumbago     Neuralgia     Testicular abnormality     Urge incontinence     Urinary frequency       Labs:    Lab Results   Component Value Date/Time    Sodium 139 04/30/2020 03:50 AM    Potassium 4.2 04/30/2020 03:50 AM    Chloride 108 04/30/2020 03:50 AM    CO2 27 04/30/2020 03:50 AM    Anion gap 4 04/30/2020 03:50 AM    Glucose 94 04/30/2020 03:50 AM    BUN 15 04/30/2020 03:50 AM    Creatinine 0.98 04/30/2020 03:50 AM    Calcium 8.5 04/30/2020 03:50 AM    Magnesium 2.0 04/29/2020 03:20 PM    Albumin 4.2 04/29/2020 03:20 PM     Lab Results   Component Value Date/Time    GLU 94 04/30/2020 03:50 AM     (H) 04/29/2020 03:20 PM    GLUCPOC 128 (H) 04/30/2020 07:49 AM    GLUCPOC 89 04/29/2020 10:52 PM    GLUCPOC 138 (H) 04/29/2020 03:12 PM     Lab Results   Component Value Date/Time    Cholesterol, total 125 07/01/2019 09:10 AM    HDL Cholesterol 23 (L) 07/01/2019 09:10 AM    LDL, calculated 88 07/01/2019 09:10 AM    VLDL, calculated 14 07/01/2019 09:10 AM    Triglyceride 71 07/01/2019 09:10 AM    CHOL/HDL Ratio 7.4 (H) 02/04/2019 08:04 AM     Anthropometrics: BMI (calculated): 33.9  Last 3 Recorded Weights in this Encounter    04/29/20 1459 04/30/20 0936 04/30/20 0943   Weight: 113.4 kg (250 lb) (!) 159.2 kg (351 lb) 113.4 kg (250 lb)      Ht Readings from Last 1 Encounters:   04/30/20 6' (1.829 m)       Documented Weight History:  Weight Metrics 4/30/2020 4/29/2020 3/12/2020 3/9/2020 2/20/2020 2/19/2020 12/4/2019   Weight 250 lb - 225 lb 6.4 oz 227 lb 6.4 oz 230 lb 224 lb 224 lb 12.8 oz   BMI - 33.91 kg/m2 30.57 kg/m2 30.84 kg/m2 31.19 kg/m2 30.38 kg/m2 30.49 kg/m2       No data found. IBW: 178 lb %IBW: 140% UBW: 230 lb recently  [x] Weight Loss [] Weight Gain  [] Weight Stable    Estimated Nutrition Needs: [x] MSJ x 1.2 [x] Other: 20 kcal/kg  Calories: 4894-5231 kcal Based on:   [x] Actual BW    Protein:    g Based on:   [x] Actual BW x 0.8-1.0 gm/kg    Fluid:       1 mL/kcal     [x] No Cultural, Mosque or ethnic dietary need identified.     [] Cultural, Mosque and ethnic food preferences identified and addressed     Wt Status:  [] Normal (18.6 - 24.9) [] Underweight (<18.5) [] Overweight (25 - 29.9) [x] Mild Obesity (30 - 34.9)  [] Moderate Obesity (35 - 39. 9) [] Morbid Obesity (40+)       Nutrition Problems Identified:   [] Suboptimal PO intake   [] Food Allergies  [] Difficulty chewing/swallowing/poor dentition  [] Constipation/Diarrhea   [] Nausea/Vomiting   [x] None  [] Other:     Plan:   [x] Therapeutic Diet  []  Obtained/adjusted food preferences/tolerances and/or snacks options   []  Supplements added   [] Occupational therapy following for feeding techniques  []  HS snack added   []  Modify diet texture   []  Modify diet for food allergies   [x]  Educate patient   []  Assist with menu selection   [x]  Monitor PO intake on meal rounds   [x]  Continue inpatient monitoring and intervention   [x]  Participated in discharge planning/Interdisciplinary rounds/Team meetings   []  Other:     Education Needs:   [] Not appropriate for teaching at this time due to:   [x] Identified and addressed    Nutrition Monitoring and Evaluation:  [x] Continue ongoing monitoring and intervention  [] Other    Cleophus Ahr

## 2020-04-30 NOTE — ED NOTES
TRANSFER - ED to INPATIENT REPORT:    Verbal report given to Geno(name) on Andree Maradiaga.  being transferred to (unit) for routine progression of care       Report consisted of patients Situation, Background, Assessment and   Recommendations(SBAR). SBAR report made available to receiving floor on this patient being transferred to [address] [place] [address]  for routine progression of care       Admitting diagnosis CVA (cerebral vascular accident) [facility]) [I63.9]    Information from the following report(s) SBAR was made available to receiving floor. Lines:   Peripheral IV 04/29/20 Right Antecubital (Active)   Site Assessment Clean, dry, & intact 4/29/2020  3:19 PM   Phlebitis Assessment 0 4/29/2020  3:19 PM   Infiltration Assessment 0 4/29/2020  3:19 PM   Dressing Status Clean, dry, & intact 4/29/2020  3:19 PM   Hub Color/Line Status Green 4/29/2020  3:19 PM            Medication list confirmed with patient    Opportunity for questions and clarification was provided.       Patient is oriented to time, place, person and situation Last NIH 0  Patient is  continent and ambulatory without assist     Valuables transported with patient     Patient transported with:   Tech    MAP (Monitor): 102 =Monitored (most recent)  Vitals w/ MEWS Score (last day)     Date/Time MEWS Score Pulse Resp Temp BP Level of Consciousness SpO2    04/29/20 2115    71  17    (!) 140/91    98 %    04/29/20 2100    72  18    132/86    98 %    04/29/20 2045    74  17    139/83    98 %    04/29/20 2030    70  17    141/82    98 %    04/29/20 2015    71  18    130/84    98 %    04/29/20 2000    70  14    139/85    98 %    04/29/20 1930    66  17    155/90    99 %    04/29/20 1915    68  16    (!) 149/97    100 %    04/29/20 1800    72  19    (!) 141/92    99 %    04/29/20 1730    74      (!) 149/98    99 %    04/29/20 1715    76      (!) 144/92    99 %    04/29/20 1700    75      143/90    99 %    04/29/20 1645   87      144/89    99 %    04/29/20 1630    86      (!) 146/93    99 %    04/29/20 1625          (!) 151/94        04/29/20 1530    97  15    130/89    99 %    04/29/20 1459  1  100  20  98.8 °F (37.1 °C)  (!) 143/106  Alert  98 %

## 2020-04-30 NOTE — PROGRESS NOTES
NUTRITION  Patient/Family Education Record    FACTORS THAT MAY INFLUENCE PATIENTS ABILITY TO LEARN:   []   Language barrier    []   Cultural needs   []   Motivation    []   Cognitive limitation    []   Physical   []   Education   []   Physiological factors   []   Hearing/vision/speaking impairment   []   Orthodoxy beliefs    []   Financial limitations    []  Other:   [x]   No barriers limiting ability to learn     Person Instructed:   [x]   Patient   []   Family   []  Other     Preference for Learning:   [x]   Verbal   [x]   Written   []  Demonstration     Patient educated on:   [x] Cardiac/heart healthy diet  [x] 2gm Sodium diet  [] Vitamin K regulated diet (coumadin)  [x] Weight loss/portion control  [] High protein  [] Other:    Goal:  Patient demonstrated understanding of modified diet by teach back    Outcome:   [x]  Patient verbalized understanding of education and willing to comply with recommendations.   []  Patient declined education  []  Patient needs follow up education; scheduled date for follow up:  [x]  Written information provided  [x]  RD contact information provided    Author Britta

## 2020-04-30 NOTE — PROGRESS NOTES
TRANSFER - IN REPORT:    Verbal report received from Eric Hi RN(name) on Javi Shaw.  being received from ED(unit) for routine progression of care      Report consisted of patients Situation, Background, Assessment and   Recommendations(SBAR). Information from the following report(s) SBAR, Kardex, ED Summary, Intake/Output, MAR and Recent Results was reviewed with the receiving nurse. Opportunity for questions and clarification was provided. Assessment completed upon patients arrival to unit and care assumed. 2300 Dual NIH with Eric Hi, scoring 0. Denies pain or discomfort at present. Bed locked in lowest position. Instructed on use of call light to use for assistance and needs. 0030 Tylenol #3 1 tab for complaint of anterior headache with pain level 7/10.      0200 Refuse influenza vaccine. 0400 Patient appears anxious and qusitive about Plan of care this morning and if he able to be discharged. 0700 Bedside and Verbal shift change report given to Lit Rogel (oncoming nurse) by Herve Herrera RN (offgoing nurse). Report given with SBAR, Kardex, Intake/Output, MAR and Recent Results.

## 2020-04-30 NOTE — PROGRESS NOTES
Problem: Pain  Goal: *Control of Pain  Outcome: Progressing Towards Goal     Problem: Falls - Risk of  Goal: *Absence of Falls  Description: Document Esdras Fall Risk and appropriate interventions in the flowsheet. Outcome: Progressing Towards Goal  Note: Fall Risk Interventions:       Mentation Interventions: Reorient patient    Medication Interventions: Assess postural VS orthostatic hypotension                   Problem: Falls - Risk of  Goal: *Absence of Falls  Description: Document Esdras Fall Risk and appropriate interventions in the flowsheet.   Outcome: Progressing Towards Goal  Note: Fall Risk Interventions:       Mentation Interventions: Reorient patient    Medication Interventions: Assess postural VS orthostatic hypotension                   Problem: TIA/CVA Stroke: 0-24 hours  Goal: Off Pathway (Use only if patient is Off Pathway)  Outcome: Progressing Towards Goal     Problem: TIA/CVA Stroke: 0-24 hours  Goal: Nutrition/Diet  Outcome: Progressing Towards Goal

## 2020-05-01 VITALS
HEART RATE: 66 BPM | RESPIRATION RATE: 18 BRPM | DIASTOLIC BLOOD PRESSURE: 99 MMHG | HEIGHT: 72 IN | BODY MASS INDEX: 33.86 KG/M2 | WEIGHT: 250 LBS | OXYGEN SATURATION: 99 % | TEMPERATURE: 98.1 F | SYSTOLIC BLOOD PRESSURE: 137 MMHG

## 2020-05-01 LAB
ATRIAL RATE: 100 BPM
CALCULATED P AXIS, ECG09: 60 DEGREES
CALCULATED R AXIS, ECG10: 50 DEGREES
CALCULATED T AXIS, ECG11: 58 DEGREES
DIAGNOSIS, 93000: NORMAL
ECHO AO ASC DIAM: 4.08 CM
ECHO AO ROOT DIAM: 3.86 CM
ECHO IVC SNIFF: 2.48 CM
ECHO LA MAJOR AXIS: 3.17 CM
ECHO LA TO AORTIC ROOT RATIO: 0.82
ECHO LV EDV A2C: 134.1 ML
ECHO LV EDV A4C: 166 ML
ECHO LV EDV BP: 152.5 ML (ref 67–155)
ECHO LV EDV INDEX A4C: 70.9 ML/M2
ECHO LV EDV INDEX BP: 65.1 ML/M2
ECHO LV EDV NDEX A2C: 57.3 ML/M2
ECHO LV EJECTION FRACTION A2C: 60 %
ECHO LV EJECTION FRACTION A4C: 47 %
ECHO LV EJECTION FRACTION BIPLANE: 54.5 % (ref 55–100)
ECHO LV ESV A2C: 54 ML
ECHO LV ESV A4C: 88 ML
ECHO LV ESV BP: 69.4 ML (ref 22–58)
ECHO LV ESV INDEX A2C: 23.1 ML/M2
ECHO LV ESV INDEX A4C: 37.6 ML/M2
ECHO LV ESV INDEX BP: 29.6 ML/M2
ECHO LV INTERNAL DIMENSION DIASTOLIC: 5.15 CM (ref 4.2–5.9)
ECHO LV INTERNAL DIMENSION SYSTOLIC: 4.15 CM
ECHO LV IVSD: 1.4 CM (ref 0.6–1)
ECHO LV MASS 2D: 300.8 G (ref 88–224)
ECHO LV MASS INDEX 2D: 128.4 G/M2 (ref 49–115)
ECHO LV POSTERIOR WALL DIASTOLIC: 1.05 CM (ref 0.6–1)
ECHO LVOT DIAM: 2.34 CM
ECHO LVOT PEAK GRADIENT: 6.4 MMHG
ECHO LVOT PEAK VELOCITY: 126.04 CM/S
ECHO LVOT SV: 108.7 ML
ECHO LVOT VTI: 25.22 CM
ECHO MV A VELOCITY: 75.01 CM/S
ECHO MV E DECELERATION TIME (DT): 211.1 MS
ECHO MV E VELOCITY: 83.48 CM/S
ECHO MV E/A RATIO: 1.11
ECHO RA MINOR AXIS: 3.99 CM
ECHO TV REGURGITANT MAX VELOCITY: 240.9 CM/S
ECHO TV REGURGITANT PEAK GRADIENT: 23.2 MMHG
LVFS 2D: 19.39 %
LVOT MG: 4.12 MMHG
LVOT MV: 0.97 CM/S
MV DEC SLOPE: 3.95
P-R INTERVAL, ECG05: 166 MS
Q-T INTERVAL, ECG07: 330 MS
QRS DURATION, ECG06: 78 MS
QTC CALCULATION (BEZET), ECG08: 425 MS
VENTRICULAR RATE, ECG03: 100 BPM

## 2020-05-01 PROCEDURE — 94640 AIRWAY INHALATION TREATMENT: CPT

## 2020-05-01 PROCEDURE — 94761 N-INVAS EAR/PLS OXIMETRY MLT: CPT

## 2020-05-01 PROCEDURE — 74011000250 HC RX REV CODE- 250: Performed by: HOSPITALIST

## 2020-05-01 PROCEDURE — 74011250637 HC RX REV CODE- 250/637: Performed by: HOSPITALIST

## 2020-05-01 PROCEDURE — 74011000250 HC RX REV CODE- 250: Performed by: INTERNAL MEDICINE

## 2020-05-01 RX ORDER — BUDESONIDE 0.5 MG/2ML
500 INHALANT ORAL
Status: DISCONTINUED | OUTPATIENT
Start: 2020-05-01 | End: 2020-05-01 | Stop reason: HOSPADM

## 2020-05-01 RX ORDER — FOLIC ACID 1 MG/1
1 TABLET ORAL DAILY
Qty: 30 TAB | Refills: 0 | Status: SHIPPED | OUTPATIENT
Start: 2020-05-02 | End: 2021-02-12

## 2020-05-01 RX ORDER — CLOPIDOGREL BISULFATE 75 MG/1
75 TABLET ORAL DAILY
Qty: 14 TAB | Refills: 0 | Status: SHIPPED | OUTPATIENT
Start: 2020-05-02 | End: 2020-05-22 | Stop reason: ALTCHOICE

## 2020-05-01 RX ORDER — THERA TABS 400 MCG
1 TAB ORAL DAILY
Qty: 30 TAB | Refills: 0 | Status: SHIPPED | OUTPATIENT
Start: 2020-05-02 | End: 2021-02-12

## 2020-05-01 RX ORDER — GUAIFENESIN 100 MG/5ML
81 LIQUID (ML) ORAL DAILY
Qty: 30 TAB | Refills: 11 | Status: SHIPPED | OUTPATIENT
Start: 2020-05-02 | End: 2021-02-12

## 2020-05-01 RX ORDER — ASPIRIN 325 MG/1
100 TABLET, FILM COATED ORAL DAILY
Qty: 30 TAB | Refills: 0 | Status: SHIPPED | OUTPATIENT
Start: 2020-05-02 | End: 2021-02-12

## 2020-05-01 RX ADMIN — ARFORMOTEROL TARTRATE 15 MCG: 15 SOLUTION RESPIRATORY (INHALATION) at 08:23

## 2020-05-01 RX ADMIN — GABAPENTIN 300 MG: 300 CAPSULE ORAL at 08:36

## 2020-05-01 RX ADMIN — BUDESONIDE 500 MCG: 0.5 INHALANT RESPIRATORY (INHALATION) at 08:23

## 2020-05-01 RX ADMIN — ACETAMINOPHEN AND CODEINE PHOSPHATE 1 TABLET: 300; 30 TABLET ORAL at 09:32

## 2020-05-01 RX ADMIN — Medication 1 TABLET: at 09:00

## 2020-05-01 NOTE — PROGRESS NOTES
Problem: Pain  Goal: *Control of Pain  Outcome: Progressing Towards Goal     Problem: Falls - Risk of  Goal: *Absence of Falls  Description: Document Esdras Fall Risk and appropriate interventions in the flowsheet.   Outcome: Progressing Towards Goal  Note: Fall Risk Interventions:       Mentation Interventions: Door open when patient unattended    Medication Interventions: Patient to call before getting OOB                   Problem: TIA/CVA Stroke: 0-24 hours  Goal: Nutrition/Diet  Outcome: Progressing Towards Goal     Problem: TIA/CVA Stroke: 0-24 hours  Goal: Respiratory  Outcome: Progressing Towards Goal

## 2020-05-01 NOTE — DISCHARGE INSTRUCTIONS
DISCHARGE SUMMARY from Nurse    PATIENT INSTRUCTIONS:    After general anesthesia or intravenous sedation, for 24 hours or while taking prescription Narcotics:  · Limit your activities  · Do not drive and operate hazardous machinery  · Do not make important personal or business decisions  · Do  not drink alcoholic beverages  · If you have not urinated within 8 hours after discharge, please contact your surgeon on call. Report the following to your surgeon:  · Excessive pain, swelling, redness or odor of or around the surgical area  · Temperature over 100.5  · Nausea and vomiting lasting longer than 4 hours or if unable to take medications  · Any signs of decreased circulation or nerve impairment to extremity: change in color, persistent  numbness, tingling, coldness or increase pain  · Any questions    What to do at Home:  Recommended activity: Activity as tolerated    If you experience any of the following symptoms facial droop, sudden change in vision/balance, or slurred speech, please follow up with 911. *  Please give a list of your current medications to your Primary Care Provider. *  Please update this list whenever your medications are discontinued, doses are      changed, or new medications (including over-the-counter products) are added. *  Please carry medication information at all times in case of emergency situations. These are general instructions for a healthy lifestyle:    No smoking/ No tobacco products/ Avoid exposure to second hand smoke  Surgeon General's Warning:  Quitting smoking now greatly reduces serious risk to your health.     Obesity, smoking, and sedentary lifestyle greatly increases your risk for illness    A healthy diet, regular physical exercise & weight monitoring are important for maintaining a healthy lifestyle    You may be retaining fluid if you have a history of heart failure or if you experience any of the following symptoms:  Weight gain of 3 pounds or more overnight or 5 pounds in a week, increased swelling in our hands or feet or shortness of breath while lying flat in bed. Please call your doctor as soon as you notice any of these symptoms; do not wait until your next office visit. The discharge information has been reviewed with the patient. The patient verbalized understanding. Discharge medications reviewed with the patient and appropriate educational materials and side effects teaching were provided. Patient armband removed and shredded.

## 2020-05-01 NOTE — PROGRESS NOTES
conducted an initial consultation and Spiritual Assessment for Lilli Gunn, who is a 53 y. o.,male. Patients Primary Language is: [place]. According to the patients EMR Spiritism Affiliation is: James Salmeron. The reason the Patient came to the hospital is:   Patient Active Problem List    Diagnosis Date Noted    Alcoholism [facility]) 04/30/2020    CVA (cerebral vascular accident) [facility]) 04/29/2020    Asthma 04/29/2020    Essential hypertension 05/13/2019    Hyperlipidemia 05/13/2019    Cervical radiculopathy 05/13/2019    Insomnia 05/13/2019    Severe obesity (BMI 35.0-39. 9) with comorbidity ([facility] [address] 05/07/2018    Chronic midline low back pain without sciatica 02/05/2018    Advanced care planning/counseling discussion 02/05/2018    Screening for depression 07/24/2017    Benign prostatic hyperplasia with lower urinary tract symptoms 03/24/2016    Lumbar spine pain     Cervicalgia     Urge incontinence     Neuralgia     Hearing loss     Gout         The  provided the following Interventions:  Initiated a relationship of care and support. Explored issues of sanjay, spirituality and/or Cheondoism needs while hospitalized. Listened empathically. Provided chaplaincy education. Provided information about Spiritual Care Services. Offered prayer and assurance of continued prayers on patient's behalf. Chart reviewed. The following outcomes were achieved:  Patient shared some information about their medical narrative and spiritual journey/beliefs. Patient processed feeling about current hospitalization. Patient expressed gratitude for the 's visit. Assessment:  Patient did not indicate any spiritual or Cheondoism issues which require Spiritual Care Services interventions at this time. Patient does not have any Cheondoism/cultural needs that will affect patients preferences in health care.     Plan:  Chaplains will continue to follow and will provide pastoral care on an as needed or requested basis.  recommends bedside caregivers page  on duty if patient shows signs of acute spiritual or emotional distress.     [address] [facility]   Staff [address] [facility]   [phone]

## 2020-05-01 NOTE — PROGRESS NOTES
Chart reviewed. Plan remains home when medically stable. Will cont to follow for any needs. Zara Agarwal RN,ext [phone].

## 2020-05-01 NOTE — PROGRESS NOTES
1036 Report received from Won Harding RN using SBAR and [facility]     2286 Tele-neurology speaking with pt in room. 1200 Pt ambulated in hallway. No complaints at this time. Pt wanting to go home. Awaiting MD to see pt.     8487 Discharge instructions provided to pt. Follow up appointment reviewed. Peripheral iv and telemetry monitor removed. Pt escorted to car to be driven by wife. Pt left in stable condition.

## 2020-05-01 NOTE — PROGRESS NOTES
Progress Note      Patient: Keyonna Brice. Sex: male          DOA: 4/29/2020       YOB: 1965      Age:  53 y.o.        LOS:  LOS: 1 day             CHIEF COMPLAINT:  Altered mental status    Subjective:     Patient is alert  No distress    Objective:      Visit Vitals  BP (!) 137/93   Pulse 66   Temp 98.3 °F (36.8 °C)   Resp 18   Ht 6' (1.829 m)   Wt 113.4 kg (250 lb)   SpO2 97%   BMI 33.91 kg/m²       Physical Exam:  Gen:  No distress, no complaint  Lungs:  Clear bilaterally, no wheeze or rhonchi  Heart:  Regular rate and rhythm, no murmurs or gallops  Abdomen:  Soft, non-tender, normal bowel sounds        Lab/Data Reviewed:  BMP:   Lab Results   Component Value Date/Time     04/30/2020 03:50 AM    K 4.2 04/30/2020 03:50 AM     04/30/2020 03:50 AM    CO2 27 04/30/2020 03:50 AM    AGAP 4 04/30/2020 03:50 AM    GLU 94 04/30/2020 03:50 AM    BUN 15 04/30/2020 03:50 AM    CREA 0.98 04/30/2020 03:50 AM    GFRAA >60 04/30/2020 03:50 AM    GFRNA >60 04/30/2020 03:50 AM     CBC:   Lab Results   Component Value Date/Time    WBC 5.3 04/30/2020 03:50 AM    HGB 14.2 04/30/2020 03:50 AM    HCT 41.8 04/30/2020 03:50 AM     04/30/2020 03:50 AM           Assessment/Plan     Principal Problem:    CVA (cerebral vascular accident) ([address] (4/29/2020)    Active Problems:    Alcoholism ([facility] [address] (4/30/2020)      Essential hypertension (5/13/2019)      Asthma (4/29/2020)        Plan:  MRI brain without significant abnormality  MRA brain and neck unremarkable  BP control  [facility] protocol  Neurology consult tomorrow.

## 2020-05-01 NOTE — PROGRESS NOTES
INTERIM UPDATE - 2144 EST on 4/30/2020    Nursing Staff called to report that Patient is, again, requesting Zolpidem for sleep. Nursing Staff reports that imaging did not show a CVA. Plan:  PRN Melatonin ([employer]) and REDUCED-DOSE PRN Zolpidem 5 mg ([employer]) for sleep aid.

## 2020-05-01 NOTE — PROGRESS NOTES
Assume care of patient sitting in chair after ambulatory in room with steady gait. Alert and oriented X 4. Denies pain or discomfort at present. Bed locked in lowest position. Call light within reach and understand to use for assistance and needs. 2200 Patient ambulating in room with steady gait. 05/01/2020    0020 Patient resting quietly with eyes closed without complaints voiced. 0300 Patient remains asleep without complaint voiced. 0730 Bedside and Verbal shift change report given to [facility] [address], RN (oncoming nurse) by Ria Almodovar RN (offgoing nurse). Report given with SBAR, Kardex, Intake/Output, MAR and Recent Results.

## 2020-05-01 NOTE — PROGRESS NOTES
Noted orders for discharge. No services ordered or indicated. Available as needed. Zara AgarwalRN,ext [phone].       Care Management Interventions  PCP Verified by CM: Yes(SERA Marino)  Last Visit to PCP: 04/16/20  Mode of Transport at Discharge: Self(wife)  Transition of Care Consult (CM Consult): Discharge Planning  Current Support Network: Lives with Spouse, Own Home  Confirm Follow Up Transport: Self  The Plan for Transition of Care is Related to the Following Treatment Goals : resolution of acute symptoms to d/c home  Name of the Patient Representative Who was Provided with a Choice of Provider and Agrees with the Discharge Plan: [address] Provided?: No  Discharge Location  Discharge Placement: Home

## 2020-05-01 NOTE — CONSULTS
Teleneurology Consult    Patient ID  Name:  Bart Rowe. :  1965  MRN:  [id number]  Age:  53 y.o. PCP:  SERA Smalls    Subjective:     Encounter Date:  2020    Referring Physician: Dr. Mojgan Dawson    Chief Complaint   Patient presents with    Altered mental status       History of Present Illness:   Bart Rowe. is a 53 y.o. RH male, part-time  of a [employer] and former retired , developed acute confusion while in [employer] on  AM but can recall driving to work and doing morning routines. While there he was observed by others to be relatively intact and responsive but somewhat confused with regards to his circumstances. Eventually he was brought in to ED after about 6 hours of confusion for amnesia. TeleStroke consult felt he was experiencing TGA and treated with plavix 600 and  pending further testing. Amnesia resolved after about a day and he is able to recall very minor pieces of the day. Of note he has been on a weight loss program and has lost approximately 50 lbs over past 8 months and has regained about 10 pounds in past 2 weeks which he attributes to stress of pandemic and increased alcohol intake (previuosly drank about 2 shots on occasion and now increased to about 6 shots of whiskey nearly daily for past 2 weeks). He does take gabapentin routinely for a chronic pain syndrome but had to taper down on doses due to refill issues sometime in past 2-3 weeks and reports occasional confusion when he awakens at night time to go to bathroom (where he gets lost attempting to find the correct door). He also reports a rather severe headache that occurred after resolution of his amnesia on  associated with throbbing and photophobia (different from his routine headaches).      Current Facility-Administered Medications   Medication Dose Route Frequency Provider Last Rate Last Dose    budesonide (PULMICORT) 500 mcg/2 ml nebulizer suspension  500 mcg Nebulization BID RT Arlyne Bunting, DO   500 mcg at 05/01/20 5729    arformoteroL (BROVANA) neb solution 15 mcg  15 mcg Nebulization BID RT Arcadio Canchola MD   15 mcg at 05/01/20 8792    zolpidem (AMBIEN) tablet 5 mg  5 mg Oral QHS PRN Arlyne Bunting, DO   5 mg at 04/30/20 2151    melatonin tablet 12 mg  12 mg Oral QHS PRN Arlyne Bunting, DO        gabapentin (NEURONTIN) capsule 300 mg  300 mg Oral TID Arcadio Canchola MD   300 mg at 05/01/20 3813    acetaminophen-codeine (TYLENOL #3) per tablet 1 Tab  1 Tab Oral Q4H PRN Arcadio Canchola MD   1 Tab at 05/01/20 0932    fenofibrate nanocrystallized (TRICOR) tablet 145 mg  145 mg Oral DAILY Arcadio Canchola MD        tamsulosin [facility]) capsule 0.4 mg  0.4 mg Oral DAILY Arcadio Canchola MD   0.4 mg at 04/30/20 0178    tiZANidine (ZANAFLEX) tablet 2 mg  2 mg Oral BID PRN Arcadio Canchola MD        aspirin chewable tablet 81 mg  81 mg Oral DAILY Arcadio Canchola MD   81 mg at 04/30/20 1825    clopidogreL (PLAVIX) tablet 75 mg  75 mg Oral DAILY Arcadio Canchola MD   75 mg at 04/30/20 0931    sodium chloride (NS) flush 5-40 mL  5-40 mL IntraVENous Q8H Arcadio Canchola MD   10 mL at 04/30/20 9158    sodium chloride (NS) flush 5-40 mL  5-40 mL IntraVENous PRN Arcadio Canchola MD        LORazepam (ATIVAN) tablet 1 mg  1 mg Oral Q1H PRN Arcadio Canchola MD        Or    LORazepam (ATIVAN) injection 1 mg  1 mg IntraVENous Q1H PRN Arcadio Canchola MD        LORazepam (ATIVAN) tablet 2 mg  2 mg Oral Q1H PRN Arcadio Canchola MD        Or    LORazepam (ATIVAN) injection 2 mg  2 mg IntraVENous Q1H PRN Arcadio Canchola MD   2 mg at 04/30/20 1004    LORazepam (ATIVAN) injection 3 mg  3 mg IntraVENous Q15MIN PRN Arcadio Canchola MD        folic acid (FOLVITE) tablet 1 mg  1 mg Oral DAILY Re Hinojosa MD        thiamine mononitrate (B-1) tablet 100 mg  100 mg Oral DAILY Arcadio Canchola MD   100 mg at 04/30/20 0931    therapeutic multivitamin (THERAGRAN) tablet 1 Tab  1 Tab Oral DAILY Riya Cardoza MD   1 Tab at 05/01/20 0900     Allergies   Allergen Reactions    Bee Venom Protein (Honey Bee) Swelling     Patient Active Problem List   Diagnosis Code    Lumbar spine pain M54.5    Cervicalgia M54.2    Urge incontinence N39.41    Neuralgia M79.2    Hearing loss H91.90    Gout M10.9    Benign prostatic hyperplasia with lower urinary tract symptoms N40.1    Screening for depression Z13.31    Chronic midline low back pain without sciatica M54.5, G89.29    Advanced care planning/counseling discussion Z71.89    Severe obesity (BMI 35.0-39. 9) with comorbidity [facility] E66.01    Essential hypertension I10    Hyperlipidemia E78.5    Cervical radiculopathy M54.12    Insomnia G47.00    CVA (cerebral vascular accident) ([facility] I63.9    Asthma J45.909    Alcoholism ([facility] F10.20     Past Medical History:   Diagnosis Date    Asthma     Benign prostatic hyperplasia with lower urinary tract symptoms     Benign prostatic hyperplasia with urinary obstruction     Cervicalgia     Chronic pain     Elevated PSA     Flank pain     Gout     Head ache     Hearing loss     Hypertrophy of prostate with urinary obstruction and other lower urinary tract symptoms (LUTS)     Kidney stones     Lumbago     Neuralgia     Testicular abnormality     Urge incontinence     Urinary frequency       Past Surgical History:   Procedure Laterality Date    HX HERNIA REPAIR      HX TONSILLECTOMY      HX UROLOGICAL  8/27/15    PNBx-TRUS Vol 54 cc's, Benign, Dr. Yu Maldonado History   Problem Relation Age of Onset    Cancer Mother     No Known Problems Father     Asthma Sister     Stroke Maternal Grandmother       Social History     Socioeconomic History    Marital status:      Spouse name: Not on file    Number of children: Not on file    Years of education: Not on file    Highest education level: Not on file   Tobacco Use    Smoking status: Former Smoker    Smokeless tobacco: Never Used   Substance and Sexual Activity    Alcohol use: No     Frequency: Never    Drug use: No    Sexual activity: Yes     Partners: Female     Birth control/protection: None   Social History Narrative    ** Merged History Encounter **            Review of Systems:  Pertinent items are noted in HPI. No amnesia or memory concerns now and no headache    Objective:     Vitals:    04/30/20 2329 05/01/20 0340 05/01/20 0727 05/01/20 0823   BP: (!) 140/95 149/88 (!) 146/91    Pulse: 69 74 70    Resp: 16 16 16    Temp: 98.4 °F (36.9 °C) 98 °F (36.7 °C) 97.7 °F (36.5 °C)    SpO2: 97% 97% 96% 96%   Weight:       Height:           Physical Exam:    General:  Patient seen via telemedicine video encounter utilizing a tele-presenter. No acute distress. Neck: Supple, nontender, thyroid within normal limits, no JVD, no bruits, no pain with resistance to active range of motion per telepresenter  Heart: Regular rate and rhythm, no murmurs, rub, or gallop. Normal S1S2 per telepresenter  Lungs:  Clear to auscultation bilaterally with equal chest expansion, no cough, no wheeze per telepresenter  Musculoskeletal: No obvious impairment on gross testing    NEUROLOGICAL EXAMINATION:     Mental Status:   Alert and oriented to person, place, and time with recent and remote memory intact. Attention span and concentration are normal.   Speech is fluent with a full fund of knowledge. Cranial Nerves:    II, III, IV, VI:  Visual fields are normal to confrontation. Pupils are equal, round, and reactive to light and accommodation. Extra-ocular movements are full and fluid. No ptosis or nystagmus. V-XII:   Face is symmetric  Normal sensation. The palate rises symmetrically and the tongue protrudes midline. Sternocleidomastoids 5/5.       Motor Examination: Strength 5/5   No involuntary movements, Normal tone    Sensory exam:  Normal throughout to light touch per telepresenter  Coordination:  No dysmetria, No resting or intention tremor    Gait and Station:  Not tested  Reflexes:  Not tested by telepresenter  Labs  No results found for this or any previous visit (from the past 24 hour(s)). Relevant Radiology:   Mra Neck W Wo Cont    Result Date: 4/30/2020  MR angiogram of the cervical vasculature without and with contrast . CLINICAL INDICATION/HISTORY: Altered level of consciousness, headache, TIA versus CVA. COMPARISON:  Correlation brain MRA same day. TECHNIQUE: 3-D post contrast MR angiography of the cervical vasculature was performed during the dynamic infusion of 20 cc of intravenous Dotarem. MIP reconstructions were obtained from the source images. 2 D TOF MRA neck performed without gadolinium _______________ FINDINGS: GREAT VESSEL ORIGINS- common trunk innominate and left common carotid artery, no innominate or subclavian artery stenosis. CAROTID ARTERIES- Both common carotid arteries are patent throughout their cervical course and the carotid bifurcations are clear, 0% stenosis by NASCET criteria. Both internal carotid arteries are also patent throughout their cervical course. VERTERBRAL ARTERIES- right vertebral artery dominance is noted. Both vertebral artery origins are patent. The vertebral arteries are patent throughout their cervical and intracranial segments. Incidental normal variant termination of developmentally small left vertebral artery in the PICA. OTHER: None. IMPRESSION:  1. Unremarkable MR angiogram of the cervical vasculature.     MRI of Brain: No evidence of acute infarct or other acute or significant intracranial  finding. MRI of Neck wwo gato: CAROTID ARTERIES- Both common carotid arteries are patent throughout their  cervical course and the carotid bifurcations are clear, 0% stenosis by NASCET  criteria.   Both internal carotid arteries are also patent throughout their  cervical course.     VERTERBRAL ARTERIES- right vertebral artery dominance is noted. Both vertebral  artery origins are patent. The vertebral arteries are patent throughout their  cervical and intracranial segments. Incidental normal variant termination of  developmentally small left vertebral artery in the PICA.     OTHER: None.     IMPRESSION  IMPRESSION:     1. Unremarkable MR angiogram of the cervical vasculature. Impression:   TGA (likely) in setting of increased alcohol intake as risk factor with a ddx of :   -TIA (Possible) in setting of HTN   -Complex partial Seizure (Possible but least likely ) in  setting of reported post-event atypical headache,  changes on use of routine AED, as well as increased  ETOH intake and recent weight loss   Plan:   Reviewed above ddx with patient. Advised to complete 2 week course of plavix at 75 mg and continue ASA 81 mg over next year. He will need neurology outpatient follow-up in order to obtain an EEG as well. Advised to avoid driving if in any way feels impaired but otherwise may resume normal activities.          Signed By:  Tanya Evans DO     5/1/2020    [facility] TeleNeurology for Inpatient Consultation

## 2020-05-06 ENCOUNTER — VIRTUAL VISIT (OUTPATIENT)
Dept: INTERNAL MEDICINE CLINIC | Age: 55
End: 2020-05-06

## 2020-05-06 DIAGNOSIS — G47.01 INSOMNIA SECONDARY TO CHRONIC PAIN: ICD-10-CM

## 2020-05-06 DIAGNOSIS — G45.4 TGA (TRANSIENT GLOBAL AMNESIA): Primary | ICD-10-CM

## 2020-05-06 DIAGNOSIS — G89.29 INSOMNIA SECONDARY TO CHRONIC PAIN: ICD-10-CM

## 2020-05-06 RX ORDER — AMITRIPTYLINE HYDROCHLORIDE 25 MG/1
25 TABLET, FILM COATED ORAL
Qty: 30 TAB | Refills: 2 | Status: SHIPPED | OUTPATIENT
Start: 2020-05-06 | End: 2020-08-26

## 2020-05-06 NOTE — PROGRESS NOTES
Virtual Video Transitional Care Management Progress Note    Patient: Aidan Faulkner. : 1965  PCP: SERA Jha    Date of admission: 20  Date of discharge: 20    Patient was contacted by Transitional Care Management services within two days after his discharge: No. This encounter and supporting documentation was reviewed if available. Medication reconciliation was performed today (2020). Consent: Aidan Cordova, who was seen by synchronous (real-time) audio-video technology, and/or his healthcare decision maker, is aware that this patient-initiated, Telehealth encounter on 2020 is a billable service, with coverage as determined by his insurance carrier. He is aware that he may receive a bill and has provided verbal consent to proceed: Yes. Assessment/Plan:     Diagnoses and all orders for this visit:    1. TGA (transient global amnesia)  -     DE DISCHARGE MEDS RECONCILED W/ CURRENT OUTPATIENT MED LIST  -     REFERRAL TO NEUROLOGY  - Complete 14 days of Plavix. Cont Asa 81 mg daily (x at least 1 year unless neurology changes). 2. Insomnia secondary to chronic pain  -     amitriptyline (ELAVIL) 25 mg tablet; Take 1 Tab by mouth nightly. - D/c Ambien.    - Would like to remove Ambien from regimen. From all his meds, I am concerned about this being the most contributory to this event. He will also discuss this with neurology. Hx of failure Trazodone, Lunesta, and Melatonin. - Sleep hygiene discussed. Avoidance of electronic blue light in the hour prior to sleep discussed. Advised of set wake/sleep times with nap avoidance. The complexity of medical decision making for this patient's transitional care is moderate   Follow-up and Dispositions    · Return for Patient has appt in 1 month already scheduled. .          Subjective:   Aidan Cordova is a 53 y.o. male presenting today for follow-up after being discharged from [facility]. The discharge summary was reviewed. The main problem requiring admission was global transient amnesia. Complications during admission: none    Patient consulted with neurology. Thought possibly related to recent increase of EtOH consumption. Also thought possibly related to Gabapentin med change, but patient states today that Gabapentin has actually not changed. Prior to admission, he had primarily been taking Gabapentin, Ambien, and Tylenol #3. Since discharge, he has tried to limit Tyl#3 to 1 daily. He has not been taking Zanaflex, not noting significant benefit. Interval history/Current status: No symptoms since discharge. He has not had any EtOH since discharge. He has limited Tylenol #3 and notes some increase of pain. Admitting symptoms have: resolved      Medications marked \"taking\" at this time:  Home Medications    Medication Sig Start Date End Date Taking? Authorizing Provider   folic acid (FOLVITE) 1 mg tablet Take 1 Tab by mouth daily. 5/2/20  Yes Odilia Austin MD   therapeutic multivitamin [facility]) tablet Take 1 Tab by mouth daily. 5/2/20  Yes Odilia Austin MD   thiamine mononitrate (B-1) 100 mg tablet Take 1 Tab by mouth daily. 5/2/20  Yes Odilia Austin MD   clopidogreL (PLAVIX) 75 mg tab Take 1 Tab by mouth daily. 5/2/20  Yes Odliia Austin MD   aspirin 81 mg chewable tablet Take 1 Tab by mouth daily. 5/2/20  Yes Odilia Austin MD   zolpidem (AMBIEN) 5 mg tablet TAKE 1 TABLET BY MOUTH EVERY NIGHT AS NEEDED FOR SLEEP. MAX DAILY AMOUNT: 5 MG 4/27/20  Yes Cornelia Awan PA   gabapentin (NEURONTIN) 300 mg capsule Take 1 cap po qam, 1 cap po every afternoon and 2 caps po qhs. 3/13/20  Yes Rodo Rodney MD   tamsulosin (FLOMAX) 0.4 mg capsule TAKE 1 CAPSULE BY MOUTH DAILY AFTER DINNER 3/5/20  Yes Hailee Shultz MD   budesonide-formoterol (SYMBICORT) 80-4.5 mcg/actuation HFAA Take 2 Puffs by inhalation two (2) times a day.  8/19/19  Yes SERA Mendez tiZANidine (ZANAFLEX) 2 mg tablet Take 1 Tab by mouth two (2) times daily as needed for Muscle Spasm(s). 3/9/20   SERA Orona   acetaminophen-codeine (TYLENOL #3) 300-30 mg per tablet TAKE 1 TABLET BY MOUTH THREE TIMES DAILY AS NEEDED FOR PAIN. MAX DAILY AMOUNT 3 TABLETS 3/6/20 4/5/20  SERA Orona   fenofibrate nanocrystallized (TRICOR) 145 mg tablet TAKE 1 TABLET BY MOUTH DAILY 5/13/19 5/6/20  SERA Mccracken   naloxone [facility]) 4 mg/actuation nasal spray Use 1 spray intranasally, then discard. Repeat with new spray every 2 min as needed for opioid overdose symptoms, alternating nostrils. 3/11/19   SERA Orona   albuterol (PROVENTIL HFA, VENTOLIN HFA, PROAIR HFA) 90 mcg/actuation inhaler Take  by inhalation. Provider, Historical        Review of Systems:  History obtained from chart review and the patient  Neurological ROS: no TIA or stroke symptoms               Objective: There were no vitals taken for this visit. General: alert, cooperative, no distress   Mental  status: normal mood, behavior, speech, dress, motor activity, and thought processes, able to follow commands   HENT: NCAT   Neck: no visualized mass   Resp: no respiratory distress   Neuro: no gross deficits   Skin: no discoloration or lesions of concern on visible areas   Psychiatric: normal affect, consistent with stated mood, no evidence of hallucinations     Additional exam findings: We discussed the expected course, resolution and complications of the diagnosis(es) in detail. Medication risks, benefits, costs, interactions, and alternatives were discussed as indicated. I advised him to contact the office if his condition worsens, changes or fails to improve as anticipated. He expressed understanding with the diagnosis(es) and plan. Ellis Valenzuela is a 53 y.o. male who was evaluated by a video visit encounter for concerns as above.  Patient identification was verified prior to start of the visit. A caregiver was present when appropriate. Due to this being a TeleHealth encounter (During XTEGG-67 public health emergency), evaluation of the following organ systems was limited: Vitals/Constitutional/EENT/Resp/CV/GI//MS/Neuro/Skin/Heme-Lymph-Imm. Pursuant to the emergency declaration under the [address] [place] [address] waiver authority and the [employer] Act, this Virtual  Visit was conducted, with patient's (and/or legal guardian's) consent, to reduce the patient's risk of exposure to COVID-19 and provide necessary medical care. Services were provided through a video synchronous discussion virtually to substitute for in-person clinic visit. Patient and provider were located at their individual homes.     SERA Kessler

## 2020-05-08 ENCOUNTER — VIRTUAL VISIT (OUTPATIENT)
Dept: NEUROLOGY | Age: 55
End: 2020-05-08

## 2020-05-08 VITALS — HEIGHT: 72 IN | BODY MASS INDEX: 30.48 KG/M2 | WEIGHT: 225 LBS

## 2020-05-08 DIAGNOSIS — R40.4 TRANSIENT ALTERATION OF AWARENESS: ICD-10-CM

## 2020-05-08 DIAGNOSIS — G45.4 TGA (TRANSIENT GLOBAL AMNESIA): Primary | ICD-10-CM

## 2020-05-08 NOTE — PROGRESS NOTES
Tosin Vazquez. is a 53 y.o. male new patient on virtual visit today to discuss transient global amnesia; as referred by ESRA Rawls. Email address for today's visit will be [email or web address]
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
## 2020-05-16 DIAGNOSIS — M54.50 LUMBAR SPINE PAIN: ICD-10-CM

## 2020-05-16 DIAGNOSIS — M54.12 CERVICAL RADICULOPATHY: ICD-10-CM

## 2020-05-18 RX ORDER — ACETAMINOPHEN AND CODEINE PHOSPHATE 300; 30 MG/1; MG/1
TABLET ORAL
Qty: 90 TAB | Refills: 0 | Status: SHIPPED | OUTPATIENT
Start: 2020-05-18 | End: 2020-06-19

## 2020-05-20 NOTE — DISCHARGE SUMMARY
[address]    Name:  Delilah Joel  MR#:   [id number]  :  1965  ACCOUNT #:  [de-identified]  ADMIT DATE:  2020  DISCHARGE DATE:  2020    ADMITTING DIAGNOSIS:  Transient global amnesia. HISTORY OF PRESENT ILLNESS:  The patient is a 76-year-old male who presented to the emergency room acting bizarrely. He is a  of a [employer] and had gone into work and was making bizarre phone calls to people. Eventually, his wife showed up and was concerned about him. She brought him to the emergency room. He had initial workup performed and was admitted for ongoing management. HOSPITAL COURSE:  Neurology consultation was obtained. The patient underwent imaging studies to include MRI of his brain as well as MRA of his head and neck. He also had CT scanning initially in the Emergency Department. It was felt that he had transient global amnesia. It was safe for him to be able to discharge home. He will follow up with his neurologist and primary care provider after discharge. By 2020, he was considered to be ready for discharge. He was discharged home. DIAGNOSIS:  Transient global amnesia, improved. DISPOSITION:  Home. CONDITION ON DISCHARGE:  Improved. ACTIVITY:  Ad zhen. DISCHARGE MEDICATIONS:  1. Folic acid 1 mg by mouth daily. 2.  Multivitamin 1 tablet by mouth daily. 3.  Thiamine 100 mg by mouth daily. 4.  Plavix 75 mg by mouth daily. 5.  Aspirin 81 mg by mouth daily. 6.  Ambien via home regimen. 7.  Neurontin 600 mg by mouth every evening. 8.  Zanaflex 2 mg by mouth 2 times daily. 9.  Flomax 0.4 mg by mouth daily after dinner. 10.  Tylenol No. 3 via home regimen. 11.  Symbicort 80/4.5 two puffs by inhalation 2 times daily. 12.  TriCor 145 mg by mouth daily. 13.  Narcan via home regimen. 14.  Proventil 90 mcg inhaler 3 times daily as needed for wheezing. DIET:  Regular.     DISCHARGE INSTRUCTIONS:  Follow up with her primary care provider in 1 week, the patient will arrange.     Total Discharge time 35 minutes      Sreekanth Velazquez MD    [id number]  D:  05/20/2020 10:11  T:  05/20/2020 12:34  JOB #:  [id number]

## 2020-05-22 ENCOUNTER — VIRTUAL VISIT (OUTPATIENT)
Dept: ORTHOPEDIC SURGERY | Age: 55
End: 2020-05-22

## 2020-05-22 DIAGNOSIS — M54.2 NECK PAIN: ICD-10-CM

## 2020-05-22 DIAGNOSIS — M79.18 CHRONIC PRIMARY MUSCULOSKELETAL PAIN: ICD-10-CM

## 2020-05-22 DIAGNOSIS — M54.12 CERVICAL RADICULOPATHY: Primary | ICD-10-CM

## 2020-05-22 DIAGNOSIS — G89.29 CHRONIC PRIMARY MUSCULOSKELETAL PAIN: ICD-10-CM

## 2020-05-22 RX ORDER — PREDNISONE 10 MG/1
10 TABLET ORAL SEE ADMIN INSTRUCTIONS
Qty: 21 TAB | Refills: 5 | Status: SHIPPED | OUTPATIENT
Start: 2020-05-22 | End: 2020-09-10 | Stop reason: ALTCHOICE

## 2020-05-22 RX ORDER — GABAPENTIN 300 MG/1
CAPSULE ORAL
Qty: 120 CAP | Refills: 5 | Status: SHIPPED | OUTPATIENT
Start: 2020-05-22 | End: 2020-11-23 | Stop reason: SDUPTHER
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
## 2020-06-08 ENCOUNTER — VIRTUAL VISIT (OUTPATIENT)
Dept: INTERNAL MEDICINE CLINIC | Age: 55
End: 2020-06-08

## 2020-06-08 DIAGNOSIS — M54.50 LUMBAR SPINE PAIN: ICD-10-CM

## 2020-06-08 DIAGNOSIS — M54.12 CERVICAL RADICULOPATHY: Primary | ICD-10-CM

## 2020-06-08 DIAGNOSIS — M79.18 CHRONIC PRIMARY MUSCULOSKELETAL PAIN: ICD-10-CM

## 2020-06-08 DIAGNOSIS — M54.16 LUMBAR RADICULOPATHY: ICD-10-CM

## 2020-06-08 DIAGNOSIS — F51.04 CHRONIC INSOMNIA: ICD-10-CM

## 2020-06-08 DIAGNOSIS — G89.29 CHRONIC PRIMARY MUSCULOSKELETAL PAIN: ICD-10-CM

## 2020-06-08 DIAGNOSIS — M54.2 NECK PAIN: ICD-10-CM

## 2020-06-08 NOTE — PROGRESS NOTES
Thelma Patel is a 53 y.o. male who was seen by synchronous (real-time) audio-video technology on 6/8/2020. Consent: Thelma Patel, who was seen by synchronous (real-time) audio-video technology, and/or his healthcare decision maker, is aware that this patient-initiated, Telehealth encounter on 6/8/2020 is a billable service, with coverage as determined by his insurance carrier. He is aware that he may receive a bill and has provided verbal consent to proceed: Yes. Assessment & Plan:     Diagnoses and all orders for this visit:    1. Cervical radiculopathy  2. Neck pain  3. Chronic primary musculoskeletal pain  - Cont Tylenol #3. He is trying to resume lower dosage of 2 daily. Due for refill later this month. - Cont with ortho and pain management plan. 4. Lumbar radiculopathy  5. Lumbar spine pain  - Cont current plan. 6. Chronic insomnia  - Pleased with transition from Ambien. Cont Amitriptyline 25 mg nightly. Follow-up and Dispositions    · Return in about 3 months (around 9/8/2020) for follow up chronic pain. I spent at least 25 minutes on this visit with this established patient. Subjective:   Thelma Patel is a 53 y.o. male who was seen for Follow-up (chronic pain)    1) Chronic neck and low back pain - see recent ortho visit note with Dr. Edson Newberry. Patient with recent acute exacerbation of chronic neck pain s/p fixing some ceiling tiles. He reports pain was severe, but it has been better the past 4 days. He was requiring 3 full Tylenol #3 tabs during worst of pain, but is trying to decrease back to 2 daily now. He has also been utilizing his TENS unit, Icy hot, and massage which seem to help. - Upcoming neck njections with Dr. Elvira Agudelo of pain management - 6/17/2020. Thelma Gutierrez. RTC today to follow up on chronic pain diagnosis. We discussed his myalgias and radiculopathy that is affecting his back and neck.   Significant changes since last visit: see above.  He has been less able to do his normal daily activities. He reports the following adverse side effects: none. Least pain over the last week has been 5/10. Worst pain over the last week has been 10/10. Opioid Risk Tool Reviewed: NO:     Aberrant behaviors: None. Urine Drug Screen: reviewed and up to date. Controlled substance agreement on file: YES.  reviewed:yes    Pill count is consistent with his prescription: yes    Concomitant use of a benzodiazepine: no    MME/day <50. Narcan not warranted. Also,  abstinence syndrome was reviewed and discussed with her today N/A      2) F/u neurology - upcoming EEG appt this Wednesday, 6/10/2020. See visit note. 3) Chronic insomnia - c/o some daytime grogginess but seems to be sleeping okay with Amitriptyline 25 mg nightly outside of when pain from recent exacerbation was most severe. Prior to Admission medications    Medication Sig Start Date End Date Taking? Authorizing Provider   gabapentin (NEURONTIN) 300 mg capsule Take 1 cap po qam, 1 cap po every afternoon and 2 caps po qhs. 20   Kristin Mello MD   predniSONE (STERAPRED DS) 10 mg dose pack Take 1 Tab by mouth See Admin Instructions. See administration instruction per 10mg dose pack 20   Kristin Mello MD   acetaminophen-codeine (TYLENOL #3) 300-30 mg per tablet TAKE 1 TABLET BY MOUTH THREE TIMES DAILY AS NEEDED FOR PAIN MAX DAILY AMOUNT 3 TABLETS 20  SERA Palacio   amitriptyline (ELAVIL) 25 mg tablet Take 1 Tab by mouth nightly. 20   SERA Maynard   folic acid (FOLVITE) 1 mg tablet Take 1 Tab by mouth daily. 20   Osvaldo Brice MD   therapeutic multivitamin [facility]) tablet Take 1 Tab by mouth daily. 20   Osvaldo Brice MD   thiamine mononitrate (B-1) 100 mg tablet Take 1 Tab by mouth daily. 20   Osvaldo Brice MD   aspirin 81 mg chewable tablet Take 1 Tab by mouth daily.  20   Micaela Niki Palacios MD   tamsulosin (FLOMAX) 0.4 mg capsule TAKE 1 CAPSULE BY MOUTH DAILY AFTER DINNER 3/5/20   Ollie Galindo MD   budesonide-formoterol [facility]) 80-4.5 mcg/actuation HFAA Take 2 Puffs by inhalation two (2) times a day. 8/19/19   SERA Garcia   naloxone [facility]) 4 mg/actuation nasal spray Use 1 spray intranasally, then discard. Repeat with new spray every 2 min as needed for opioid overdose symptoms, alternating nostrils. 3/11/19   Dolan Goltz H, PA   albuterol (PROVENTIL HFA, VENTOLIN HFA, PROAIR HFA) 90 mcg/actuation inhaler Take  by inhalation. Provider, Historical     Allergies   Allergen Reactions    Bee Venom Protein (Honey Bee) Swelling       Past Medical History:   Diagnosis Date    Asthma     Benign prostatic hyperplasia with lower urinary tract symptoms     Benign prostatic hyperplasia with urinary obstruction     Cervicalgia     Chronic pain     Elevated PSA     Flank pain     Gout     Head ache     Hearing loss     Hypertrophy of prostate with urinary obstruction and other lower urinary tract symptoms (LUTS)     Kidney stones     Lumbago     Neuralgia     Testicular abnormality     Urge incontinence     Urinary frequency      Past Surgical History:   Procedure Laterality Date    HX HERNIA REPAIR      HX TONSILLECTOMY      HX UROLOGICAL  8/27/15    PNBx-TRUS Vol 54 cc's, Benign, Dr. Lai Manvel         Review of Systems   Musculoskeletal: Positive for back pain and neck pain. Psychiatric/Behavioral: The patient has insomnia. Objective:   Vital Signs: (As obtained by patient/caregiver at home)  There were no vitals taken for this visit.      [INSTRUCTIONS:  \"[x]\" Indicates a positive item  \"[]\" Indicates a negative item  -- DELETE ALL ITEMS NOT EXAMINED]    Constitutional: [x] Appears well-developed and well-nourished [x] No apparent distress      [] Abnormal -     Mental status: [x] Alert and awake  [x] Oriented to person/place/time [x] Able to follow commands    [] Abnormal -     Eyes:   EOM    [x]  Normal    [] Abnormal -   Sclera  [x]  Normal    [] Abnormal -          Discharge [x]  None visible   [] Abnormal -     HENT: [x] Normocephalic, atraumatic  [] Abnormal -   [x] Mouth/Throat: Mucous membranes are moist    External Ears [x] Normal  [] Abnormal -    Neck: [x] No visualized mass [] Abnormal -     Pulmonary/Chest: [x] Respiratory effort normal   [x] No visualized signs of difficulty breathing or respiratory distress        [] Abnormal -      Musculoskeletal:   [] Normal gait with no signs of ataxia         [x] Normal range of motion of neck        [] Abnormal -     Neurological:        [x] No Facial Asymmetry (Cranial nerve 7 motor function) (limited exam due to video visit)          [x] No gaze palsy        [] Abnormal -          Skin:        [x] No significant exanthematous lesions or discoloration noted on facial skin         [] Abnormal -            Psychiatric:       [x] Normal Affect [] Abnormal -        [x] No Hallucinations    Other pertinent observable physical exam findings:-        We discussed the expected course, resolution and complications of the diagnosis(es) in detail. Medication risks, benefits, costs, interactions, and alternatives were discussed as indicated. I advised him to contact the office if his condition worsens, changes or fails to improve as anticipated. He expressed understanding with the diagnosis(es) and plan. Shireen Morelos. is a 53 y.o. male who was evaluated by a video visit encounter for concerns as above. Patient identification was verified prior to start of the visit. A caregiver was present when appropriate. Due to this being a TeleHealth encounter (During [facility]-06 public health emergency), evaluation of the following organ systems was limited: Vitals/Constitutional/EENT/Resp/CV/GI//MS/Neuro/Skin/Heme-Lymph-Imm.   Pursuant to the emergency declaration under the [employer] Act and the [address] waiver authority and the [employer] Act, this Virtual  Visit was conducted, with patient's (and/or legal guardian's) consent, to reduce the patient's risk of exposure to COVID-19 and provide necessary medical care. Services were provided through a video synchronous discussion virtually to substitute for in-person clinic visit. Patient and provider were located at their individual homes.       SERA Kovacs

## 2020-06-10 ENCOUNTER — HOSPITAL ENCOUNTER (OUTPATIENT)
Dept: NEUROLOGY | Age: 55
Discharge: HOME | End: 2020-06-10
Attending: STUDENT IN AN ORGANIZED HEALTH CARE EDUCATION/TRAINING PROGRAM
Payer: MEDICARE

## 2020-06-10 DIAGNOSIS — R40.4 TRANSIENT ALTERATION OF AWARENESS: ICD-10-CM

## 2020-06-10 DIAGNOSIS — G45.4 TGA (TRANSIENT GLOBAL AMNESIA): ICD-10-CM

## 2020-06-10 PROCEDURE — 95819 EEG AWAKE AND ASLEEP: CPT

## 2020-06-15 NOTE — PROCEDURES
[facility]  EEG    Name:  Junior Calle  MR#:   [id number]  :  1965  ACCOUNT #:  [de-identified]  DATE OF SERVICE:  06/10/2020    OUTPATIENT RECORDING    REFERRING PROVIDER:  The EEG was ordered by Daniel Torres MD    EEG NUMBER:  MV EEG     REASON FOR EEG:  For evaluation of transient alteration of awareness. EEG TECHNIQUE USED:  Standard 10-20 system with 21-channel recording. Activation procedure used was photic stimulation. Total duration of the recording was 27 minutes. EEG REPORT:  The background consists of posterior dominant alpha rhythm at a frequency of 10-11 Hz. No marked asymmetry between the right and left sides. There are no obvious spikes or sharp wave discharges. Photic stimulation did not induce any additional discharges. No marked focal slowing. IMPRESSION:  This is a normal EEG recording. CLINICAL CORRELATION:  A normal EEG does not rule out the possibility of seizures or epilepsy.       MD GORDON [email or web address]  D:  2020 16:52  T:  06/15/2020 0:46  JOB #:  [id number]

## 2020-06-17 DIAGNOSIS — M54.12 CERVICAL RADICULOPATHY: ICD-10-CM

## 2020-06-17 DIAGNOSIS — M54.50 LUMBAR SPINE PAIN: ICD-10-CM

## 2020-06-19 RX ORDER — ACETAMINOPHEN AND CODEINE PHOSPHATE 300; 30 MG/1; MG/1
TABLET ORAL
Qty: 90 TAB | Refills: 0 | Status: SHIPPED | OUTPATIENT
Start: 2020-06-19 | End: 2020-08-18 | Stop reason: SDUPTHER

## 2020-06-30 ENCOUNTER — TELEPHONE (OUTPATIENT)
Dept: INTERNAL MEDICINE CLINIC | Age: 55
End: 2020-06-30

## 2020-06-30 NOTE — TELEPHONE ENCOUNTER
PA request received for Amitriptyline 25mg tabs.     Not covered under patient's plan - [phone]  Patient ID# [id number]

## 2020-07-06 NOTE — TELEPHONE ENCOUNTER
I called this number and was told medication was paid for on July 1st by workers comp. I called pharmacy and verified.

## 2020-08-18 DIAGNOSIS — M54.50 LUMBAR SPINE PAIN: ICD-10-CM

## 2020-08-18 DIAGNOSIS — M54.12 CERVICAL RADICULOPATHY: ICD-10-CM

## 2020-08-19 RX ORDER — ACETAMINOPHEN AND CODEINE PHOSPHATE 300; 30 MG/1; MG/1
1 TABLET ORAL
Qty: 90 TAB | Refills: 0 | Status: SHIPPED | OUTPATIENT
Start: 2020-08-19 | End: 2020-10-28 | Stop reason: SDUPTHER

## 2020-08-24 DIAGNOSIS — G47.01 INSOMNIA SECONDARY TO CHRONIC PAIN: ICD-10-CM

## 2020-08-24 DIAGNOSIS — G89.29 INSOMNIA SECONDARY TO CHRONIC PAIN: ICD-10-CM

## 2020-08-26 RX ORDER — AMITRIPTYLINE HYDROCHLORIDE 25 MG/1
TABLET, FILM COATED ORAL
Qty: 30 TAB | Refills: 2 | Status: SHIPPED | OUTPATIENT
Start: 2020-08-26 | End: 2020-12-02 | Stop reason: SDUPTHER

## 2020-09-10 ENCOUNTER — OFFICE VISIT (OUTPATIENT)
Dept: ORTHOPEDIC SURGERY | Age: 55
End: 2020-09-10

## 2020-09-10 VITALS
HEART RATE: 69 BPM | HEIGHT: 72 IN | SYSTOLIC BLOOD PRESSURE: 129 MMHG | WEIGHT: 249 LBS | DIASTOLIC BLOOD PRESSURE: 93 MMHG | BODY MASS INDEX: 33.72 KG/M2 | TEMPERATURE: 97.2 F

## 2020-09-10 DIAGNOSIS — M79.18 CHRONIC PRIMARY MUSCULOSKELETAL PAIN: ICD-10-CM

## 2020-09-10 DIAGNOSIS — G89.29 CHRONIC PRIMARY MUSCULOSKELETAL PAIN: ICD-10-CM

## 2020-09-10 DIAGNOSIS — M79.18 MYOFASCIAL PAIN: ICD-10-CM

## 2020-09-10 DIAGNOSIS — M54.2 NECK PAIN: ICD-10-CM

## 2020-09-10 DIAGNOSIS — M54.50 LUMBAR SPINE PAIN: ICD-10-CM

## 2020-09-10 DIAGNOSIS — M54.12 CERVICAL RADICULOPATHY: Primary | ICD-10-CM
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
## 2020-10-30 ENCOUNTER — OFFICE VISIT (OUTPATIENT)
Dept: INTERNAL MEDICINE CLINIC | Age: 55
End: 2020-10-30
Payer: MEDICARE

## 2020-10-30 ENCOUNTER — TELEPHONE (OUTPATIENT)
Dept: INTERNAL MEDICINE CLINIC | Age: 55
End: 2020-10-30

## 2020-10-30 VITALS
DIASTOLIC BLOOD PRESSURE: 89 MMHG | TEMPERATURE: 97 F | SYSTOLIC BLOOD PRESSURE: 122 MMHG | RESPIRATION RATE: 10 BRPM | HEIGHT: 72 IN | WEIGHT: 256.4 LBS | HEART RATE: 90 BPM | OXYGEN SATURATION: 97 % | BODY MASS INDEX: 34.73 KG/M2

## 2020-10-30 DIAGNOSIS — M10.072 ACUTE IDIOPATHIC GOUT INVOLVING TOE OF LEFT FOOT: Primary | ICD-10-CM

## 2020-10-30 DIAGNOSIS — M54.12 CERVICAL RADICULOPATHY: ICD-10-CM

## 2020-10-30 DIAGNOSIS — Z79.899 CONTROLLED SUBSTANCE AGREEMENT SIGNED: ICD-10-CM

## 2020-10-30 DIAGNOSIS — M54.50 LUMBAR SPINE PAIN: ICD-10-CM

## 2020-10-30 DIAGNOSIS — R52 PAIN MANAGEMENT: ICD-10-CM

## 2020-10-30 PROCEDURE — 3017F COLORECTAL CA SCREEN DOC REV: CPT | Performed by: PHYSICIAN ASSISTANT

## 2020-10-30 PROCEDURE — G8754 DIAS BP LESS 90: HCPCS | Performed by: PHYSICIAN ASSISTANT

## 2020-10-30 PROCEDURE — G8432 DEP SCR NOT DOC, RNG: HCPCS | Performed by: PHYSICIAN ASSISTANT

## 2020-10-30 PROCEDURE — G8417 CALC BMI ABV UP PARAM F/U: HCPCS | Performed by: PHYSICIAN ASSISTANT

## 2020-10-30 PROCEDURE — G8752 SYS BP LESS 140: HCPCS | Performed by: PHYSICIAN ASSISTANT

## 2020-10-30 PROCEDURE — G8427 DOCREV CUR MEDS BY ELIG CLIN: HCPCS | Performed by: PHYSICIAN ASSISTANT

## 2020-10-30 PROCEDURE — 99213 OFFICE O/P EST LOW 20 MIN: CPT | Performed by: PHYSICIAN ASSISTANT

## 2020-10-30 RX ORDER — INDOMETHACIN 25 MG/1
25-50 CAPSULE ORAL 3 TIMES DAILY
Qty: 42 CAP | Refills: 0 | Status: SHIPPED | OUTPATIENT
Start: 2020-10-30 | End: 2020-11-06

## 2020-10-30 RX ORDER — COLCHICINE 0.6 MG/1
1.2 CAPSULE ORAL
Qty: 3 CAP | Refills: 0 | Status: SHIPPED | OUTPATIENT
Start: 2020-10-30 | End: 2020-10-30

## 2020-10-30 NOTE — PATIENT INSTRUCTIONS
Gout: Care Instructions Your Care Instructions Gout is a form of arthritis caused by a buildup of uric acid crystals in a joint. It causes sudden attacks of pain, swelling, redness, and stiffness, usually in one joint, especially the big toe. Gout usually comes on without a cause. But it can be brought on by drinking alcohol (especially beer) or eating seafood and red meat. Taking certain medicines, such as diuretics or aspirin, also can bring on an attack of gout. Taking your medicines as prescribed and following up with your doctor regularly can help you avoid gout attacks in the future. Follow-up care is a key part of your treatment and safety. Be sure to make and go to all appointments, and call your doctor if you are having problems. It's also a good idea to know your test results and keep a list of the medicines you take. How can you care for yourself at home? · If the joint is swollen, put ice or a cold pack on the area for 10 to 20 minutes at a time. Put a thin cloth between the ice and your skin. · Prop up the sore limb on a pillow when you ice it or anytime you sit or lie down during the next 3 days. Try to keep it above the level of your heart. This will help reduce swelling. · Rest sore joints. Avoid activities that put weight or strain on the joints for a few days. Take short rest breaks from your regular activities during the day. · Take your medicines exactly as prescribed. Call your doctor if you think you are having a problem with your medicine. · Take pain medicines exactly as directed. ? If the doctor gave you a prescription medicine for pain, take it as prescribed. ? If you are not taking a prescription pain medicine, ask your doctor if you can take an over-the-counter medicine. · Eat less seafood and red meat. · Check with your doctor before drinking alcohol. · Losing weight, if you are overweight, may help reduce attacks of gout. But do not go on a [employer]. \" Losing a lot of weight in a short amount of time can cause a gout attack. When should you call for help? Call your doctor now or seek immediate medical care if: 
  · You have a fever.  
  · The joint is so painful you cannot use it.  
  · You have sudden, unexplained swelling, redness, warmth, or severe pain in one or more joints. Watch closely for changes in your health, and be sure to contact your doctor if: 
  · You have joint pain.  
  · Your symptoms get worse or are not improving after 2 or 3 days. Where can you learn more? Go to http://www.gray.com/ Enter N987 in the search box to learn more about \"Gout: Care Instructions. \" Current as of: December 9, 2019               Content Version: 12.6 © 7713-2884 [employer]. Care instructions adapted under license by [employer] (which disclaims liability or warranty for this information). If you have questions about a medical condition or this instruction, always ask your healthcare professional. [address] any warranty or liability for your use of this information. Purine-Restricted Diet: Care Instructions Your Care Instructions Purines are substances that are found in some foods. Your body turns purines into uric acid. High levels of uric acid can cause gout, which is a form of arthritis that causes pain and inflammation in joints. You may be able to help control the amount of uric acid in your body by limiting high-purine foods in your diet. Follow-up care is a key part of your treatment and safety. Be sure to make and go to all appointments, and call your doctor if you are having problems. It's also a good idea to know your test results and keep a list of the medicines you take. How can you care for yourself at home?  
· Plan your meals and snacks around foods that are low in purines and are safe for you to eat. These foods include: ? Green vegetables and tomatoes. ? Fruits. ? Whole-grain breads, rice, and cereals. ? Eggs, peanut butter, and nuts. ? Low-fat milk, cheese, and other milk products. ? Popcorn. ? Gelatin desserts, chocolate, cocoa, and cakes and sweets, in small amounts. · You can eat certain foods that are medium-high in purines, but eat them only once in a while. These foods include: 
? Legumes, such as dried beans and dried peas. You can have 1 cup cooked legumes each day. ? Asparagus, cauliflower, spinach, mushrooms, and green peas. ? Fish and seafood (other than very high-purine seafood). ? Oatmeal, wheat bran, and wheat germ. · Limit very high-purine foods, including: 
? Organ meats, such as liver, kidneys, sweetbreads, and brains. ? Meats, including clemens, beef, pork, and lamb. ? Game meats and any other meats in large amounts. ? Anchovies, sardines, herring, mackerel, and scallops. ? Gravy. ? Beer. Where can you learn more? Go to http://www.gray.com/ Enter F448 in the search box to learn more about \"Purine-Restricted Diet: Care Instructions. \" Current as of: August 22, 2019               Content Version: 12.6 © 0256-5236 [employer]. Care instructions adapted under license by [employer] (which disclaims liability or warranty for this information). If you have questions about a medical condition or this instruction, always ask your healthcare professional. [place] [address] any warranty or liability for your use of this information.

## 2020-10-30 NOTE — PROGRESS NOTES
HISTORY OF PRESENT ILLNESS  Rasheeda Ivy is a 53 y.o. male. HPI  Presents for acute left foot 1st MTP joint pain x few days. Hx of 1 similar episode in the past. He admits to a poor diet (recent steak and EtOH meal). He has also gained weight since last spring. He intends to improve his diet. Hx of Allopurinol in the past, but he would prefer to avoid this and improve his diet. Review of Systems   Constitutional: Negative for chills and fever. Musculoskeletal: Positive for joint pain (L foot). Visit Vitals  /89 (BP 1 Location: Right arm, BP Patient Position: Sitting)   Pulse 90   Temp 97 °F (36.1 °C) (Oral)   Resp 10   Ht 6' (1.829 m)   Wt 256 lb 6.4 oz (116.3 kg)   SpO2 97%   BMI 34.77 kg/m²       Physical Exam  Constitutional:       General: He is not in acute distress. Appearance: Normal appearance. He is well-developed. HENT:      Head: Normocephalic and atraumatic. Right Ear: Tympanic membrane, ear canal and external ear normal.      Left Ear: Tympanic membrane, ear canal and external ear normal.      Nose: Nose normal.      Mouth/Throat:      Comments: Mask  Eyes:      General: No scleral icterus. Conjunctiva/sclera: Conjunctivae normal.      Pupils: Pupils are equal, round, and reactive to light. Neck:      Musculoskeletal: Neck supple. Cardiovascular:      Rate and Rhythm: Normal rate and regular rhythm. Pulses: Normal pulses. Dorsalis pedis pulses are 2+ on the right side and 2+ on the left side. Posterior tibial pulses are 2+ on the right side and 2+ on the left side. Heart sounds: Normal heart sounds. No murmur. No gallop. Pulmonary:      Effort: Pulmonary effort is normal. No respiratory distress. Breath sounds: Normal breath sounds. No decreased breath sounds, wheezing, rhonchi or rales. Musculoskeletal:      Right lower leg: No edema. Left lower leg: No edema.         Feet:    Lymphadenopathy:      Head:      Right side of head: No submandibular or tonsillar adenopathy. Left side of head: No submandibular or tonsillar adenopathy. Cervical: No cervical adenopathy. Upper Body:      Right upper body: No supraclavicular adenopathy. Left upper body: No supraclavicular adenopathy. Skin:     General: Skin is warm and dry. Neurological:      Mental Status: He is alert and oriented to person, place, and time. Psychiatric:         Speech: Speech normal.         ASSESSMENT and PLAN  Diagnoses and all orders for this visit:    1. Acute idiopathic gout involving toe of left foot  -     colchicine (MITIGARE) 0.6 mg capsule; Take 2 Caps by mouth now for 1 dose. Then take 1 cap 1 hour later. -     indomethacin (INDOCIN) 25 mg capsule; Take 1-2 Caps by mouth three (3) times daily for 7 days. Take with food. - Considered Allopurinol resumption after resolution of acute attack. He desires to hold and improve his diet. Will check uric acid with next lab outside of acute flare.      2. Pain management  -     TOXASSURE SELECT 13 (MW); Future

## 2020-10-30 NOTE — PROGRESS NOTES
Katie Gomez is a 53 y.o. male (: 1965) presenting to address:    Chief Complaint   Patient presents with    Gout       Vitals:    10/30/20 1051   BP: 122/89   Pulse: 90   Resp: 10   Temp: 97 °F (36.1 °C)   TempSrc: Oral   SpO2: 97%   Weight: 256 lb 6.4 oz (116.3 kg)   Height: 6' (1.829 m)   PainSc:  10 - Worst pain ever   PainLoc: Foot       Hearing/Vision:   No exam data present    Learning Assessment:     Learning Assessment 2019   PRIMARY LEARNER Patient   HIGHEST LEVEL OF EDUCATION - PRIMARY LEARNER  > 4 YEARS OF COLLEGE   BARRIERS PRIMARY LEARNER NONE   CO-LEARNER CAREGIVER No   PRIMARY LANGUAGE ENGLISH   LEARNER PREFERENCE PRIMARY DEMONSTRATION   ANSWERED BY Patient   RELATIONSHIP SELF     Depression Screening:     3 most recent PHQ Screens 10/30/2020   Little interest or pleasure in doing things Not at all   Feeling down, depressed, irritable, or hopeless Not at all   Total Score PHQ 2 0     Fall Risk Assessment:   No flowsheet data found. Abuse Screening:   No flowsheet data found. Coordination of Care Questionaire:   1. Have you been to the ER, urgent care clinic since your last visit? Hospitalized since your last visit? NO    2. Have you seen or consulted any other health care providers outside of the [address] [place] [address] since your last visit? Include any pap smears or colon screening. YES    Advanced Directive:   1. Do you have an Advanced Directive? NO    2. Would you like information on Advanced Directives?  NO

## 2020-10-30 NOTE — TELEPHONE ENCOUNTER
Pt called re his visit today with signs and sxs of gout. Reports his foot proximal to the toe is also red now. He has taken the 2 colchicine tablets but none of the indocin yet. He has not fever or chills. Reports there is no broken skin to suggest and infection. Except for the toe/foot he feels OK. Advised him it may have been early in the gout attack and the meds would not have had time to kick in yet. Reviewed possible GI effects. Advised him to take 2 of the indocin now and before bedtime today.  If he feels worse over the next 24 hours or develops a high fever, he should go to the ER

## 2020-11-02 ENCOUNTER — TELEPHONE (OUTPATIENT)
Dept: INTERNAL MEDICINE CLINIC | Age: 55
End: 2020-11-02

## 2020-11-02 RX ORDER — COLCHICINE 0.6 MG/1
TABLET ORAL
Qty: 3 TAB | Refills: 2 | Status: SHIPPED | OUTPATIENT
Start: 2020-11-02 | End: 2021-03-01 | Stop reason: SDUPTHER

## 2020-11-02 NOTE — TELEPHONE ENCOUNTER
This is strange as patient has been taking this medication for months, and it is a very old generic medication. Please contact pharmacy to further investigate.

## 2020-11-02 NOTE — TELEPHONE ENCOUNTER
Per pharmacy fax amitriptyline 25mg is not covered under patient insurance. Please submit new medication.

## 2020-11-04 LAB — DRUGS UR: NORMAL

## 2020-11-04 NOTE — PROGRESS NOTES
Patient had Rx for diazepam #2 tab filled on 10/15/2020. Please obtain more information about this prescription - when was it taken and for what purpose?

## 2020-11-23 DIAGNOSIS — M54.12 CERVICAL RADICULOPATHY: ICD-10-CM

## 2020-11-24 RX ORDER — GABAPENTIN 300 MG/1
CAPSULE ORAL
Qty: 120 CAP | Refills: 5 | Status: SHIPPED | OUTPATIENT
Start: 2020-11-24 | End: 2021-03-01 | Stop reason: SDUPTHER

## 2020-12-02 DIAGNOSIS — G47.01 INSOMNIA SECONDARY TO CHRONIC PAIN: ICD-10-CM

## 2020-12-02 DIAGNOSIS — G89.29 INSOMNIA SECONDARY TO CHRONIC PAIN: ICD-10-CM

## 2020-12-03 RX ORDER — AMITRIPTYLINE HYDROCHLORIDE 25 MG/1
TABLET, FILM COATED ORAL
Qty: 30 TAB | Refills: 2 | Status: SHIPPED | OUTPATIENT
Start: 2020-12-03 | End: 2021-03-08 | Stop reason: SDUPTHER

## 2020-12-21 DIAGNOSIS — M54.12 CERVICAL RADICULOPATHY: ICD-10-CM

## 2020-12-21 RX ORDER — GABAPENTIN 300 MG/1
CAPSULE ORAL
Qty: 120 CAP | OUTPATIENT
Start: 2020-12-21

## 2020-12-28 NOTE — TELEPHONE ENCOUNTER
Pharmacy fax request, Last OV 10/30/20 no future appts scheduled. Requested Prescriptions     Pending Prescriptions Disp Refills    budesonide-formoteroL (Symbicort) 80-4.5 mcg/actuation HFAA 1 Inhaler 5     Sig: Take 2 Puffs by inhalation two (2) times a day.

## 2020-12-29 RX ORDER — BUDESONIDE AND FORMOTEROL FUMARATE DIHYDRATE 80; 4.5 UG/1; UG/1
2 AEROSOL RESPIRATORY (INHALATION) 2 TIMES DAILY
Qty: 1 INHALER | Refills: 5 | Status: SHIPPED | OUTPATIENT
Start: 2020-12-29 | End: 2021-05-04 | Stop reason: SDUPTHER

## 2021-03-01 DIAGNOSIS — M54.12 CERVICAL RADICULOPATHY: ICD-10-CM

## 2021-03-01 RX ORDER — COLCHICINE 0.6 MG/1
TABLET ORAL
Qty: 3 TAB | Refills: 2 | Status: SHIPPED | OUTPATIENT
Start: 2021-03-01 | End: 2021-07-28 | Stop reason: SDUPTHER

## 2021-03-01 RX ORDER — GABAPENTIN 300 MG/1
CAPSULE ORAL
Qty: 120 CAP | Refills: 5 | Status: SHIPPED | OUTPATIENT
Start: 2021-03-01 | End: 2022-03-08 | Stop reason: SDUPTHER

## 2021-03-01 NOTE — TELEPHONE ENCOUNTER
Patient request for refill. States he put the request in through his pharmacy on Friday but found out today there system has been down all weekend and was told he would have to have his meds sent to a new pharmacy. Patient is leaving to go out of town tomorrow. Requested Prescriptions     Pending Prescriptions Disp Refills    gabapentin (NEURONTIN) 300 mg capsule 120 Cap 5     Sig: Take 1 cap po qam, 1 cap po every afternoon and 2 caps po qhs.  colchicine (Colcrys) 0.6 mg tablet 3 Tab 2     Sig: Take 2 capsules by mouth now for 1 dose. Then take 1 cap by mouth 1 hour later.

## 2021-03-11 ENCOUNTER — OFFICE VISIT (OUTPATIENT)
Dept: ORTHOPEDIC SURGERY | Age: 56
End: 2021-03-11
Payer: OTHER MISCELLANEOUS

## 2021-03-11 VITALS
BODY MASS INDEX: 35.89 KG/M2 | DIASTOLIC BLOOD PRESSURE: 97 MMHG | OXYGEN SATURATION: 97 % | SYSTOLIC BLOOD PRESSURE: 147 MMHG | TEMPERATURE: 97.5 F | HEART RATE: 86 BPM | HEIGHT: 72 IN | WEIGHT: 265 LBS

## 2021-03-11 DIAGNOSIS — G89.29 CHRONIC PRIMARY MUSCULOSKELETAL PAIN: ICD-10-CM

## 2021-03-11 DIAGNOSIS — M54.50 LUMBAR SPINE PAIN: ICD-10-CM

## 2021-03-11 DIAGNOSIS — M79.18 CHRONIC PRIMARY MUSCULOSKELETAL PAIN: ICD-10-CM

## 2021-03-11 DIAGNOSIS — M54.12 CERVICAL RADICULOPATHY: Primary | ICD-10-CM

## 2021-03-11 DIAGNOSIS — M54.2 NECK PAIN: ICD-10-CM

## 2021-03-11 DIAGNOSIS — M79.18 MYOFASCIAL PAIN: ICD-10-CM

## 2021-03-11 PROCEDURE — 99213 OFFICE O/P EST LOW 20 MIN: CPT | Performed by: PHYSICAL MEDICINE & REHABILITATION
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
## 2021-04-13 ENCOUNTER — VIRTUAL VISIT (OUTPATIENT)
Dept: ORTHOPEDIC SURGERY | Age: 56
End: 2021-04-13
Payer: OTHER MISCELLANEOUS

## 2021-04-13 DIAGNOSIS — G89.29 CHRONIC PRIMARY MUSCULOSKELETAL PAIN: ICD-10-CM

## 2021-04-13 DIAGNOSIS — M54.2 NECK PAIN: ICD-10-CM

## 2021-04-13 DIAGNOSIS — M79.18 MYOFASCIAL PAIN: ICD-10-CM

## 2021-04-13 DIAGNOSIS — M54.12 CERVICAL RADICULOPATHY: Primary | ICD-10-CM

## 2021-04-13 DIAGNOSIS — M79.18 CHRONIC PRIMARY MUSCULOSKELETAL PAIN: ICD-10-CM

## 2021-04-13 PROCEDURE — 99213 OFFICE O/P EST LOW 20 MIN: CPT | Performed by: PHYSICAL MEDICINE & REHABILITATION
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
## 2021-05-04 RX ORDER — BUDESONIDE AND FORMOTEROL FUMARATE DIHYDRATE 80; 4.5 UG/1; UG/1
2 AEROSOL RESPIRATORY (INHALATION) 2 TIMES DAILY
Qty: 1 INHALER | Refills: 5 | Status: SHIPPED | OUTPATIENT
Start: 2021-05-04 | End: 2021-10-28 | Stop reason: SDUPTHER

## 2021-05-04 NOTE — TELEPHONE ENCOUNTER
Please refill med. Requested Prescriptions     Pending Prescriptions Disp Refills    budesonide-formoteroL (Symbicort) 80-4.5 mcg/actuation HFAA 1 Inhaler 5     Sig: Take 2 Puffs by inhalation two (2) times a day.

## 2021-07-28 ENCOUNTER — TELEPHONE (OUTPATIENT)
Dept: INTERNAL MEDICINE CLINIC | Age: 56
End: 2021-07-28

## 2021-07-28 RX ORDER — COLCHICINE 0.6 MG/1
TABLET ORAL
Qty: 30 TABLET | Refills: 0 | Status: SHIPPED | OUTPATIENT
Start: 2021-07-28

## 2021-07-28 NOTE — TELEPHONE ENCOUNTER
Patient would like to know if medication can be prescribed for a gout flare up that has been going on for 2 days.

## 2021-07-30 RX ORDER — INDOMETHACIN 25 MG/1
25 CAPSULE ORAL 3 TIMES DAILY
Qty: 30 CAPSULE | Refills: 2 | Status: SHIPPED | OUTPATIENT
Start: 2021-07-30 | End: 2021-08-09

## 2021-08-23 DIAGNOSIS — M54.12 CERVICAL RADICULOPATHY: ICD-10-CM

## 2021-08-23 DIAGNOSIS — M54.50 LUMBAR SPINE PAIN: ICD-10-CM

## 2021-08-23 RX ORDER — ACETAMINOPHEN AND CODEINE PHOSPHATE 300; 30 MG/1; MG/1
TABLET ORAL
Qty: 90 TABLET | Refills: 0 | Status: SHIPPED | OUTPATIENT
Start: 2021-08-23 | End: 2021-12-28

## 2021-09-01 DIAGNOSIS — M54.12 CERVICAL RADICULOPATHY: ICD-10-CM

## 2021-09-02 DIAGNOSIS — M54.12 CERVICAL RADICULOPATHY: ICD-10-CM

## 2021-09-02 RX ORDER — GABAPENTIN 300 MG/1
CAPSULE ORAL
Qty: 120 CAPSULE | OUTPATIENT
Start: 2021-09-02

## 2021-09-02 RX ORDER — GABAPENTIN 300 MG/1
CAPSULE ORAL
Qty: 120 CAPSULE | Refills: 5 | Status: SHIPPED
Start: 2021-09-02 | End: 2021-09-13 | Stop reason: SDUPTHER

## 2021-09-02 NOTE — TELEPHONE ENCOUNTER
Patient called stating he needs a refill of Gabapentin. He stated he is going out of town on Monday, however he didn't realize he is out of his medication. He stated he needs this refill before he leaves on Monday. He confirmed [facility] [address].    Patient can be reached at [phone].

## 2021-09-02 NOTE — TELEPHONE ENCOUNTER
I called and spoke to the pt. The pt was identified using 2 pt identifiers. A  was obtained and the pt has been getting this medication from his pcp. I relayed this information to the pt. He explained that he was originally getting this from his pcp. However, he had spoken with Dr. Tessy Edwards at one of his last appts and was told by him that we can prescribe this medication for him. When the pt when to renew his prescription, the request was sent back to his pcp and she refilled the prescription. Pt was last seen virtually in April 2021. Pt aware that the message will be forwarded to Dr. Tessy Edwards for review. Once a answer has been received he will be contacted. Mr. Luís Forman verbalized understanding and has no questions or concerns at this time.

## 2021-09-13 ENCOUNTER — OFFICE VISIT (OUTPATIENT)
Dept: INTERNAL MEDICINE CLINIC | Age: 56
End: 2021-09-13
Payer: MEDICARE

## 2021-09-13 VITALS
SYSTOLIC BLOOD PRESSURE: 150 MMHG | DIASTOLIC BLOOD PRESSURE: 98 MMHG | HEART RATE: 85 BPM | WEIGHT: 293 LBS | RESPIRATION RATE: 18 BRPM | BODY MASS INDEX: 39.68 KG/M2 | TEMPERATURE: 97 F | OXYGEN SATURATION: 98 % | HEIGHT: 72 IN

## 2021-09-13 DIAGNOSIS — F32.A DEPRESSION, UNSPECIFIED DEPRESSION TYPE: ICD-10-CM

## 2021-09-13 DIAGNOSIS — F51.04 CHRONIC INSOMNIA: ICD-10-CM

## 2021-09-13 DIAGNOSIS — M54.16 LUMBAR RADICULOPATHY: ICD-10-CM

## 2021-09-13 DIAGNOSIS — J45.909 ASTHMA, UNSPECIFIED ASTHMA SEVERITY, UNSPECIFIED WHETHER COMPLICATED, UNSPECIFIED WHETHER PERSISTENT: ICD-10-CM

## 2021-09-13 DIAGNOSIS — F10.10 ALCOHOL ABUSE: ICD-10-CM

## 2021-09-13 DIAGNOSIS — Z79.899 CONTROLLED SUBSTANCE AGREEMENT SIGNED: ICD-10-CM

## 2021-09-13 DIAGNOSIS — R52 PAIN MANAGEMENT: ICD-10-CM

## 2021-09-13 DIAGNOSIS — M79.18 CHRONIC PRIMARY MUSCULOSKELETAL PAIN: ICD-10-CM

## 2021-09-13 DIAGNOSIS — M54.12 CERVICAL RADICULOPATHY: Primary | ICD-10-CM

## 2021-09-13 DIAGNOSIS — M54.50 LUMBAR SPINE PAIN: ICD-10-CM

## 2021-09-13 DIAGNOSIS — G89.29 CHRONIC PRIMARY MUSCULOSKELETAL PAIN: ICD-10-CM

## 2021-09-13 DIAGNOSIS — I10 ESSENTIAL HYPERTENSION: ICD-10-CM

## 2021-09-13 DIAGNOSIS — M54.2 NECK PAIN: ICD-10-CM

## 2021-09-13 PROCEDURE — 99214 OFFICE O/P EST MOD 30 MIN: CPT | Performed by: NURSE PRACTITIONER

## 2021-09-13 PROCEDURE — G8427 DOCREV CUR MEDS BY ELIG CLIN: HCPCS | Performed by: NURSE PRACTITIONER

## 2021-09-13 PROCEDURE — G8753 SYS BP > OR = 140: HCPCS | Performed by: NURSE PRACTITIONER

## 2021-09-13 PROCEDURE — G8417 CALC BMI ABV UP PARAM F/U: HCPCS | Performed by: NURSE PRACTITIONER

## 2021-09-13 PROCEDURE — G8755 DIAS BP > OR = 90: HCPCS | Performed by: NURSE PRACTITIONER

## 2021-09-13 PROCEDURE — G8432 DEP SCR NOT DOC, RNG: HCPCS | Performed by: NURSE PRACTITIONER

## 2021-09-13 RX ORDER — AMLODIPINE BESYLATE 5 MG/1
5 TABLET ORAL DAILY
Qty: 30 TABLET | Refills: 1 | Status: SHIPPED | OUTPATIENT
Start: 2021-09-13 | End: 2021-10-28 | Stop reason: SDUPTHER

## 2021-09-13 NOTE — LETTER
Name:Nahum Beltre   TAJ:22/91/3496   MR #:[id number]   Provider Name:Joao Hernandez NP   *[id number]*  BSMG-491 (5/16)  Page 1 of 5 Initial Live Current Media    CONTROLLED SUBSTANCE AGREEMENT    I may be prescribed medications that are controlled substances as part  of my treatment plan for management of my medical condition(s). The goal of my treatment plan is to maintain and/or improve my health and wellbeing. Because controlled substances have an increased risk of abuse or harm, continual re-evaluation is needed determine if the goals of my treatment plan are being met for my safety and the safety of others. Ministerio Chavez  am entering into this Controlled Substance Agreement with my provider, Denyse Dubin, NP at [facility] . I understand that successful treatment requires mutual trust and honesty between me and my provider. I understand that there are state and federal laws and regulations which apply to the medications that my provider may prescribe that must be followed. I understand there are risks and benefits ts of taking the medicines that my provider may prescribe. I understand and agree that following this Agreement is necessary in continuing my provider-patient relationship and success of my treatment plan. As a part of my treatment plan, I agree to the following:    COMMUNICATION:    1. I will communicate fully with my provider about my medical condition(s), including the effect on my daily life and how well my medications are helping. I will tell my provider all of the medications that I take for any reason, including medications I receive from another health care provider, and will notify my provider about all issues, problems or concerns, including any side effects, which may be related to my medications. I understand that this information allows my provider to adjust my treatment plan to help manage my medical condition.  I understand that this information will become part of my permanent medical record. 2. I will notify my provider if I have a history of alcohol/drug misuse/addiction or if I have had treatment for alcohol/drug addiction in the past, or if I have a new problem with or concern about alcohol/drug use/addiction, because this increases the likelihood of high risk behaviors and may lead to serious medical conditions. 3. Females Only: I will notify my provider if I am or become pregnant, or if I intend to become pregnant, or if I intend to breastfeed. I understand that communication of these issues with my provider is important, due to possible effects my medication could have on an unborn fetus or breastfeeding child. Name:.Vladimir Anthony   SDA:45/[birth year]13   MR #:[id number]   Provider Name:Wood, Lyna Fothergill, NP   *[id number]*  BSMG-491 (5/16)  Page 2 of 5 Initial SMARTworks      MISUSE OF MEDICATIONS / DRUGS:    1. I agree to take all controlled substances as prescribed, and will not misuse or abuse any controlled substances prescribed by my provider. For my safety, I will not increase the amount of medicine I take without first talking with and getting permission from my provider. 2. If I have a medical emergency, another health care provider may prescribe me medication. If I seek emergency treatment, I will notify my provider within seventy-two (72) hours. 3. I understand that my provider may discuss my use and/or possible misuse/abuse of controlled substances and alcohol, as appropriate, with any health care provider involved in my care, pharmacist or legal authority. ILLEGAL DRUGS:    1. I will not use illegal drugs of any kind, including but not limited to  heroin, cocaine, or any prescription drug which is not prescribed to me. DRUG DIVERSION / PRESCRIPTION FRAUD:    1. I will not share, sell, trade, give away, or otherwise misuse my prescriptions or medications.     2. I will not alter any prescriptions provided to me by my provider. SINGLE PROVIDER:    1. I agree that all controlled substances that I take will be prescribed only by my provider (or his/her covering provider) under this Agreement. This agreement does not prevent me from seeking emergency medical treatment or receiving pain management related to a surgery. PROTECTING MEDICATIONS:    1. I am responsible for keeping my prescriptions and medications in a safe and secure place including safeguarding them from loss or theft. I understand that lost, stolen or damaged/destroyed prescriptions or medications will not be replaced. Name:.Vladimir Mendez   DIW:56/[birth year]   MR #:[id number]   Provider Name:Jordan Hernandez, NP   *[id number]*  BSMG-491 (5/16)  Page 3 of 5 Initial DrinkSendo  PRESCRIPTION RENEWALS/REFILLS:    1. I will follow my controlled substance medication schedule as prescribed by my provider. 2. I understand and agree that I will make any requests for renewals or refills of my prescriptions only at the time of an office visit or during my providers regular office hours subject to the prescription refill requirements of the individual practice. 3. I understand that my provider may not call in prescriptions for controlled substances to my pharmacy. 4. I understand that my provider may adjust or discontinue these medications as deemed appropriate for my medical treatment plan. This Agreement does not guarantee the prescription of controlled medications. 5. I agree that if my medications are adjusted or discontinued, I will properly dispose of any remaining medications. I understand that I will be required to dispose of any remaining controlled medications prior to being provided with any prescriptions for other controlled medications. 6. I understand that the renewal of my prescription depends on my medical condition, my consistent participation, and my adherence with my treatment plan and this Agreement.     7. I understand that if I do not keep an appointment with my provider, I may not receive a renewal or refill for my controlled substance medication. PRESCRIPTION MONITORING / DRUG TESTIN. I understand that my provider may require me to provide urine, saliva or blood for testing at any time. I understand that this testing will be used to monitor for safety and adherence with my treatment plan and this Agreement. 2. I understand that my provider may ask me to provide an observed urine specimen, which means that a nurse or other health care provider may watch me provide urine, and I agree to cooperate if I am asked to provide an observed specimen. 3. I understand that if I do not provide urine, saliva or blood samples within two (2) hours of my providers request, or other timeframe decided by my provider, my treatment plan could be changed, or my prescriptions and medications may be changed or ended. 4. I understand that urine, saliva and blood test results will be a part of my permanent medical record. Name:.Vladimir Presley   GAA:   MR #:[id number]   Provider Name:Danny Hernandez Ra, NP   *[id number]*  BSMG-491 ()  Page 4 of 5 Initial SMARTworks    5. I understand that my provider is required to obtain a copy of my State Prescription Monitoring Program () Report at any time in order to safely prescribe medications. 6. I will bring all of my prescribed controlled substance medications in their original bottles to all of my scheduled appointments. 7. I understand that my provider may ask me to come to the practice with all of my prescribed medications for a random pill count at any time. I agree to cooperate if I am asked to come in for a random pill count. I understand that if I do not arrive in the timeframe decided by my provider, my treatment plan could be changed, or my prescriptions and medications may be changed or ended.     COOPERATION WITH INVESTIGATIONS:    1. I authorize my provider and my pharmacy to cooperate fully with any local, state, or federal law enforcement agency in the investigation of any possible misuse, sale, or other diversion of my controlled substance prescriptions or medications. RISKS:    1. I understand that my level of consciousness may be affected from the use of controlled substances, and I understand that there are risks, benefits, effects and potential alternatives (including no treatment) to the medications that my provider has prescribed. 2. I understand that I may become drowsy, tired, dizzy, constipated, and sick to my stomach, or have changes in my mood or in my sleep while taking my medications. I have talked with my provider about these possible side effects, risks, benefits, and alternative treatments, and my provider has answered all of my questions. 3. I understand that I should not suddenly stop taking my medications without first speaking with my provider. I understand that if I suddenly stop taking my medications, I may experience nausea, vomiting, sweating,anxiety, sleeplessness, itching or other uncomfortable feelings. 4. I will not take my medications with alcohol of any kind, including beer, wine or liquor. I understand that drinking alcohol with my medications increases the chances of side effects, including breathing problems or even death. 5. I understand that if I have a history of alcoholism or other drug addiction I may be at increased risk of addiction to my medications. Signs of addiction might include craving, compulsive use, and continued use despite harm. Since addiction is a disease, I understand my provider may decide to change my medications and refer me to appropriate treatment services. I understand that this information would become part of my permanent medical record.     Name:.Vladimir ALCARAZ Mojgan Margaret   EEE:43/84/8045   MR #:[id number]   Provider Name:Sami Hernandez NP   *[id number]*  BSMG-491 (5/16)  Page 5 of 5 Initial Moodswing      6. Females only: Children born to women who regularly take controlled substances are likely to have physical problems and suffer withdrawal symptoms at birth. If I am of child-bearing age, I understand that I should use safe and effective birth control while taking any controlled substances to avoid the impact of medications on an unborn fetus or  child. I agree to notify my provider immediately if I should become pregnant so that my treatment plan can be adjusted. 7. Males only: I understand that chronic use of controlled substances has been associated with low testosterone levels in males which may affect my mood, stamina, sexual desire, and general health. I understand that my provider may order the appropriate laboratory test to determine my testosterone level,and I agree to this testing. ADHERENCE:    1. I understand that if I do not adhere to this Agreement in any way, my provider may change my prescriptions, stop prescribing controlled substances or end our provider-patient relationship. 2. If my provider decides to stop prescribing medication, or decides to end our provider-patient relationship,my provider may require that I taper my medications slowly. If necessary, my provider may also provide a prescription for other medications to treat my withdrawal symptoms. UNDERSTANDING THIS AGREEMENT:    I understand that my provider may adjust or stop my prescriptions for controlled substances based on my medical condition and my treatment plan. I understand that this Agreement does not guarantee that I will be prescribed medications or controlled substances. I understand that controlled substances may be just one part  of my treatment plan. My initial on each page and my signature below shows that I have read each page of this Agreement, I have had an opportunity to ask questions, and all of my questions have been answered to my satisfaction by my provider.     By signing below, I agree to comply with this Agreement, and I understand that if I do not follow the Agreements listed above, my provider may stop        _________________________________________  Date/Time 9/13/2021 2:27 PM                 (Patient Signature)

## 2021-09-13 NOTE — PATIENT INSTRUCTIONS
High Blood Pressure: Care Instructions  Overview     It's normal for blood pressure to go up and down throughout the day. But if it stays up, you have high blood pressure. Another name for high blood pressure is hypertension. Despite what a lot of people think, high blood pressure usually doesn't cause headaches or make you feel dizzy or lightheaded. It usually has no symptoms. But it does increase your risk of stroke, heart attack, and other problems. You and your doctor will talk about your risks of these problems based on your blood pressure. Your doctor will give you a goal for your blood pressure. Your goal will be based on your health and your age. Lifestyle changes, such as eating healthy and being active, are always important to help lower blood pressure. You might also take medicine to reach your blood pressure goal.  Follow-up care is a key part of your treatment and safety. Be sure to make and go to all appointments, and call your doctor if you are having problems. It's also a good idea to know your test results and keep a list of the medicines you take. How can you care for yourself at home? Medical treatment  · If you stop taking your medicine, your blood pressure will go back up. You may take one or more types of medicine to lower your blood pressure. Be safe with medicines. Take your medicine exactly as prescribed. Call your doctor if you think you are having a problem with your medicine. · Talk to your doctor before you start taking aspirin every day. Aspirin can help certain people lower their risk of a heart attack or stroke. But taking aspirin isn't right for everyone, because it can cause serious bleeding. · See your doctor regularly. You may need to see the doctor more often at first or until your blood pressure comes down. · If you are taking blood pressure medicine, talk to your doctor before you take decongestants or anti-inflammatory medicine, such as ibuprofen.  Some of these medicines can raise blood pressure. · Learn how to check your blood pressure at home. Lifestyle changes  · Stay at a healthy weight. This is especially important if you put on weight around the waist. Losing even 10 pounds can help you lower your blood pressure. · If your doctor recommends it, get more exercise. Walking is a good choice. Bit by bit, increase the amount you walk every day. Try for at least 30 minutes on most days of the week. You also may want to swim, bike, or do other activities. · Avoid or limit alcohol. Talk to your doctor about whether you can drink any alcohol. · Try to limit how much sodium you eat to less than 2,300 milligrams (mg) a day. Your doctor may ask you to try to eat less than 1,500 mg a day. · Eat plenty of fruits (such as bananas and oranges), vegetables, legumes, whole grains, and low-fat dairy products. · Lower the amount of saturated fat in your diet. Saturated fat is found in animal products such as milk, cheese, and meat. Limiting these foods may help you lose weight and also lower your risk for heart disease. · Do not smoke. Smoking increases your risk for heart attack and stroke. If you need help quitting, talk to your doctor about stop-smoking programs and medicines. These can increase your chances of quitting for good. When should you call for help? Call  911 anytime you think you may need emergency care. This may mean having symptoms that suggest that your blood pressure is causing a serious heart or blood vessel problem. Your blood pressure may be over 180/120. For example, call 911 if:    · You have symptoms of a heart attack. These may include:  ? Chest pain or pressure, or a strange feeling in the chest.  ? Sweating. ? Shortness of breath. ? Nausea or vomiting. ? Pain, pressure, or a strange feeling in the back, neck, jaw, or upper belly or in one or both shoulders or arms. ? Lightheadedness or sudden weakness.   ? A fast or irregular heartbeat.     · You have symptoms of a stroke. These may include:  ? Sudden numbness, tingling, weakness, or loss of movement in your face, arm, or leg, especially on only one side of your body. ? Sudden vision changes. ? Sudden trouble speaking. ? Sudden confusion or trouble understanding simple statements. ? Sudden problems with walking or balance. ? A sudden, severe headache that is different from past headaches.     · You have severe back or belly pain. Do not wait until your blood pressure comes down on its own. Get help right away. Call your doctor now or seek immediate care if:    · Your blood pressure is much higher than normal (such as 180/120 or higher), but you don't have symptoms.     · You think high blood pressure is causing symptoms, such as:  ? Severe headache.  ? Blurry vision. Watch closely for changes in your health, and be sure to contact your doctor if:    · Your blood pressure measures higher than your doctor recommends at least 2 times. That means the top number is higher or the bottom number is higher, or both.     · You think you may be having side effects from your blood pressure medicine. Where can you learn more? Go to http://www.gray.com/  Enter [id number] in the search box to learn more about \"High Blood Pressure: Care Instructions. \"  Current as of: August 31, 2020               Content Version: 12.8  © 2006-2021 [employer]. Care instructions adapted under license by [employer] (which disclaims liability or warranty for this information). If you have questions about a medical condition or this instruction, always ask your healthcare professional. [place] [address] any warranty or liability for your use of this information.

## 2021-09-13 NOTE — PROGRESS NOTES
ROOM # 2  Identified pt with two pt identifiers(name and ). Reviewed record in preparation for visit and have obtained necessary documentation. Chief Complaint   Patient presents with   [address] preferred language for health care discussion is english/other. Is the patient using any DME equipment during OV? Alan De Santiago is due for:  Health Maintenance Due   Topic    Pneumococcal 0-64 years (1 of 2 - PPSV23)    Shingrix Vaccine Age 50> (1 of 2)    Medicare Yearly Exam     DTaP/Tdap/Td series (2 - Td or Tdap)    Flu Vaccine (1)     Health Maintenance reviewed and discussed per provider  Please order/place referral if appropriate. Advance Directive:  1. Do you have an advance directive in place? Patient Reply: NO    2. If not, would you like material regarding how to put one in place? NO    Coordination of Care:  1. Have you been to the ER, urgent care clinic since your last visit? Hospitalized since your last visit? NO    2. Have you seen or consulted any other health care providers outside of the [address] [place] [address] since your last visit? Include any pap smears or colon screening. NO    Patient is accompanied by self I have received verbal consent from Carl Anne to discuss any/all medical information while they are present in the room.     Learning Assessment:  Learning Assessment 2019   PRIMARY LEARNER Patient Patient   HIGHEST LEVEL OF EDUCATION - PRIMARY LEARNER  > 4 YEARS OF COLLEGE > 4 YEARS OF COLLEGE   BARRIERS PRIMARY LEARNER NONE NONE   CO-LEARNER CAREGIVER No No   PRIMARY LANGUAGE ENGLISH ENGLISH   LEARNER PREFERENCE PRIMARY DEMONSTRATION DEMONSTRATION   ANSWERED BY Patient patient   RELATIONSHIP SELF SELF     Depression Screening:  3 most recent [facility] 36 Screens 2021 10/30/2020 3/12/2020 3/9/2020 2020 2019 2019   Little interest or pleasure in doing things Nearly every day Not at all Not at all Not at all Not at all Not at all Not at all   Feeling down, depressed, irritable, or hopeless More than half the days Not at all Not at all Not at all Not at all Not at all Not at all   Total Score PHQ 2 5 0 0 0 0 0 0   Trouble falling or staying asleep, or sleeping too much Nearly every day - - - - - -   Feeling tired or having little energy Several days - - - - - -   Poor appetite, weight loss, or overeating Nearly every day - - - - - -   Feeling bad about yourself - or that you are a failure or have let yourself or your family down More than half the days - - - - - -   Trouble concentrating on things such as school, work, reading, or watching TV Nearly every day - - - - - -   Moving or speaking so slowly that other people could have noticed; or the opposite being so fidgety that others notice Not at all - - - - - -   Thoughts of being better off dead, or hurting yourself in some way Not at all - - - - - -   PHQ 9 Score 17 - - - - - -   How difficult have these problems made it for you to do your work, take care of your home and get along with others Not difficult at all - - - - - -     Recent Travel Screening and Travel History documentation     Travel Screening     Question   Response    In the last month, have you been in contact with someone who was confirmed or suspected to have Coronavirus / COVID-19? No / Unsure    Have you had a COVID-19 viral test in the last 14 days? No    Do you have any of the following new or worsening symptoms? Have you traveled internationally or domestically in the last month?   No      Travel History   Travel since 08/13/21     No documented travel since 08/13/21
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
## 2021-09-15 LAB — DRUGS UR: NORMAL

## 2021-10-08 ENCOUNTER — TELEPHONE (OUTPATIENT)
Dept: INTERNAL MEDICINE CLINIC | Age: 56
End: 2021-10-08

## 2021-10-28 ENCOUNTER — OFFICE VISIT (OUTPATIENT)
Dept: INTERNAL MEDICINE CLINIC | Age: 56
End: 2021-10-28
Payer: MEDICARE

## 2021-10-28 VITALS
RESPIRATION RATE: 18 BRPM | SYSTOLIC BLOOD PRESSURE: 143 MMHG | HEIGHT: 72 IN | TEMPERATURE: 96.9 F | BODY MASS INDEX: 39.09 KG/M2 | HEART RATE: 92 BPM | DIASTOLIC BLOOD PRESSURE: 98 MMHG | OXYGEN SATURATION: 98 % | WEIGHT: 288.6 LBS

## 2021-10-28 DIAGNOSIS — Z00.00 MEDICARE ANNUAL WELLNESS VISIT, SUBSEQUENT: ICD-10-CM

## 2021-10-28 DIAGNOSIS — Z76.0 MEDICATION REFILL: ICD-10-CM

## 2021-10-28 DIAGNOSIS — I10 ESSENTIAL HYPERTENSION: Primary | ICD-10-CM

## 2021-10-28 PROCEDURE — G8755 DIAS BP > OR = 90: HCPCS | Performed by: NURSE PRACTITIONER

## 2021-10-28 PROCEDURE — G8417 CALC BMI ABV UP PARAM F/U: HCPCS | Performed by: NURSE PRACTITIONER

## 2021-10-28 PROCEDURE — G8432 DEP SCR NOT DOC, RNG: HCPCS | Performed by: NURSE PRACTITIONER

## 2021-10-28 PROCEDURE — G8753 SYS BP > OR = 140: HCPCS | Performed by: NURSE PRACTITIONER

## 2021-10-28 PROCEDURE — G0439 PPPS, SUBSEQ VISIT: HCPCS | Performed by: NURSE PRACTITIONER

## 2021-10-28 PROCEDURE — 3017F COLORECTAL CA SCREEN DOC REV: CPT | Performed by: NURSE PRACTITIONER

## 2021-10-28 PROCEDURE — 99213 OFFICE O/P EST LOW 20 MIN: CPT | Performed by: NURSE PRACTITIONER

## 2021-10-28 PROCEDURE — G8427 DOCREV CUR MEDS BY ELIG CLIN: HCPCS | Performed by: NURSE PRACTITIONER

## 2021-10-28 RX ORDER — BUDESONIDE AND FORMOTEROL FUMARATE DIHYDRATE 80; 4.5 UG/1; UG/1
2 AEROSOL RESPIRATORY (INHALATION) 2 TIMES DAILY
Qty: 3 EACH | Refills: 3 | Status: SHIPPED | OUTPATIENT
Start: 2021-10-28 | End: 2021-11-02 | Stop reason: SDUPTHER

## 2021-10-28 RX ORDER — AMLODIPINE BESYLATE 10 MG/1
10 TABLET ORAL DAILY
Qty: 90 TABLET | Refills: 0 | Status: SHIPPED | OUTPATIENT
Start: 2021-10-28 | End: 2021-11-02 | Stop reason: SDUPTHER

## 2021-10-28 NOTE — PROGRESS NOTES
ROOM # 1  Identified pt with two pt identifiers(name and ). Reviewed record in preparation for visit and have obtained necessary documentation. Chief Complaint   Patient presents with    Hypertension      [facility] preferred language for health care discussion is english/other. Is the patient using any DME equipment during OV? Sarath Jimenes is due for:  Health Maintenance Due   Topic    Pneumococcal 0-64 years (1 of 2 - PPSV23)    Shingrix Vaccine Age 50> (1 of 2)    Medicare Yearly Exam     DTaP/Tdap/Td series (2 - Td or Tdap)     Health Maintenance reviewed and discussed per provider  Please order/place referral if appropriate. Advance Directive:  1. Do you have an advance directive in place? Patient Reply: NO    2. If not, would you like material regarding how to put one in place? NO    Coordination of Care:  1. Have you been to the ER, urgent care clinic since your last visit? Hospitalized since your last visit? NO    2. Have you seen or consulted any other health care providers outside of the [address] [place] [address] since your last visit? Include any pap smears or colon screening. NO    Patient is accompanied by self I have received verbal consent from [facility] to discuss any/all medical information while they are present in the room.     Learning Assessment:  Learning Assessment 2019   PRIMARY LEARNER Patient Patient   HIGHEST LEVEL OF EDUCATION - PRIMARY LEARNER  > 4 YEARS OF COLLEGE > 4 YEARS OF COLLEGE   BARRIERS PRIMARY LEARNER NONE NONE   CO-LEARNER CAREGIVER No No   PRIMARY LANGUAGE ENGLISH ENGLISH   LEARNER PREFERENCE PRIMARY DEMONSTRATION DEMONSTRATION   ANSWERED BY Patient patient   RELATIONSHIP SELF SELF     Depression Screening:  3 most recent [facility] Screens 2021 10/30/2020 3/12/2020 3/9/2020 2020 2019 2019   Little interest or pleasure in doing things Nearly every day Not at all Not at all Not at all Not at all Not at all Not at all   Feeling down, depressed, irritable, or hopeless More than half the days Not at all Not at all Not at all Not at all Not at all Not at all   Total Score PHQ 2 5 0 0 0 0 0 0   Trouble falling or staying asleep, or sleeping too much Nearly every day - - - - - -   Feeling tired or having little energy Several days - - - - - -   Poor appetite, weight loss, or overeating Nearly every day - - - - - -   Feeling bad about yourself - or that you are a failure or have let yourself or your family down More than half the days - - - - - -   Trouble concentrating on things such as school, work, reading, or watching TV Nearly every day - - - - - -   Moving or speaking so slowly that other people could have noticed; or the opposite being so fidgety that others notice Not at all - - - - - -   Thoughts of being better off dead, or hurting yourself in some way Not at all - - - - - -   PHQ 9 Score 17 - - - - - -   How difficult have these problems made it for you to do your work, take care of your home and get along with others Not difficult at all - - - - - -     Recent Travel Screening and Travel History documentation     Travel Screening      No screening recorded since 10/27/21 0000      Travel History   Travel since 09/28/21     No documented travel since 09/28/21

## 2021-10-28 NOTE — PROGRESS NOTES
HISTORY OF PRESENT ILLNESS  Sha Fam is a 63 y.o. male. Here for HTN management. HPI  1) HTN - amlodipine 5 mg reports compliance with regimen. average 125/92  BP Readings from Last 3 Encounters:   10/28/21 (!) 143/98   09/13/21 (!) 150/98   03/11/21 (!) 147/97     BP (!) 143/98 (BP 1 Location: Left upper arm, BP Patient Position: Sitting)   Pulse 92   Temp 96.9 °F (36.1 °C) (Temporal)   Resp 18   Ht 6' (1.829 m)   Wt 288 lb 9.6 oz (130.9 kg)   SpO2 98%   BMI 39.14 kg/m²   Current Outpatient Medications   Medication Sig Dispense Refill    budesonide-formoteroL (Symbicort) 80-4.5 mcg/actuation HFAA Take 2 Puffs by inhalation two (2) times a day. 3 Each 3    amLODIPine (NORVASC) 10 mg tablet Take 1 Tablet by mouth daily. 90 Tablet 0    colchicine (Colcrys) 0.6 mg tablet Take 2 capsules by mouth now for 1 dose. Then take 1 cap by mouth 1 hour later. Repeat for future flares. 30 Tablet 0    amitriptyline (ELAVIL) 25 mg tablet TAKE ONE TABLET BY MOUTH ONCE NIGHTLY 30 Tablet 5    tamsulosin (FLOMAX) 0.4 mg capsule TAKE ONE CAPSULE BY MOUTH DAILY AFTER DINNER 90 Cap 3    gabapentin (NEURONTIN) 300 mg capsule Take 1 cap po qam, 1 cap po every afternoon and 2 caps po qhs. 120 Cap 5    albuterol (PROVENTIL HFA, VENTOLIN HFA, PROAIR HFA) 90 mcg/actuation inhaler Take  by inhalation.  acetaminophen-codeine (TYLENOL #3) 300-30 mg per tablet TAKE ONE TABLET BY MOUTH THREE TIMES A DAY AS NEEDED FOR PAIN FOR UP TO 30 DAYS *MAX OF 3 DAILY 90 Tablet 0    naloxone (NARCAN) 4 mg/actuation nasal spray Use 1 spray intranasally, then discard. Repeat with new spray every 2 min as needed for opioid overdose symptoms, alternating nostrils. (Patient not taking: Reported on 8/19/2021) 2 Each 0       Review of Systems   Constitutional: Negative for chills, fever and malaise/fatigue. Eyes: Negative for blurred vision and double vision. Respiratory: Negative for cough, shortness of breath and wheezing. Cardiovascular: Negative for chest pain, palpitations and leg swelling. Neurological: Negative for dizziness and headaches. Physical Exam  Vitals and nursing note reviewed. Constitutional:       General: He is not in acute distress. Appearance: He is obese. He is not ill-appearing. HENT:      Head: Normocephalic. Right Ear: External ear normal.      Left Ear: External ear normal.      Mouth/Throat:      Comments: MASK  Eyes:      General: No scleral icterus. Extraocular Movements: Extraocular movements intact. Conjunctiva/sclera: Conjunctivae normal.      Pupils: Pupils are equal, round, and reactive to light. Cardiovascular:      Rate and Rhythm: Normal rate and regular rhythm. Pulses: Normal pulses. Heart sounds: Normal heart sounds. Pulmonary:      Effort: Pulmonary effort is normal. No respiratory distress. Breath sounds: Normal breath sounds. No wheezing. Musculoskeletal:         General: Normal range of motion. Cervical back: Normal range of motion. Right lower leg: No edema. Left lower leg: No edema. Lymphadenopathy:      Cervical: No cervical adenopathy. Skin:     General: Skin is warm and dry. Findings: No lesion or rash. Neurological:      General: No focal deficit present. Mental Status: He is alert and oriented to person, place, and time. Mental status is at baseline. Psychiatric:         Mood and Affect: Mood normal.         Behavior: Behavior normal.         Thought Content: Thought content normal.         Judgment: Judgment normal.         ASSESSMENT and PLAN  Diagnoses and all orders for this visit:    1. Essential hypertension  -     amLODIPine (NORVASC) 10 mg tablet; Take 1 Tablet by mouth daily. 2. Medication refill  -     budesonide-formoteroL (Symbicort) 80-4.5 mcg/actuation HFAA; Take 2 Puffs by inhalation two (2) times a day.      BP not at goal Increase to Amlodipine 10 mg     Follow-up and Dispositions · Return in about 3 months (around 1/28/2022) for HTN. This is the Subsequent Medicare Annual Wellness Exam, performed 12 months or more after the Initial AWV or the last Subsequent AWV    I have reviewed the patient's medical history in detail and updated the computerized patient record. Assessment/Plan   Education and counseling provided:  Are appropriate based on today's review and evaluation    3. Medicare annual wellness visit, subsequent       Depression Risk Factor Screening     3 most recent PHQ Screens 9/13/2021   Little interest or pleasure in doing things Nearly every day   Feeling down, depressed, irritable, or hopeless More than half the days   Total Score PHQ 2 5   Trouble falling or staying asleep, or sleeping too much Nearly every day   Feeling tired or having little energy Several days   Poor appetite, weight loss, or overeating Nearly every day   Feeling bad about yourself - or that you are a failure or have let yourself or your family down More than half the days   Trouble concentrating on things such as school, work, reading, or watching TV Nearly every day   Moving or speaking so slowly that other people could have noticed; or the opposite being so fidgety that others notice Not at all   Thoughts of being better off dead, or hurting yourself in some way Not at all   PHQ 9 Score 17   How difficult have these problems made it for you to do your work, take care of your home and get along with others Not difficult at all       Alcohol Risk Screen    Do you average more than 2 drinks per night or 14 drinks a week: Yes    On any one occasion in the past three months have you have had more than 4 drinks containing alcohol:  Yes        Functional Ability and Level of Safety    Hearing: hearing loss in both ears, working on obtaining hearing aides      Activities of Daily Living: The home contains: no safety equipment.   Patient does total self care      Ambulation: with mild difficulty with distance walking      Fall Risk:  No flowsheet data found. Abuse Screen:  Patient is not abused       Cognitive Screening    Has your family/caregiver stated any concerns about your memory: no     Cognitive Screening: Normal - Mini Cog Test    Health Maintenance Due     Health Maintenance Due   Topic Date Due    Pneumococcal 0-64 years (1 of 2 - PPSV23) Never done    Shingrix Vaccine Age 50> (1 of 2) Never done    DTaP/Tdap/Td series (2 - Td or Tdap) 07/20/2021       Patient Care Team   Patient Care Team:  Lasha Quiroga NP as PCP - General (Nurse Practitioner)  Lasha Quiroga NP as PCP - [facility] Empaneled Provider    History     Patient Active Problem List   Diagnosis Code    Lumbar spine pain M54.50    Neck pain M54.2    Urge incontinence N39.41    Neuralgia M79.2    Hearing loss H91.90    Gout M10.9    Benign prostatic hyperplasia with lower urinary tract symptoms N40.1    Screening for depression Z13.31    Chronic midline low back pain without sciatica M54.50, G89.29    Advanced care planning/counseling discussion Z71.89    Severe obesity (BMI 35.0-39. 9) with comorbidity ([facility] [address] E66.01    Essential hypertension I10    Hyperlipidemia E78.5    Cervical radiculopathy M54.12    Insomnia G47.00    CVA (cerebral vascular accident) ([facility] [address] I63.9    Asthma J45.909    Alcoholism ([facility] [address] F10.20    Chronic primary musculoskeletal pain M79.18, G89.29     Past Medical History:   Diagnosis Date    Asthma     Benign prostatic hyperplasia with lower urinary tract symptoms     Benign prostatic hyperplasia with urinary obstruction     Cervicalgia     Chronic pain     Elevated PSA     Flank pain     Gout     Head ache     Hearing loss     Hypertrophy of prostate with urinary obstruction and other lower urinary tract symptoms (LUTS)     Kidney stones     Lumbago     Neuralgia     Testicular abnormality     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 Betty Diamond EXTRACTION       Current Outpatient Medications   Medication Sig Dispense Refill    budesonide-formoteroL (Symbicort) 80-4.5 mcg/actuation HFAA Take 2 Puffs by inhalation two (2) times a day. 3 Each 3    amLODIPine (NORVASC) 10 mg tablet Take 1 Tablet by mouth daily. 90 Tablet 0    colchicine (Colcrys) 0.6 mg tablet Take 2 capsules by mouth now for 1 dose. Then take 1 cap by mouth 1 hour later. Repeat for future flares. 30 Tablet 0    amitriptyline (ELAVIL) 25 mg tablet TAKE ONE TABLET BY MOUTH ONCE NIGHTLY 30 Tablet 5    tamsulosin (FLOMAX) 0.4 mg capsule TAKE ONE CAPSULE BY MOUTH DAILY AFTER DINNER 90 Cap 3    gabapentin (NEURONTIN) 300 mg capsule Take 1 cap po qam, 1 cap po every afternoon and 2 caps po qhs. 120 Cap 5    albuterol (PROVENTIL HFA, VENTOLIN HFA, PROAIR HFA) 90 mcg/actuation inhaler Take  by inhalation.  acetaminophen-codeine (TYLENOL #3) 300-30 mg per tablet TAKE ONE TABLET BY MOUTH THREE TIMES A DAY AS NEEDED FOR PAIN FOR UP TO 30 DAYS *MAX OF 3 DAILY 90 Tablet 0    naloxone (NARCAN) 4 mg/actuation nasal spray Use 1 spray intranasally, then discard. Repeat with new spray every 2 min as needed for opioid overdose symptoms, alternating nostrils. (Patient not taking: Reported on 8/19/2021) 2 Each 0     Allergies   Allergen Reactions    Bee Venom Protein (Honey Bee) Swelling       Family History   Problem Relation Age of Onset    Cancer Mother     No Known Problems Father     Asthma Sister     Stroke Maternal Grandmother      Social History     Tobacco Use    Smoking status: Former Smoker    Smokeless tobacco: Never Used   Substance Use Topics    Alcohol use:  Yes     Alcohol/week: 2.0 standard drinks     Types: 2 Shots of liquor per week     Comment: daily         Emily Christian NP

## 2021-10-28 NOTE — PATIENT INSTRUCTIONS
Medicare Wellness Visit, Male    The best way to live healthy is to have a lifestyle where you eat a well-balanced diet, exercise regularly, limit alcohol use, and quit all forms of tobacco/nicotine, if applicable. Regular preventive services are another way to keep healthy. Preventive services (vaccines, screening tests, monitoring & exams) can help personalize your care plan, which helps you manage your own care. Screening tests can find health problems at the earliest stages, when they are easiest to treat. [facility] follows the current, evidence-based guidelines published by the [facility] ([facility]) when recommending preventive services for our patients. Because we follow these guidelines, sometimes recommendations change over time as research supports it. (For example, a prostate screening blood test is no longer routinely recommended for men with no symptoms). Of course, you and your doctor may decide to screen more often for some diseases, based on your risk and co-morbidities (chronic disease you are already diagnosed with). Preventive services for you include:  - Medicare offers their members a free annual wellness visit, which is time for you and your primary care provider to discuss and plan for your preventive service needs. Take advantage of this benefit every year!  -All adults over age 72 should receive the recommended pneumonia vaccines. Current USPSTF guidelines recommend a series of two vaccines for the best pneumonia protection.   -All adults should have a flu vaccine yearly and tetanus vaccine every 10 years.  -All adults age 48 and older should receive the shingles vaccines (series of two vaccines).        -All adults age 38-68 who are overweight should have a diabetes screening test once every three years.   -Other screening tests & preventive services for persons with diabetes include: an eye exam to screen for diabetic retinopathy, a kidney function test, a foot exam, and stricter control over your cholesterol.   -Cardiovascular screening for adults with routine risk involves an electrocardiogram (ECG) at intervals determined by the provider.   -Colorectal cancer screening should be done for adults age 54-65 with no increased risk factors for colorectal cancer. There are a number of acceptable methods of screening for this type of cancer. Each test has its own benefits and drawbacks. Discuss with your provider what is most appropriate for you during your annual wellness visit. The different tests include: colonoscopy (considered the best screening method), a fecal occult blood test, a fecal DNA test, and sigmoidoscopy.  -All adults born between [facility] should be screened once for Hepatitis C.  -An Abdominal Aortic Aneurysm (AAA) Screening is recommended for men age 73-68 who has ever smoked in their lifetime.      Here is a list of your current Health Maintenance items (your personalized list of preventive services) with a due date:  Health Maintenance Due   Topic Date Due    Pneumococcal Vaccine (1 of 2 - PPSV23) Never done    Shingles Vaccine (1 of 2) Never done    DTaP/Tdap/Td  (2 - Td or Tdap) 07/20/2021

## 2021-11-02 DIAGNOSIS — I10 ESSENTIAL HYPERTENSION: ICD-10-CM

## 2021-11-02 DIAGNOSIS — Z76.0 MEDICATION REFILL: ICD-10-CM

## 2021-11-02 NOTE — TELEPHONE ENCOUNTER
Pharmacy fax request for new 90 day prescriptions to patient's mail order pharmacy. Requested Prescriptions     Pending Prescriptions Disp Refills    budesonide-formoteroL (Symbicort) 80-4.5 mcg/actuation HFAA 3 Each 3     Sig: Take 2 Puffs by inhalation two (2) times a day.  amLODIPine (NORVASC) 10 mg tablet 90 Tablet 0     Sig: Take 1 Tablet by mouth daily.

## 2021-11-03 RX ORDER — BUDESONIDE AND FORMOTEROL FUMARATE DIHYDRATE 80; 4.5 UG/1; UG/1
2 AEROSOL RESPIRATORY (INHALATION) 2 TIMES DAILY
Qty: 3 EACH | Refills: 3 | Status: SHIPPED | OUTPATIENT
Start: 2021-11-03

## 2021-11-03 RX ORDER — AMLODIPINE BESYLATE 10 MG/1
10 TABLET ORAL DAILY
Qty: 90 TABLET | Refills: 0 | Status: SHIPPED | OUTPATIENT
Start: 2021-11-03 | End: 2022-04-27 | Stop reason: SDUPTHER

## 2021-12-07 ENCOUNTER — OFFICE VISIT (OUTPATIENT)
Dept: ORTHOPEDIC SURGERY | Age: 56
End: 2021-12-07
Payer: OTHER MISCELLANEOUS

## 2021-12-07 VITALS
OXYGEN SATURATION: 96 % | HEIGHT: 72 IN | WEIGHT: 291 LBS | HEART RATE: 89 BPM | SYSTOLIC BLOOD PRESSURE: 142 MMHG | BODY MASS INDEX: 39.42 KG/M2 | DIASTOLIC BLOOD PRESSURE: 93 MMHG | TEMPERATURE: 97.5 F

## 2021-12-07 DIAGNOSIS — M54.2 NECK PAIN: ICD-10-CM

## 2021-12-07 DIAGNOSIS — G56.23 CUBITAL TUNNEL SYNDROME OF BOTH UPPER EXTREMITIES: ICD-10-CM

## 2021-12-07 DIAGNOSIS — E66.01 SEVERE OBESITY (BMI 35.0-35.9 WITH COMORBIDITY) (HCC): ICD-10-CM

## 2021-12-07 DIAGNOSIS — G57.11 MERALGIA PARAESTHETICA, RIGHT: ICD-10-CM

## 2021-12-07 DIAGNOSIS — R20.2 NUMBNESS AND TINGLING IN BOTH HANDS: ICD-10-CM

## 2021-12-07 DIAGNOSIS — M79.18 CHRONIC PRIMARY MUSCULOSKELETAL PAIN: Primary | ICD-10-CM

## 2021-12-07 DIAGNOSIS — M79.18 MYOFASCIAL PAIN: ICD-10-CM

## 2021-12-07 DIAGNOSIS — M54.50 LUMBAR SPINE PAIN: ICD-10-CM

## 2021-12-07 DIAGNOSIS — G89.29 CHRONIC PRIMARY MUSCULOSKELETAL PAIN: Primary | ICD-10-CM

## 2021-12-07 DIAGNOSIS — R20.0 NUMBNESS AND TINGLING IN BOTH HANDS: ICD-10-CM

## 2021-12-07 DIAGNOSIS — M54.12 CERVICAL RADICULOPATHY: ICD-10-CM

## 2021-12-07 PROCEDURE — 99213 OFFICE O/P EST LOW 20 MIN: CPT | Performed by: PHYSICAL MEDICINE & REHABILITATION

## 2021-12-07 RX ORDER — AMITRIPTYLINE HYDROCHLORIDE 25 MG/1
75 TABLET, FILM COATED ORAL
Qty: 90 TABLET | Refills: 2 | Status: SHIPPED | OUTPATIENT
Start: 2021-12-07 | End: 2022-06-08
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
## 2021-12-07 NOTE — PROGRESS NOTES
Jose David Grossman presents today for   Chief Complaint   Patient presents with    Follow-up     medication       Is someone accompanying this pt? no    Is the patient using any DME equipment during OV? no    Depression Screening:  3 most recent PHQ Screens 9/13/2021   Little interest or pleasure in doing things Nearly every day   Feeling down, depressed, irritable, or hopeless More than half the days   Total Score PHQ 2 5   Trouble falling or staying asleep, or sleeping too much Nearly every day   Feeling tired or having little energy Several days   Poor appetite, weight loss, or overeating Nearly every day   Feeling bad about yourself - or that you are a failure or have let yourself or your family down More than half the days   Trouble concentrating on things such as school, work, reading, or watching TV Nearly every day   Moving or speaking so slowly that other people could have noticed; or the opposite being so fidgety that others notice Not at all   Thoughts of being better off dead, or hurting yourself in some way Not at all   PHQ 9 Score 17   How difficult have these problems made it for you to do your work, take care of your home and get along with others Not difficult at all       Learning Assessment:  Learning Assessment 8/9/2019   PRIMARY LEARNER Patient   HIGHEST LEVEL OF EDUCATION - PRIMARY LEARNER  > 4 YEARS [address] CAREGIVER No   PRIMARY LANGUAGE ENGLISH   LEARNER PREFERENCE PRIMARY DEMONSTRATION   ANSWERED BY Patient   RELATIONSHIP SELF       Abuse Screening:  No flowsheet data found. Fall Risk  No flowsheet data found. OPIOID RISK TOOL  No flowsheet data found. Coordination of Care:  1. Have you been to the ER, urgent care clinic since your last visit? no  Hospitalized since your last visit? no    2.  Have you seen or consulted any other health care providers outside of the [address] [place] [address] since your last visit? no Include any pap smears or colon screening.  no

## 2021-12-07 NOTE — PATIENT INSTRUCTIONS
Amitriptyline Daily Instructions:  Days 1 to 3: Take one 25 mg pill at night  Days 4 to 6: Take two 25 mg pills at night  After day 6: Take three 25 mg pills at night     Electromyogram (EMG) and Nerve Conduction Studies: About These Tests  What are they? An electromyogram (EMG) measures the electrical activity of your muscles when you are not using them (at rest) and when you tighten them (muscle contraction). Nerve conduction studies (NCS) measure how well and how fast the nerves can send electrical signals. EMG and nerve conduction studies are often done together. If they are done together, the nerve conduction studies are done before the EMG. Why are they done? You may need an EMG to find diseases that damage your muscles or nerves or to find why you can't move your muscles (paralysis), why they feel weak, or why they twitch. These problems may include a herniated disc, amyotrophic lateral sclerosis (ALS), or myasthenia gravis (MG). You may need nerve conduction studies to find damage to the nerves that lead from the brain and spinal cord to the rest of the body. (This is called the peripheral nervous system.) These studies are often used to help find nerve disorders, such as carpal tunnel syndrome. How do you prepare for these tests?    · Wear loose-fitting clothing. You may be given a hospital gown to wear.     · The electrodes for the test are attached to your skin. Your skin needs to be clean and free of sprays, oils, creams, and lotions.     · You may be asked to sign a consent form that says you understand the risks of the test and agree to have it done. · Tell your doctor ALL the medicines, vitamins, supplements, and herbal remedies you take.  Some may increase the risk of problems during your test. Your doctor will tell you if you should stop taking any of them before the test and how soon to do it.     · If you take aspirin or some other blood thinner, ask your doctor if you should stop taking it before your test. Make sure that you understand exactly what your doctor wants you to do. These medicines increase the risk of bleeding. How are the tests done? You lie on a table or bed or sit in a reclining chair so your muscles are relaxed. For an EMG:   · Your doctor will insert a needle electrode into a muscle. This will record the electrical activity while the muscle is at rest.  · Your doctor will ask you to tighten the same muscle slowly and steadily while the electrical activity is recorded. · Your doctor may move the electrode to a different area of the muscle or a different muscle. For nerve conduction studies:   · Your doctor will attach two types of electrodes to your skin. ? One type of electrode is placed over a nerve and will give the nerve an electrical pulse. ? The other type of electrode is placed over the muscle that the nerve controls. It will record how long it takes the muscle to react to the electrical pulse. How does having electromyogram (EMG) and nerve conduction studies feel? During an EMG test, you may feel a quick, sharp pain when the needle electrode is put into a muscle. With nerve conduction studies, you will be able to feel the electrical pulses. The tests make some people anxious. Keep in mind that only a very low-voltage electrical current is used. And each electrical pulse is very quick. It lasts less than a second. How long do they take? · An EMG may take 30 to 60 minutes. · Nerve conduction tests may take from 15 minutes to 1 hour or more. It depends on how many nerves and muscles your doctor tests. What happens after these tests? · After the test, you may be sore and feel a tingling in your muscles. This may last for up to 2 days. · If any of the test areas are sore:  ? Put ice or a cold pack on the area for 10 to 20 minutes at a time. Put a thin cloth between the ice and your skin.   ? Take an over-the-counter pain medicine, such as acetaminophen (Tylenol), ibuprofen (Advil, Motrin), or naproxen (Aleve). Be safe with medicines. Read and follow all instructions on the label. · You will probably be able to go home right away. It depends on the reason for the test.  · You can go back to your usual activities right away. When should you call for help? Watch closely for changes in your health, and be sure to contact your doctor if:  · Muscle pain from an EMG test gets worse or you have swelling, tenderness, or pus at any of the needle sites. · You have any problems that you think may be from the test.  · You have any questions about the test or have not received your results. Follow-up care is a key part of your treatment and safety. Be sure to make and go to all appointments, and call your doctor if you are having problems. It's also a good idea to keep a list of the medicines you take. Ask your doctor when you can expect to have your test results. Where can you learn more? Go to http://www.gray.com/  Enter [id number] in the search box to learn more about \"Electromyogram (EMG) and Nerve Conduction Studies: About These Tests. \"  Current as of: April 8, 2021               Content Version: 13.0  © 2006-2021 [employer]. Care instructions adapted under license by [employer] (which disclaims liability or warranty for this information). If you have questions about a medical condition or this instruction, always ask your healthcare professional. [place] [address] any warranty or liability for your use of this information.

## 2021-12-14 DIAGNOSIS — R20.2 NUMBNESS AND TINGLING IN BOTH HANDS: ICD-10-CM

## 2021-12-14 DIAGNOSIS — R20.0 NUMBNESS AND TINGLING IN BOTH HANDS: ICD-10-CM

## 2021-12-23 DIAGNOSIS — M54.50 LUMBAR SPINE PAIN: ICD-10-CM

## 2021-12-23 DIAGNOSIS — M54.12 CERVICAL RADICULOPATHY: ICD-10-CM

## 2021-12-28 RX ORDER — ACETAMINOPHEN AND CODEINE PHOSPHATE 300; 30 MG/1; MG/1
TABLET ORAL
Qty: 90 TABLET | Refills: 0 | Status: SHIPPED | OUTPATIENT
Start: 2021-12-28 | End: 2022-01-26 | Stop reason: SDUPTHER

## 2022-01-03 ENCOUNTER — TELEPHONE (OUTPATIENT)
Dept: INTERNAL MEDICINE CLINIC | Age: 57
End: 2022-01-03

## 2022-01-04 NOTE — TELEPHONE ENCOUNTER
2nd request for PA received with additional information for the PA since it is through Workers Comp.

## 2022-01-11 NOTE — TELEPHONE ENCOUNTER
Patient would like to speak to the nurse in reference to this PA. States this workers comp case has jes ongoing and this has never been an issue. The pharmacy keeps telling him to call here.

## 2022-01-12 NOTE — TELEPHONE ENCOUNTER
Spoke with patient and lm for w/c  . They will forward request for the medication to be approved. Then they will let the pharmacy know once med I approved.

## 2022-01-26 ENCOUNTER — HOSPITAL ENCOUNTER (OUTPATIENT)
Dept: LAB | Age: 57
Discharge: HOME | End: 2022-01-26
Payer: MEDICARE

## 2022-01-26 ENCOUNTER — OFFICE VISIT (OUTPATIENT)
Dept: INTERNAL MEDICINE CLINIC | Age: 57
End: 2022-01-26
Payer: MEDICARE

## 2022-01-26 VITALS
DIASTOLIC BLOOD PRESSURE: 80 MMHG | RESPIRATION RATE: 18 BRPM | WEIGHT: 289.8 LBS | OXYGEN SATURATION: 97 % | HEIGHT: 72 IN | SYSTOLIC BLOOD PRESSURE: 125 MMHG | BODY MASS INDEX: 39.25 KG/M2 | TEMPERATURE: 96.8 F | HEART RATE: 87 BPM

## 2022-01-26 DIAGNOSIS — G89.29 CHRONIC PRIMARY MUSCULOSKELETAL PAIN: ICD-10-CM

## 2022-01-26 DIAGNOSIS — M79.18 CHRONIC PRIMARY MUSCULOSKELETAL PAIN: ICD-10-CM

## 2022-01-26 DIAGNOSIS — Z12.11 SCREENING FOR COLON CANCER: ICD-10-CM

## 2022-01-26 DIAGNOSIS — I10 ESSENTIAL HYPERTENSION: Primary | ICD-10-CM

## 2022-01-26 DIAGNOSIS — E66.01 SEVERE OBESITY (BMI 35.0-35.9 WITH COMORBIDITY) (HCC): ICD-10-CM

## 2022-01-26 DIAGNOSIS — M54.12 CERVICAL RADICULOPATHY: ICD-10-CM

## 2022-01-26 DIAGNOSIS — R07.89 CHEST DISCOMFORT: ICD-10-CM

## 2022-01-26 DIAGNOSIS — M54.50 LUMBAR SPINE PAIN: ICD-10-CM

## 2022-01-26 PROCEDURE — 93005 ELECTROCARDIOGRAM TRACING: CPT

## 2022-01-26 PROCEDURE — G8432 DEP SCR NOT DOC, RNG: HCPCS | Performed by: NURSE PRACTITIONER

## 2022-01-26 PROCEDURE — 3017F COLORECTAL CA SCREEN DOC REV: CPT | Performed by: NURSE PRACTITIONER

## 2022-01-26 PROCEDURE — 99214 OFFICE O/P EST MOD 30 MIN: CPT | Performed by: NURSE PRACTITIONER

## 2022-01-26 PROCEDURE — G8427 DOCREV CUR MEDS BY ELIG CLIN: HCPCS | Performed by: NURSE PRACTITIONER

## 2022-01-26 PROCEDURE — G8754 DIAS BP LESS 90: HCPCS | Performed by: NURSE PRACTITIONER

## 2022-01-26 PROCEDURE — G8417 CALC BMI ABV UP PARAM F/U: HCPCS | Performed by: NURSE PRACTITIONER

## 2022-01-26 PROCEDURE — G8752 SYS BP LESS 140: HCPCS | Performed by: NURSE PRACTITIONER

## 2022-01-26 RX ORDER — ACETAMINOPHEN AND CODEINE PHOSPHATE 300; 30 MG/1; MG/1
1 TABLET ORAL
Qty: 90 TABLET | Refills: 0 | Status: SHIPPED | OUTPATIENT
Start: 2022-01-26 | End: 2022-07-14 | Stop reason: SDUPTHER

## 2022-01-26 NOTE — PROGRESS NOTES
ROOM # 1  Identified pt with two pt identifiers(name and ). Reviewed record in preparation for visit and have obtained necessary documentation. Chief Complaint   Patient presents with    Hypertension      Rip Lazaro preferred language for health care discussion is english/other. Is the patient using any DME equipment during OV? Anabel Yin is due for:  Health Maintenance Due   Topic    Pneumococcal 0-64 years (1 of 2 - PPSV23)    Shingrix Vaccine Age 50> (1 of 2)    DTaP/Tdap/Td series (2 - Td or Tdap)    COVID-19 Vaccine (3 - Booster for Moderna series)    Colorectal Cancer Screening Combo      Health Maintenance reviewed and discussed per provider  Please order/place referral if appropriate. 1. For patients aged 39-70: Has the patient had a colonoscopy? Yes - no Care Gap present   If the patient is female:    2. For patients aged 41-77: Has the patient had a mammogram within the past 2 years? No    3. For patients aged 21-65: Has the patient had a pap smear? No  Advance Directive:  1. Do you have an advance directive in place? Patient Reply: NO    2. If not, would you like material regarding how to put one in place? NO    Coordination of Care:  1. Have you been to the ER, urgent care clinic since your last visit? Hospitalized since your last visit? NO    2. Have you seen or consulted any other health care providers outside of the [address] [place] [address] since your last visit? Include any pap smears or colon screening. NO    Patient is accompanied by self I have received verbal consent from Rip Lazaro to discuss any/all medical information while they are present in the room.     Learning Assessment:  Learning Assessment 2019   PRIMARY LEARNER Patient Patient   HIGHEST LEVEL OF EDUCATION - PRIMARY LEARNER  > 4 YEARS OF COLLEGE > 4 YEARS OF COLLEGE   BARRIERS PRIMARY LEARNER NONE NONE   CO-LEARNER CAREGIVER No No   PRIMARY LANGUAGE ENGLISH ENGLISH   LEARNER PREFERENCE PRIMARY DEMONSTRATION DEMONSTRATION   ANSWERED BY Patient patient   RELATIONSHIP SELF SELF     Depression Screening:  3 most recent PHQ Screens 9/13/2021 10/30/2020 3/12/2020 3/9/2020 2/20/2020 12/4/2019 8/19/2019   Little interest or pleasure in doing things Nearly every day Not at all Not at all Not at all Not at all Not at all Not at all   Feeling down, depressed, irritable, or hopeless More than half the days Not at all Not at all Not at all Not at all Not at all Not at all   Total Score PHQ 2 5 0 0 0 0 0 0   Trouble falling or staying asleep, or sleeping too much Nearly every day - - - - - -   Feeling tired or having little energy Several days - - - - - -   Poor appetite, weight loss, or overeating Nearly every day - - - - - -   Feeling bad about yourself - or that you are a failure or have let yourself or your family down More than half the days - - - - - -   Trouble concentrating on things such as school, work, reading, or watching TV Nearly every day - - - - - -   Moving or speaking so slowly that other people could have noticed; or the opposite being so fidgety that others notice Not at all - - - - - -   Thoughts of being better off dead, or hurting yourself in some way Not at all - - - - - -   PHQ 9 Score 17 - - - - - -   How difficult have these problems made it for you to do your work, take care of your home and get along with others Not difficult at all - - - - - -     Recent Travel Screening and Travel History documentation     Travel Screening     No screening recorded since 01/25/22 0000     Travel History   Travel since 12/26/21    No documented travel since 12/26/21

## 2022-01-26 NOTE — PROGRESS NOTES
HISTORY OF PRESENT ILLNESS  Shilpa Francis is a 61 y.o. male. Here for HTN, chronic pain  management. HPI    1) HTN - amlodipine 10 mg reports compliance with regimen. BP Readings from Last 3 Encounters:   01/26/22 125/80   12/07/21 (!) 142/93   10/28/21 (!) 143/98     Lab Results   Component Value Date/Time    Cholesterol, total 125 07/01/2019 09:10 AM    HDL Cholesterol 23 (L) 07/01/2019 09:10 AM    LDL, calculated 88 07/01/2019 09:10 AM    VLDL, calculated 14 07/01/2019 09:10 AM    Triglyceride 71 07/01/2019 09:10 AM    CHOL/HDL Ratio 7.4 (H) 02/04/2019 08:04 AM     2)   Shilpa Francis RTC today to follow up on chronic pain diagnosis. We discussed his arthralgias and radiculopathy that is affecting his back and neck. Significant changes since last visit: none. He is  able to do his normal daily activities. He reports the following adverse side effects: none. Least pain over the last week has been 4/10. Worst pain over the last week has been 10/10. Opioid Risk Tool Reviewed: YES    Aberrant behaviors: None. Urine Drug Screen: reviewed and up to date. Controlled substance agreement on file: YES.  reviewed:yes    Concomitant use of a benzodiazepine: no    Naloxone prescription is warranted. It has been provided or is already on file. 3) Pain which comes and goes left sided of chest - felt some fluttering in his chest this past weekend which went down his left arm like a surge of pain - Last EKG 04/2020 NSR - H/X of his Maternal grandmothers side having heart attacks or heart issues at a young age.      /80 (BP 1 Location: Right arm, BP Patient Position: Sitting)   Pulse 87   Temp 96.8 °F (36 °C) (Temporal)   Resp 18   Ht 6' (1.829 m)   Wt 289 lb 12.8 oz (131.5 kg)   SpO2 97%   BMI 39.30 kg/m²   Current Outpatient Medications   Medication Sig Dispense Refill    acetaminophen-codeine (TYLENOL #3) 300-30 mg per tablet Take 1 Tablet by mouth three (3) times daily as needed for Pain for up to 30 days. Max Daily Amount: 3 Tablets. 90 Tablet 0    amitriptyline (ELAVIL) 25 mg tablet Take 3 Tablets by mouth nightly. 90 Tablet 2    budesonide-formoteroL (Symbicort) 80-4.5 mcg/actuation HFAA Take 2 Puffs by inhalation two (2) times a day. 3 Each 3    amLODIPine (NORVASC) 10 mg tablet Take 1 Tablet by mouth daily. 90 Tablet 0    colchicine (Colcrys) 0.6 mg tablet Take 2 capsules by mouth now for 1 dose. Then take 1 cap by mouth 1 hour later. Repeat for future flares. 30 Tablet 0    tamsulosin (FLOMAX) 0.4 mg capsule TAKE ONE CAPSULE BY MOUTH DAILY AFTER DINNER 90 Cap 3    gabapentin (NEURONTIN) 300 mg capsule Take 1 cap po qam, 1 cap po every afternoon and 2 caps po qhs. 120 Cap 5    albuterol (PROVENTIL HFA, VENTOLIN HFA, PROAIR HFA) 90 mcg/actuation inhaler Take  by inhalation.  naloxone (NARCAN) 4 mg/actuation nasal spray Use 1 spray intranasally, then discard. Repeat with new spray every 2 min as needed for opioid overdose symptoms, alternating nostrils. (Patient not taking: Reported on 8/19/2021) 2 Each 0         Review of Systems   Constitutional: Negative for chills, fever and malaise/fatigue. Eyes: Negative for blurred vision and double vision. Respiratory: Negative for cough, shortness of breath and wheezing. Cardiovascular: Negative for chest pain, palpitations and leg swelling. Neurological: Negative for dizziness and headaches. Physical Exam  Vitals and nursing note reviewed. Constitutional:       General: He is not in acute distress. Appearance: He is obese. He is not ill-appearing. HENT:      Head: Normocephalic. Right Ear: External ear normal.      Left Ear: External ear normal.      Mouth/Throat:      Comments: MASK  Eyes:      General: No scleral icterus. Extraocular Movements: Extraocular movements intact. Conjunctiva/sclera: Conjunctivae normal.      Pupils: Pupils are equal, round, and reactive to light. Cardiovascular:      Rate and Rhythm: Normal rate and regular rhythm. Pulses: Normal pulses. Heart sounds: Normal heart sounds. Pulmonary:      Effort: Pulmonary effort is normal. No respiratory distress. Breath sounds: Normal breath sounds. No wheezing. Musculoskeletal:         General: Normal range of motion. Cervical back: Normal range of motion. Right lower leg: Edema present. Left lower leg: Edema present. Lymphadenopathy:      Cervical: No cervical adenopathy. Skin:     General: Skin is warm and dry. Findings: No lesion or rash. Neurological:      General: No focal deficit present. Mental Status: He is alert and oriented to person, place, and time. Mental status is at baseline. Psychiatric:         Mood and Affect: Mood normal.         Behavior: Behavior normal.         Thought Content: Thought content normal.         Judgment: Judgment normal.       ASSESSMENT and PLAN  Diagnoses and all orders for this visit:    1. Essential hypertension  -     LIPID PANEL; Future  -     METABOLIC PANEL, COMPREHENSIVE; Future    2. Cervical radiculopathy  -     acetaminophen-codeine (TYLENOL #3) 300-30 mg per tablet; Take 1 Tablet by mouth three (3) times daily as needed for Pain for up to 30 days. Max Daily Amount: 3 Tablets. 3. Lumbar spine pain  -     acetaminophen-codeine (TYLENOL #3) 300-30 mg per tablet; Take 1 Tablet by mouth three (3) times daily as needed for Pain for up to 30 days. Max Daily Amount: 3 Tablets. 4. Chronic primary musculoskeletal pain    5. Severe obesity (BMI 35.0-35.9 with comorbidity) (HCC)    6. Chest discomfort  -     REFERRAL TO CARDIOLOGY  -     EKG, 12 LEAD, INITIAL; Future    7.  Screening for colon cancer  -     REFERRAL TO GASTROENTEROLOGY      HTN - controlled continue amlodipine 10 mg   Update lipid labs   Chronic pain -  reviewed, UDS UTD, pain contract on file   Chest discomfort - EKG for evaluation and referred to cardiology for stress test. H/X of young family members with cardiovascular disease prior to 48 and recent episode of flutter feeling and pain shooting down left arm   Referred for colonoscopy      Follow-up and Dispositions    · Return in about 3 months (around 4/26/2022) for chronic pain and HTN.

## 2022-01-27 ENCOUNTER — DOCUMENTATION ONLY (OUTPATIENT)
Dept: ORTHOPEDIC SURGERY | Age: 57
End: 2022-01-27

## 2022-01-27 DIAGNOSIS — E87.8 BLOOD CO2 DECREASED: Primary | ICD-10-CM

## 2022-01-27 LAB
ALBUMIN SERPL-MCNC: 4.4 G/DL (ref 3.8–4.9)
ALBUMIN/GLOB SERPL: 1.6 {RATIO} (ref 1.2–2.2)
ALP SERPL-CCNC: 104 IU/L (ref 44–121)
ALT SERPL-CCNC: 28 IU/L (ref 0–44)
AST SERPL-CCNC: 26 IU/L (ref 0–40)
ATRIAL RATE: 93 BPM
BILIRUB SERPL-MCNC: 0.3 MG/DL (ref 0–1.2)
BUN SERPL-MCNC: 15 MG/DL (ref 6–24)
BUN/CREAT SERPL: 14 (ref 9–20)
CALCIUM SERPL-MCNC: 9.1 MG/DL (ref 8.7–10.2)
CALCULATED P AXIS, ECG09: 65 DEGREES
CALCULATED R AXIS, ECG10: 6 DEGREES
CALCULATED T AXIS, ECG11: 59 DEGREES
CHLORIDE SERPL-SCNC: 103 MMOL/L (ref 96–106)
CHOLEST SERPL-MCNC: 196 MG/DL (ref 100–199)
CO2 SERPL-SCNC: 16 MMOL/L (ref 20–29)
CREAT SERPL-MCNC: 1.08 MG/DL (ref 0.76–1.27)
DIAGNOSIS, 93000: NORMAL
GLOBULIN SER CALC-MCNC: 2.8 G/DL (ref 1.5–4.5)
GLUCOSE SERPL-MCNC: 99 MG/DL (ref 65–99)
HDLC SERPL-MCNC: 28 MG/DL
IMP & REVIEW OF LAB RESULTS: NORMAL
LDLC SERPL CALC-MCNC: 141 MG/DL (ref 0–99)
P-R INTERVAL, ECG05: 174 MS
POTASSIUM SERPL-SCNC: 4.8 MMOL/L (ref 3.5–5.2)
PROT SERPL-MCNC: 7.2 G/DL (ref 6–8.5)
Q-T INTERVAL, ECG07: 342 MS
QRS DURATION, ECG06: 74 MS
QTC CALCULATION (BEZET), ECG08: 425 MS
SODIUM SERPL-SCNC: 142 MMOL/L (ref 134–144)
TRIGL SERPL-MCNC: 146 MG/DL (ref 0–149)
VENTRICULAR RATE, ECG03: 93 BPM
VLDLC SERPL CALC-MCNC: 27 MG/DL (ref 5–40)

## 2022-01-27 NOTE — PROGRESS NOTES
Results reviewed. Please call patient with results. Cholesterol is elevated his bad cholesterol is and his good cholesterol is low - based on his ASCVD risks he is at intermediate risk of a cardiovascular event (heart attack or stroke) in the next 10 years his risk is 11.2% and should be 4.3 his lifetime risk is 50 % - would he be willing to start a statin as recommended? I would like to repeat his C02 please advise him of lab hours to return.

## 2022-01-27 NOTE — PROGRESS NOTES
Results reviewed. Please call patient with results. EKG indicated borderline - but no significant change was noted from previous EKG one year ago - I think it would be a good idea for him to F/U with evaluation with cardiology.

## 2022-01-27 NOTE — PROGRESS NOTES
Pt called inquiring about w/c auth for his EMG. Pt informed no Blake Okeefe has been rec'vd. Pt informed we do not have the needed information to reach out to his new c/m Brandi Sanchez (per Laurence Mcelroy)  Per Tommy Ramsey his Blake Okeefe was sent to the w/c home office on 1/10/2022. Pt asked to provide the needed info   (Birdie's p#) to move forward.

## 2022-01-28 NOTE — PROGRESS NOTES
Spoke with patient and he stated he did review lab results on my chart. He stated he is willing to try statin    I also gave him lab hours.

## 2022-01-31 DIAGNOSIS — E78.5 HYPERLIPIDEMIA, UNSPECIFIED HYPERLIPIDEMIA TYPE: Primary | ICD-10-CM

## 2022-01-31 RX ORDER — ATORVASTATIN CALCIUM 20 MG/1
20 TABLET, FILM COATED ORAL
Qty: 90 TABLET | Refills: 1 | Status: SHIPPED | OUTPATIENT
Start: 2022-01-31 | End: 2022-08-29

## 2022-03-08 ENCOUNTER — TELEPHONE (OUTPATIENT)
Dept: ORTHOPEDIC SURGERY | Age: 57
End: 2022-03-08

## 2022-03-08 ENCOUNTER — OFFICE VISIT (OUTPATIENT)
Dept: CARDIOLOGY CLINIC | Age: 57
End: 2022-03-08
Payer: MEDICARE

## 2022-03-08 VITALS
HEART RATE: 85 BPM | TEMPERATURE: 97.5 F | SYSTOLIC BLOOD PRESSURE: 137 MMHG | HEIGHT: 73 IN | RESPIRATION RATE: 18 BRPM | WEIGHT: 295.4 LBS | DIASTOLIC BLOOD PRESSURE: 88 MMHG | OXYGEN SATURATION: 97 % | BODY MASS INDEX: 39.15 KG/M2

## 2022-03-08 DIAGNOSIS — R07.89 CHEST DISCOMFORT: Primary | ICD-10-CM

## 2022-03-08 DIAGNOSIS — M54.12 CERVICAL RADICULOPATHY: ICD-10-CM

## 2022-03-08 DIAGNOSIS — G47.30 SLEEP APNEA, UNSPECIFIED TYPE: ICD-10-CM

## 2022-03-08 PROCEDURE — G8432 DEP SCR NOT DOC, RNG: HCPCS | Performed by: INTERNAL MEDICINE

## 2022-03-08 PROCEDURE — 3017F COLORECTAL CA SCREEN DOC REV: CPT | Performed by: INTERNAL MEDICINE

## 2022-03-08 PROCEDURE — G8417 CALC BMI ABV UP PARAM F/U: HCPCS | Performed by: INTERNAL MEDICINE

## 2022-03-08 PROCEDURE — G8428 CUR MEDS NOT DOCUMENT: HCPCS | Performed by: INTERNAL MEDICINE

## 2022-03-08 PROCEDURE — 99204 OFFICE O/P NEW MOD 45 MIN: CPT | Performed by: INTERNAL MEDICINE

## 2022-03-08 PROCEDURE — G8754 DIAS BP LESS 90: HCPCS | Performed by: INTERNAL MEDICINE

## 2022-03-08 PROCEDURE — G8752 SYS BP LESS 140: HCPCS | Performed by: INTERNAL MEDICINE

## 2022-03-08 RX ORDER — CHLORTHALIDONE 25 MG/1
25 TABLET ORAL DAILY
Qty: 90 TABLET | Refills: 3 | Status: SHIPPED | OUTPATIENT
Start: 2022-03-08

## 2022-03-08 RX ORDER — NITROGLYCERIN 0.4 MG/1
0.4 TABLET SUBLINGUAL
Qty: 25 TABLET | Refills: 5 | Status: SHIPPED | OUTPATIENT
Start: 2022-03-08

## 2022-03-08 RX ORDER — GABAPENTIN 300 MG/1
CAPSULE ORAL
Qty: 120 CAPSULE | Refills: 0 | Status: SHIPPED | OUTPATIENT
Start: 2022-03-13 | End: 2022-04-11 | Stop reason: SDUPTHER

## 2022-03-08 RX ORDER — ASPIRIN 81 MG/1
81 TABLET ORAL DAILY
Qty: 90 TABLET | Refills: 3 | Status: SHIPPED | OUTPATIENT
Start: 2022-03-08

## 2022-03-08 NOTE — PROGRESS NOTES
Identified pt with two pt identifiers(name and ). Reviewed record in preparation for visit and have obtained necessary documentation. Emilia Alexandr presents today for   Chief Complaint   Patient presents with    New Patient     Chest Discomfort     Fatigue    Dizziness    Swelling     Ankles       Emilia Strange preferred language for health care discussion is english/other. Is someone accompanying this pt? No    Is the patient using any DME equipment during OV?  No     Depression Screening:  3 most recent PHQ Screens 2021 10/30/2020 3/12/2020 3/9/2020 2020 2019 2019   Little interest or pleasure in doing things Nearly every day Not at all Not at all Not at all Not at all Not at all Not at all   Feeling down, depressed, irritable, or hopeless More than half the days Not at all Not at all Not at all Not at all Not at all Not at all   Total Score PHQ 2 5 0 0 0 0 0 0   Trouble falling or staying asleep, or sleeping too much Nearly every day - - - - - -   Feeling tired or having little energy Several days - - - - - -   Poor appetite, weight loss, or overeating Nearly every day - - - - - -   Feeling bad about yourself - or that you are a failure or have let yourself or your family down More than half the days - - - - - -   Trouble concentrating on things such as school, work, reading, or watching TV Nearly every day - - - - - -   Moving or speaking so slowly that other people could have noticed; or the opposite being so fidgety that others notice Not at all - - - - - -   Thoughts of being better off dead, or hurting yourself in some way Not at all - - - - - -   PHQ 9 Score 17 - - - - - -   How difficult have these problems made it for you to do your work, take care of your home and get along with others Not difficult at all - - - - - -       Learning Assessment:  Learning Assessment 2019   PRIMARY LEARNER Patient Patient   HIGHEST LEVEL OF EDUCATION - PRIMARY LEARNER  > 4 YEARS OF COLLEGE > 4 YEARS OF COLLEGE   BARRIERS PRIMARY LEARNER NONE NONE   CO-LEARNER CAREGIVER No No   PRIMARY LANGUAGE ENGLISH ENGLISH   LEARNER PREFERENCE PRIMARY DEMONSTRATION DEMONSTRATION   ANSWERED BY Patient patient   RELATIONSHIP SELF SELF       Abuse Screening:  No flowsheet data found. Fall Risk  No flowsheet data found. Coordination of Care:  1. Have you been to the ER, urgent care clinic since your last visit? Hospitalized since your last visit? No    2. Have you seen or consulted any other health care providers outside of the [address] [place] [address] since your last visit? Include any pap smears or colon screening. No      Please see Red banners under Allergies and Med Rec to remove outside inquires. All correct information has been verified with patient and added to chart.       Medication's patient's would liked removed has been marked not taking to be removed per Verbal order and read back per Mireya Capellan MD

## 2022-03-08 NOTE — PATIENT INSTRUCTIONS
Testing   Echo  Nuclear Stress at [facility]    Nuclear Stress Instructions-Nothing to eat or drink past midnight and no medicaitons the morning of cardiac testing. **call office 3-5 days after testing is completed for results**     New Medication/Medication Changes   Start Aspirin 81 mg daily  Nitro 0.4 mg as needed for chest pain  chlorthalidone 25 mg tabdaily   **please allow 24-48 hrs for medication to be escribed to pharmacy** If you need any refills on medications please contact your pharmacy so that the request can be escribed to the provider for review.       Other  Referral to Sleep Study- they will contact you for date and time for appointment

## 2022-03-08 NOTE — TELEPHONE ENCOUNTER
Angelique Wiggins gave the gabapentin March 2021. Dr Suzie Cope has been giving him the gabapentin now. I will send in one refill with dnf 3/13/22. Have the pt make a fu with Dr Suzie Cope.

## 2022-03-08 NOTE — TELEPHONE ENCOUNTER
Patient called in stating \"he was told by Priscilla Yeboah to call our office and make Dr. Seamus Granda send a refill of the medication gabapentin (NEURONTIN) 300 mg capsule since he's under his care\"     Patient's contact is [phone]
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
## 2022-03-08 NOTE — LETTER
3/8/2022    Patient: Rakan Sanches   YOB: 1965   Date of Visit: 3/8/2022     Meghann Escoto NP  [address] [id number]  [address] [facility] [address]    Dear Meghann Escoto NP,      Thank you for referring Mr. Luzmaria Ty to [address] [place] [address] for evaluation. My notes for this consultation are attached. If you have questions, please do not hesitate to call me. I look forward to following your patient along with you.       Sincerely,    Emir Rose MD

## 2022-03-14 ENCOUNTER — TELEPHONE (OUTPATIENT)
Dept: CARDIOLOGY CLINIC | Age: 57
End: 2022-03-14

## 2022-03-25 ENCOUNTER — TELEPHONE (OUTPATIENT)
Dept: CARDIOLOGY CLINIC | Age: 57
End: 2022-03-25

## 2022-03-28 ENCOUNTER — HOSPITAL ENCOUNTER (OUTPATIENT)
Dept: LAB | Age: 57
Discharge: HOME | End: 2022-03-28
Payer: MEDICARE

## 2022-03-28 ENCOUNTER — OFFICE VISIT (OUTPATIENT)
Dept: CARDIOLOGY CLINIC | Age: 57
End: 2022-03-28

## 2022-03-28 VITALS
HEART RATE: 94 BPM | WEIGHT: 297 LBS | OXYGEN SATURATION: 99 % | HEIGHT: 73 IN | DIASTOLIC BLOOD PRESSURE: 84 MMHG | BODY MASS INDEX: 39.36 KG/M2 | SYSTOLIC BLOOD PRESSURE: 138 MMHG

## 2022-03-28 DIAGNOSIS — R07.9 CHEST PAIN, UNSPECIFIED TYPE: ICD-10-CM

## 2022-03-28 DIAGNOSIS — R94.39 ABNORMAL NUCLEAR STRESS TEST: ICD-10-CM

## 2022-03-28 DIAGNOSIS — R94.39 ABNORMAL NUCLEAR STRESS TEST: Primary | ICD-10-CM

## 2022-03-28 LAB
ALBUMIN SERPL-MCNC: 3.9 G/DL (ref 3.4–5)
ALBUMIN/GLOB SERPL: 1.1 {RATIO} (ref 0.8–1.7)
ALP SERPL-CCNC: 95 U/L (ref 45–117)
ALT SERPL-CCNC: 52 U/L (ref 16–61)
ANION GAP SERPL CALC-SCNC: 5 MMOL/L (ref 3–18)
AST SERPL-CCNC: 35 U/L (ref 10–38)
BASOPHILS # BLD: 0.1 K/UL (ref 0–0.1)
BASOPHILS NFR BLD: 1 % (ref 0–2)
BILIRUB SERPL-MCNC: 0.5 MG/DL (ref 0.2–1)
BUN SERPL-MCNC: 19 MG/DL (ref 7–18)
BUN/CREAT SERPL: 14 (ref 12–20)
CALCIUM SERPL-MCNC: 9.3 MG/DL (ref 8.5–10.1)
CHLORIDE SERPL-SCNC: 102 MMOL/L (ref 100–111)
CO2 SERPL-SCNC: 30 MMOL/L (ref 21–32)
CREAT SERPL-MCNC: 1.34 MG/DL (ref 0.6–1.3)
DIFFERENTIAL METHOD BLD: ABNORMAL
EOSINOPHIL # BLD: 0.1 K/UL (ref 0–0.4)
EOSINOPHIL NFR BLD: 1 % (ref 0–5)
ERYTHROCYTE [DISTWIDTH] IN BLOOD BY AUTOMATED COUNT: 12.8 % (ref 11.6–14.5)
GLOBULIN SER CALC-MCNC: 3.4 G/DL (ref 2–4)
GLUCOSE SERPL-MCNC: 101 MG/DL (ref 74–99)
HCT VFR BLD AUTO: 45.4 % (ref 36–48)
HGB BLD-MCNC: 15.6 G/DL (ref 13–16)
IMM GRANULOCYTES # BLD AUTO: 0.1 K/UL (ref 0–0.04)
IMM GRANULOCYTES NFR BLD AUTO: 1 % (ref 0–0.5)
INR PPP: 0.9 (ref 0.8–1.2)
LYMPHOCYTES # BLD: 2.2 K/UL (ref 0.9–3.6)
LYMPHOCYTES NFR BLD: 28 % (ref 21–52)
MCH RBC QN AUTO: 32.7 PG (ref 24–34)
MCHC RBC AUTO-ENTMCNC: 34.4 G/DL (ref 31–37)
MCV RBC AUTO: 95.2 FL (ref 78–100)
MONOCYTES # BLD: 0.7 K/UL (ref 0.05–1.2)
MONOCYTES NFR BLD: 10 % (ref 3–10)
NEUTS SEG # BLD: 4.5 K/UL (ref 1.8–8)
NEUTS SEG NFR BLD: 59 % (ref 40–73)
NRBC # BLD: 0 K/UL (ref 0–0.01)
NRBC BLD-RTO: 0 PER 100 WBC
PLATELET # BLD AUTO: 265 K/UL (ref 135–420)
PMV BLD AUTO: 10 FL (ref 9.2–11.8)
POTASSIUM SERPL-SCNC: 3.8 MMOL/L (ref 3.5–5.5)
PROT SERPL-MCNC: 7.3 G/DL (ref 6.4–8.2)
PROTHROMBIN TIME: 12.3 SEC (ref 11.5–15.2)
RBC # BLD AUTO: 4.77 M/UL (ref 4.35–5.65)
SODIUM SERPL-SCNC: 137 MMOL/L (ref 136–145)
WBC # BLD AUTO: 7.7 K/UL (ref 4.6–13.2)

## 2022-03-28 PROCEDURE — G8428 CUR MEDS NOT DOCUMENT: HCPCS | Performed by: INTERNAL MEDICINE

## 2022-03-28 PROCEDURE — G8417 CALC BMI ABV UP PARAM F/U: HCPCS | Performed by: INTERNAL MEDICINE

## 2022-03-28 PROCEDURE — G8752 SYS BP LESS 140: HCPCS | Performed by: INTERNAL MEDICINE

## 2022-03-28 PROCEDURE — G8432 DEP SCR NOT DOC, RNG: HCPCS | Performed by: INTERNAL MEDICINE

## 2022-03-28 PROCEDURE — 99215 OFFICE O/P EST HI 40 MIN: CPT | Performed by: INTERNAL MEDICINE

## 2022-03-28 PROCEDURE — 85610 PROTHROMBIN TIME: CPT

## 2022-03-28 PROCEDURE — 3017F COLORECTAL CA SCREEN DOC REV: CPT | Performed by: INTERNAL MEDICINE

## 2022-03-28 PROCEDURE — 85025 COMPLETE CBC W/AUTO DIFF WBC: CPT

## 2022-03-28 PROCEDURE — 80053 COMPREHEN METABOLIC PANEL: CPT

## 2022-03-28 PROCEDURE — G8754 DIAS BP LESS 90: HCPCS | Performed by: INTERNAL MEDICINE

## 2022-03-28 PROCEDURE — 36415 COLL VENOUS BLD VENIPUNCTURE: CPT

## 2022-03-28 RX ORDER — SODIUM CHLORIDE 9 MG/ML
1000 INJECTION, SOLUTION INTRAVENOUS CONTINUOUS
Status: CANCELLED | OUTPATIENT
Start: 2022-03-28

## 2022-03-28 RX ORDER — SODIUM CHLORIDE 0.9 % (FLUSH) 0.9 %
5-40 SYRINGE (ML) INJECTION EVERY 8 HOURS
Status: CANCELLED | OUTPATIENT
Start: 2022-03-28

## 2022-03-28 RX ORDER — SODIUM CHLORIDE 0.9 % (FLUSH) 0.9 %
5-40 SYRINGE (ML) INJECTION AS NEEDED
Status: CANCELLED | OUTPATIENT
Start: 2022-03-28

## 2022-03-28 NOTE — LETTER
3/28/2022 11:43 AM  Karol Rocha Jr  [id number]  1965        Insurance:  Mej[employer] Gene # _____________________      Proc Date: Wed 4/6                Proc Time:  11:45      Performing MD : Dr. Lilia Hendricks                                         Scheduled with:  Srini Tate                                               Date:3/28/2022          HP: 3/28    EKG: ______    Labs:______  CXR: _______  Orders:  3/28          Special Instructions:  _____________________________________________________  ______________________________________________________________________  ______________________________________________________________________          The materials enclosed with this facsimile transmission are private and confidential and are the property of the sender. If you are not the intended recipient, be advised that any unauthorized use, disclosure, copying, distribution, or the taking of any action in reliance on the contents of this telecopied information is strictly prohibited. If you have received this in error, please immediately notify the sender via telephone to arrange for return of the forwarded documentation.
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
## 2022-03-28 NOTE — Clinical Note
3/28/2022    Patient: Kalyani Turk   YOB: 1965   Date of Visit: 3/28/2022     Ras Singh NP  [place] [id number]  [facility], [address] [place] [address]  Via     Dear STEPHEN Mckeon RN,      Thank you for referring Mr. Frances Reynoso to [address] [place] [address] for evaluation. My notes for this consultation are attached. If you have questions, please do not hesitate to call me. I look forward to following your patient along with you.       Sincerely,    Keri Billings MD

## 2022-03-28 NOTE — PROGRESS NOTES
Alissa Carroll presents today for   Chief Complaint   Patient presents with    Follow-up     after nuc     Chest Pain (Angina)     intermittent pain, middle to left with left arm pain     Dizziness     sometimes        Alissa Carroll preferred language for health care discussion is english/other. Is someone accompanying this pt? no    Is the patient using any DME equipment during [address]? no    Depression Screening:  3 most recent PHQ Screens 9/13/2021   Little interest or pleasure in doing things Nearly every day   Feeling down, depressed, irritable, or hopeless More than half the days   Total Score PHQ 2 5   Trouble falling or staying asleep, or sleeping too much Nearly every day   Feeling tired or having little energy Several days   Poor appetite, weight loss, or overeating Nearly every day   Feeling bad about yourself - or that you are a failure or have let yourself or your family down More than half the days   Trouble concentrating on things such as school, work, reading, or watching TV Nearly every day   Moving or speaking so slowly that other people could have noticed; or the opposite being so fidgety that others notice Not at all   Thoughts of being better off dead, or hurting yourself in some way Not at all   PHQ 9 Score 17   How difficult have these problems made it for you to do your work, take care of your home and get along with others Not difficult at all       Learning Assessment:  Learning Assessment 8/9/2019   PRIMARY LEARNER Patient   HIGHEST LEVEL OF EDUCATION - PRIMARY LEARNER  > 4 YEARS [address] CAREGIVER No   PRIMARY LANGUAGE ENGLISH   LEARNER PREFERENCE PRIMARY DEMONSTRATION   ANSWERED BY Patient   RELATIONSHIP SELF     Pt currently taking Anticoagulant therapy? ASA 81mg every day     Coordination of Care:  1. Have you been to the ER, urgent care clinic since your last visit? Hospitalized since your last visit? no    2.  Have you seen or consulted any other health care providers outside of the [address] [place] [address] since your last visit? Include any pap smears or colon screening.  no

## 2022-03-28 NOTE — PATIENT INSTRUCTIONS
Instructions    Patients Name:  Johnson Krabbe    1. You are scheduled to have a Cardiac Catheterization on April 6, 2022  at 11:45 a.m. Please check in at 10:15 a.m.  . 2. Please go to [facility] and [facility] in the outpatient parking lot that is located around to the back of the hospital and enter through the [address] [place] [address] building. Once you enter through the [address] check in with the  there. The  will either give you directions or assist you in getting to the cath holding area. 3. You are not to eat or drink anything after midnight the night before your procedure. Small sips of water to take your medications is ok. 4. If you are diabetic, do not take your insulin/sugar pill the morning of the procedure. 5. MEDICATION INSTRUCTIONS:   Please take your morning medications with the following special instructions:    [x]          Please make sure to take your Blood pressure medication : Norvasc, [place]. [x]          Take your Aspirin  . 6. We encourage families to wait in the waiting room on the first floor while the procedure is being done. The Doctor will come out and talk with you as soon as the procedure is over. 7. There is the possibility that you may spend the night in the hospital, depending on the results of the procedure. This will be determined after the procedure is done. If angioplasty or stent is planned, you will stay at least one day. 8. If you or your family have any questions, please call our office Monday Friday, 9:00 a. m.4:30 p.m.,  At [phone], and ask to speak to one of the nurses.

## 2022-04-06 ENCOUNTER — HOSPITAL ENCOUNTER (OUTPATIENT)
Age: 57
Setting detail: OUTPATIENT SURGERY
Discharge: HOME | End: 2022-04-06
Attending: INTERNAL MEDICINE | Admitting: INTERNAL MEDICINE
Payer: MEDICARE

## 2022-04-06 VITALS
HEIGHT: 72 IN | SYSTOLIC BLOOD PRESSURE: 140 MMHG | WEIGHT: 295 LBS | OXYGEN SATURATION: 96 % | DIASTOLIC BLOOD PRESSURE: 88 MMHG | HEART RATE: 96 BPM | BODY MASS INDEX: 39.96 KG/M2

## 2022-04-06 DIAGNOSIS — I20.8 OTHER FORMS OF ANGINA PECTORIS (HCC): ICD-10-CM

## 2022-04-06 DIAGNOSIS — R93.1 ABNORMAL NUCLEAR CARDIAC IMAGING TEST: ICD-10-CM

## 2022-04-06 PROCEDURE — 99152 MOD SED SAME PHYS/QHP 5/>YRS: CPT | Performed by: INTERNAL MEDICINE

## 2022-04-06 PROCEDURE — 77030013797 HC KT TRNSDUC PRSSR EDWD -A: Performed by: INTERNAL MEDICINE

## 2022-04-06 PROCEDURE — 99153 MOD SED SAME PHYS/QHP EA: CPT | Performed by: INTERNAL MEDICINE

## 2022-04-06 PROCEDURE — C1894 INTRO/SHEATH, NON-LASER: HCPCS | Performed by: INTERNAL MEDICINE

## 2022-04-06 PROCEDURE — 74011000636 HC RX REV CODE- 636: Performed by: INTERNAL MEDICINE

## 2022-04-06 PROCEDURE — 93458 L HRT ARTERY/VENTRICLE ANGIO: CPT | Performed by: INTERNAL MEDICINE

## 2022-04-06 PROCEDURE — 77030029997 HC DEV COM RDL R BND TELE -B: Performed by: INTERNAL MEDICINE

## 2022-04-06 PROCEDURE — C1769 GUIDE WIRE: HCPCS | Performed by: INTERNAL MEDICINE

## 2022-04-06 PROCEDURE — 77030012597: Performed by: INTERNAL MEDICINE

## 2022-04-06 PROCEDURE — 74011000250 HC RX REV CODE- 250: Performed by: INTERNAL MEDICINE

## 2022-04-06 PROCEDURE — 77030015766: Performed by: INTERNAL MEDICINE

## 2022-04-06 PROCEDURE — 77030013761 HC KT HRT LFT ANGI -B: Performed by: INTERNAL MEDICINE

## 2022-04-06 PROCEDURE — 74011250636 HC RX REV CODE- 250/636: Performed by: INTERNAL MEDICINE

## 2022-04-06 RX ORDER — HEPARIN SODIUM 200 [USP'U]/100ML
INJECTION, SOLUTION INTRAVENOUS AS NEEDED
Status: DISCONTINUED | OUTPATIENT
Start: 2022-04-06 | End: 2022-04-06 | Stop reason: HOSPADM

## 2022-04-06 RX ORDER — FENTANYL CITRATE 50 UG/ML
INJECTION, SOLUTION INTRAMUSCULAR; INTRAVENOUS AS NEEDED
Status: DISCONTINUED | OUTPATIENT
Start: 2022-04-06 | End: 2022-04-06 | Stop reason: HOSPADM

## 2022-04-06 RX ORDER — MIDAZOLAM HYDROCHLORIDE 1 MG/ML
INJECTION, SOLUTION INTRAMUSCULAR; INTRAVENOUS AS NEEDED
Status: DISCONTINUED | OUTPATIENT
Start: 2022-04-06 | End: 2022-04-06 | Stop reason: HOSPADM

## 2022-04-06 RX ORDER — HEPARIN SODIUM 1000 [USP'U]/ML
INJECTION, SOLUTION INTRAVENOUS; SUBCUTANEOUS AS NEEDED
Status: DISCONTINUED | OUTPATIENT
Start: 2022-04-06 | End: 2022-04-06 | Stop reason: HOSPADM

## 2022-04-06 RX ORDER — VERAPAMIL HYDROCHLORIDE 2.5 MG/ML
INJECTION, SOLUTION INTRAVENOUS AS NEEDED
Status: DISCONTINUED | OUTPATIENT
Start: 2022-04-06 | End: 2022-04-06 | Stop reason: HOSPADM

## 2022-04-06 RX ORDER — LIDOCAINE HYDROCHLORIDE 10 MG/ML
INJECTION INFILTRATION; PERINEURAL AS NEEDED
Status: DISCONTINUED | OUTPATIENT
Start: 2022-04-06 | End: 2022-04-06 | Stop reason: HOSPADM

## 2022-04-06 NOTE — DISCHARGE INSTRUCTIONS
HEART CATHETERIZATION/ANGIOGRAPHY DISCHARGE INSTRUCTIONS    DISCHARGE SUMMARY from Nurse    PATIENT INSTRUCTIONS:    After general anesthesia or intravenous sedation, for 24 hours or while taking prescription Narcotics:  · Limit your activities  · Do not drive and operate hazardous machinery  · Do not make important personal or business decisions  · Do  not drink alcoholic beverages  · If you have not urinated within 8 hours after discharge, please contact your surgeon on call. Report the following to your surgeon:  · Excessive pain, swelling, redness or odor of or around the surgical area  · Temperature over 100.5  · Nausea and vomiting lasting longer than 4 hours or if unable to take medications  · Any signs of decreased circulation or nerve impairment to extremity: change in color, persistent  numbness, tingling, coldness or increase pain  · Any questions    What to do at Home:  Recommended activity: Activity as tolerated and no driving for today,     These are general instructions for a healthy lifestyle:    No smoking/ No tobacco products/ Avoid exposure to second hand smoke  Surgeon General's Warning:  Quitting smoking now greatly reduces serious risk to your health. Obesity, smoking, and sedentary lifestyle greatly increases your risk for illness    A healthy diet, regular physical exercise & weight monitoring are important for maintaining a healthy lifestyle    You may be retaining fluid if you have a history of heart failure or if you experience any of the following symptoms:  Weight gain of 3 pounds or more overnight or 5 pounds in a week, increased swelling in our hands or feet or shortness of breath while lying flat in bed. Please call your doctor as soon as you notice any of these symptoms; do not wait until your next office visit. The discharge information has been reviewed with the patient. The patient verbalized understanding.   Discharge medications reviewed with the patient and appropriate educational materials and side effects teaching were provided. ___________________________________________________________________________________________________________________________________  1. Check puncture site frequently for swelling or bleeding. If there is any bleeding, lie down and apply pressure over the area with a clean towel or washcloth. Notify your doctor for any redness, swelling, drainage, or oozing from the puncture site. Notify your doctor for any fever or chills. 2. If the extremity becomes cold, numb, or painful call Dr. Meagan High  3. Activity should be limited for the next 48 hours. Climb stairs as little as possible and avoid any stooping, bending, or strenuous activity for 48 hours. No heavy lifting (anything over 10 pounds) for 3 days. 4. You may resume your usual diet. Drink more fluids than usual.  5. Have a responsible person drive you home and stay with you for at least 24 hours after your heart catheterization/angiography. 6. You may remove bandage from your Right and Arm in 24 hours. You may shower in 24 hours. No tub baths, hot tubs, or swimming for 1 week. Do not place any lotions, creams, powders, or ointments over puncture site for 1 week. You may place a clean band-aid over the puncture site each day for 5 days. Change daily. I have read the above instructions and have had the opportunity to ask questions.       Patient: ________________________   Date: 4/6/2022    Witness: _______________________   Date: 4/6/2022

## 2022-04-06 NOTE — Clinical Note
TRANSFER - OUT REPORT:     Verbal report given to: Carolina. Report consisted of patient's Situation, Background, Assessment and   Recommendations(SBAR). Opportunity for questions and clarification was provided. Patient transported with a Cardiac Cath Tech / Patient Care Tech. Patient transported to: holding area.

## 2022-04-06 NOTE — Clinical Note
TRANSFER - IN REPORT:     Verbal report received from: Carolina. Report consisted of patient's Situation, Background, Assessment and   Recommendations(SBAR). Opportunity for questions and clarification was provided. Assessment completed upon patient's arrival to unit and care assumed. Patient transported with a Cardiac Cath Tech / Patient Care Tech.

## 2022-04-06 NOTE — Clinical Note
Contrast Dose Calculator:   Patient's age: 61.   Patient's sex: Male. Patient weight (kg) = 133.8. Creatinine level (mg/dL) = 1.34. Creatinine clearance (mL/min): 115. Contrast concentration (mg/mL) = 300. MACD = 300 mL. Max Contrast dose per Creatinine Cl calculator = 258.75 mL.

## 2022-04-06 NOTE — PROGRESS NOTES
1430 TR band removed by [facility] RN , no bleeding or hematoma formation noted. Quick clot, Tegaderm co band dressing applied.

## 2022-04-11 DIAGNOSIS — M54.12 CERVICAL RADICULOPATHY: ICD-10-CM

## 2022-04-11 RX ORDER — GABAPENTIN 300 MG/1
CAPSULE ORAL
Qty: 120 CAPSULE | Refills: 0 | Status: SHIPPED | OUTPATIENT
Start: 2022-04-11 | End: 2022-04-19 | Stop reason: SDUPTHER

## 2022-04-11 NOTE — TELEPHONE ENCOUNTER
Last Visit: 12/7/21 with MD Mai Ruiz  Next Appointment: 4/19/22 with MD Mai Ruiz  Previous Refill Encounter(s): 3/13/22 #120    Requested Prescriptions     Pending Prescriptions Disp Refills    gabapentin (NEURONTIN) 300 mg capsule 120 Capsule 0     Sig: Take 1 cap po qam, 1 cap po every afternoon and 2 caps po qhs.

## 2022-04-19 ENCOUNTER — VIRTUAL VISIT (OUTPATIENT)
Dept: ORTHOPEDIC SURGERY | Age: 57
End: 2022-04-19
Payer: OTHER MISCELLANEOUS

## 2022-04-19 DIAGNOSIS — G56.23 CUBITAL TUNNEL SYNDROME OF BOTH UPPER EXTREMITIES: ICD-10-CM

## 2022-04-19 DIAGNOSIS — G57.11 MERALGIA PARAESTHETICA, RIGHT: ICD-10-CM

## 2022-04-19 DIAGNOSIS — M54.2 NECK PAIN: ICD-10-CM

## 2022-04-19 DIAGNOSIS — G89.29 CHRONIC PRIMARY MUSCULOSKELETAL PAIN: Primary | ICD-10-CM

## 2022-04-19 DIAGNOSIS — M54.12 CERVICAL RADICULOPATHY: ICD-10-CM

## 2022-04-19 DIAGNOSIS — R20.0 NUMBNESS AND TINGLING IN BOTH HANDS: ICD-10-CM

## 2022-04-19 DIAGNOSIS — R20.2 NUMBNESS AND TINGLING IN BOTH HANDS: ICD-10-CM

## 2022-04-19 DIAGNOSIS — M54.50 LUMBAR SPINE PAIN: ICD-10-CM

## 2022-04-19 DIAGNOSIS — M79.18 MYOFASCIAL PAIN: ICD-10-CM

## 2022-04-19 DIAGNOSIS — M79.18 CHRONIC PRIMARY MUSCULOSKELETAL PAIN: Primary | ICD-10-CM

## 2022-04-19 PROCEDURE — 99213 OFFICE O/P EST LOW 20 MIN: CPT | Performed by: PHYSICAL MEDICINE & REHABILITATION

## 2022-04-19 RX ORDER — GABAPENTIN 300 MG/1
CAPSULE ORAL
Qty: 120 CAPSULE | Refills: 5 | Status: SHIPPED | OUTPATIENT
Start: 2022-04-19 | End: 2022-05-20 | Stop reason: SDUPTHER
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
## 2022-04-27 ENCOUNTER — OFFICE VISIT (OUTPATIENT)
Dept: INTERNAL MEDICINE CLINIC | Age: 57
End: 2022-04-27
Payer: MEDICARE

## 2022-04-27 VITALS
OXYGEN SATURATION: 98 % | TEMPERATURE: 97 F | WEIGHT: 301.6 LBS | BODY MASS INDEX: 40.85 KG/M2 | RESPIRATION RATE: 18 BRPM | SYSTOLIC BLOOD PRESSURE: 128 MMHG | HEIGHT: 72 IN | HEART RATE: 88 BPM | DIASTOLIC BLOOD PRESSURE: 91 MMHG

## 2022-04-27 DIAGNOSIS — E78.5 HYPERLIPIDEMIA, UNSPECIFIED HYPERLIPIDEMIA TYPE: ICD-10-CM

## 2022-04-27 DIAGNOSIS — M79.18 CHRONIC PRIMARY MUSCULOSKELETAL PAIN: ICD-10-CM

## 2022-04-27 DIAGNOSIS — M54.12 CERVICAL RADICULOPATHY: ICD-10-CM

## 2022-04-27 DIAGNOSIS — R52 PAIN MANAGEMENT: ICD-10-CM

## 2022-04-27 DIAGNOSIS — Z51.81 ENCOUNTER FOR MEDICATION MONITORING: ICD-10-CM

## 2022-04-27 DIAGNOSIS — M54.50 LUMBAR SPINE PAIN: ICD-10-CM

## 2022-04-27 DIAGNOSIS — G89.29 CHRONIC PRIMARY MUSCULOSKELETAL PAIN: ICD-10-CM

## 2022-04-27 DIAGNOSIS — I10 ESSENTIAL HYPERTENSION: Primary | ICD-10-CM

## 2022-04-27 PROBLEM — I72.9 ANEURYSM (HCC): Status: ACTIVE | Noted: 2022-04-27

## 2022-04-27 PROCEDURE — G8432 DEP SCR NOT DOC, RNG: HCPCS | Performed by: NURSE PRACTITIONER

## 2022-04-27 PROCEDURE — G8427 DOCREV CUR MEDS BY ELIG CLIN: HCPCS | Performed by: NURSE PRACTITIONER

## 2022-04-27 PROCEDURE — G8417 CALC BMI ABV UP PARAM F/U: HCPCS | Performed by: NURSE PRACTITIONER

## 2022-04-27 PROCEDURE — 99214 OFFICE O/P EST MOD 30 MIN: CPT | Performed by: NURSE PRACTITIONER

## 2022-04-27 PROCEDURE — 3017F COLORECTAL CA SCREEN DOC REV: CPT | Performed by: NURSE PRACTITIONER

## 2022-04-27 PROCEDURE — G8755 DIAS BP > OR = 90: HCPCS | Performed by: NURSE PRACTITIONER

## 2022-04-27 PROCEDURE — G8752 SYS BP LESS 140: HCPCS | Performed by: NURSE PRACTITIONER

## 2022-04-27 RX ORDER — AMLODIPINE BESYLATE 10 MG/1
10 TABLET ORAL DAILY
Qty: 90 TABLET | Refills: 0 | Status: SHIPPED
Start: 2022-04-27 | End: 2022-04-28 | Stop reason: SINTOL

## 2022-04-27 NOTE — Clinical Note
Good Morning,     Just saw this patient, He is complaining of bilateral ankle edema, I did increase the amlodipine and believe this could be the cause. Would you agree I should change to losartan since I know you just completed a cardiac work up? or would you recommend a trial of another agent?  Please advise   Thanks  Carmenza Lafleur

## 2022-04-27 NOTE — PROGRESS NOTES
ROOM # 1  Identified pt with two pt identifiers(name and ). Reviewed record in preparation for visit and have obtained necessary documentation. Chief Complaint   Patient presents with    Hypertension      Erasto Burgos preferred language for health care discussion is english/other. Is the patient using any DME equipment during OV? Arely Mckeon is due for:  Health Maintenance Due   Topic    Pneumococcal 0-64 years (1 - PCV)    Shingrix Vaccine Age 50> (1 of 2)    DTaP/Tdap/Td series (2 - Td or Tdap)    Colorectal Cancer Screening Combo      Health Maintenance reviewed and discussed per provider  Please order/place referral if appropriate. 1. For patients aged 39-70: Has the patient had a colonoscopy? Yes - Care Gap present. Rooming MA/LPN to request most recent results   If the patient is female:    2. For patients aged 41-77: Has the patient had a mammogram within the past 2 years? No    3. For patients aged 21-65: Has the patient had a pap smear? No  Advance Directive:  1. Do you have an advance directive in place? Patient Reply: NO    2. If not, would you like material regarding how to put one in place? NO    Coordination of Care:  1. Have you been to the ER, urgent care clinic since your last visit? Hospitalized since your last visit? NO    2. Have you seen or consulted any other health care providers outside of the [address] [place] [address] since your last visit? Include any pap smears or colon screening. NO    Patient is accompanied by self I have received verbal consent from Erasto Burgos to discuss any/all medical information while they are present in the room.     Learning Assessment:  Learning Assessment 2019   PRIMARY LEARNER Patient Patient   HIGHEST LEVEL OF EDUCATION - PRIMARY LEARNER  > 4 YEARS OF COLLEGE > 4 YEARS OF COLLEGE   BARRIERS PRIMARY LEARNER NONE NONE   CO-LEARNER CAREGIVER No No   PRIMARY LANGUAGE ENGLISH ENGLISH   LEARNER PREFERENCE PRIMARY DEMONSTRATION DEMONSTRATION   ANSWERED BY Patient patient   RELATIONSHIP SELF SELF     Depression Screening:  3 most recent PHQ Screens 9/13/2021 10/30/2020 3/12/2020 3/9/2020 2/20/2020 12/4/2019 8/19/2019   Little interest or pleasure in doing things Nearly every day Not at all Not at all Not at all Not at all Not at all Not at all   Feeling down, depressed, irritable, or hopeless More than half the days Not at all Not at all Not at all Not at all Not at all Not at all   Total Score PHQ 2 5 0 0 0 0 0 0   Trouble falling or staying asleep, or sleeping too much Nearly every day - - - - - -   Feeling tired or having little energy Several days - - - - - -   Poor appetite, weight loss, or overeating Nearly every day - - - - - -   Feeling bad about yourself - or that you are a failure or have let yourself or your family down More than half the days - - - - - -   Trouble concentrating on things such as school, work, reading, or watching TV Nearly every day - - - - - -   Moving or speaking so slowly that other people could have noticed; or the opposite being so fidgety that others notice Not at all - - - - - -   Thoughts of being better off dead, or hurting yourself in some way Not at all - - - - - -   PHQ 9 Score 17 - - - - - -   How difficult have these problems made it for you to do your work, take care of your home and get along with others Not difficult at all - - - - - -     Fall Risk  Fall Risk Assessment, last 12 mths 3/28/2022   Able to walk? Yes   Fall in past 12 months? 0   Do you feel unsteady?  0   Are you worried about falling 0     Recent Travel Screening and Travel History documentation     Travel Screening     No screening recorded since 04/26/22 0000     Travel History   Travel since 03/27/22    No documented travel since 03/27/22

## 2022-04-27 NOTE — PATIENT INSTRUCTIONS
GAT at  ph: [phone] for colonoscopy       Lisy Vallejo, [address], [facility] [place] [place] [address]   You have been referred to:  [facility]isy Babcock. [facility]  [address] [address]  Phone: [phone]  Fax: [phone]

## 2022-04-27 NOTE — PROGRESS NOTES
HISTORY OF PRESENT ILLNESS  Norberto Arbeu is a 61 y.o. male. Here for HTN management. HPI  1) HTN - Amlodipine 10 mg and chlorthalidone 25 mg daily reports compliance with regimen. BP Readings from Last 3 Encounters:   04/27/22 (!) 128/91   04/06/22 (!) 140/88   03/28/22 138/84       2) HLD- Lipitor 20 mg daily -   Lab Results   Component Value Date/Time    Cholesterol, total 196 01/26/2022 12:00 AM    HDL Cholesterol 28 (L) 01/26/2022 12:00 AM    LDL, calculated 141 (H) 01/26/2022 12:00 AM    LDL, calculated 88 07/01/2019 09:10 AM    VLDL, calculated 27 01/26/2022 12:00 AM    VLDL, calculated 14 07/01/2019 09:10 AM    Triglyceride 146 01/26/2022 12:00 AM    CHOL/HDL Ratio 7.4 (H) 02/04/2019 08:04 AM       3) Chronic pain diagnosis. We discussed his arthralgias and radiculopathy that is affecting his back and neck. Significant changes since last visit: none. He is  able to do his normal daily activities. He reports the following adverse side effects: none.     Least pain over the last week has been 1/10.     Worst pain over the last week has been 5/10.     Opioid Risk Tool Reviewed: YES     Aberrant behaviors: None.       Urine Drug Screen: reviewed and up to date.     Controlled substance agreement on file: YES.        reviewed:yes     Concomitant use of a benzodiazepine: no     Naloxone prescription is warranted. It has been provided or is already on file. BP (!) 128/91 (BP 1 Location: Left upper arm, BP Patient Position: Sitting)   Pulse 88   Temp 97 °F (36.1 °C) (Temporal)   Resp 18   Ht 6' (1.829 m)   Wt 301 lb 9.6 oz (136.8 kg)   SpO2 98%   BMI 40.90 kg/m²     Current Outpatient Medications   Medication Sig Dispense Refill    amLODIPine (NORVASC) 10 mg tablet Take 1 Tablet by mouth daily.  90 Tablet 0    gabapentin (NEURONTIN) 300 mg capsule Take 1 cap po qam, 1 cap po every afternoon and 2 caps po qhs. 120 Capsule 5    chlorthalidone (HYGROTON) 25 mg tablet Take 1 Tablet by mouth daily. 90 Tablet 3    aspirin delayed-release 81 mg tablet Take 1 Tablet by mouth daily. 90 Tablet 3    tamsulosin (FLOMAX) 0.4 mg capsule TAKE ONE CAPSULE BY MOUTH DAILY AFTER DINNER 90 Capsule 3    atorvastatin (LIPITOR) 20 mg tablet Take 1 Tablet by mouth nightly. 90 Tablet 1    amitriptyline (ELAVIL) 25 mg tablet Take 3 Tablets by mouth nightly. 90 Tablet 2    budesonide-formoteroL (Symbicort) 80-4.5 mcg/actuation HFAA Take 2 Puffs by inhalation two (2) times a day. 3 Each 3    colchicine (Colcrys) 0.6 mg tablet Take 2 capsules by mouth now for 1 dose. Then take 1 cap by mouth 1 hour later. Repeat for future flares. 30 Tablet 0    albuterol (PROVENTIL HFA, VENTOLIN HFA, PROAIR HFA) 90 mcg/actuation inhaler Take  by inhalation.  nitroglycerin (NITROSTAT) 0.4 mg SL tablet 1 Tablet by SubLINGual route every five (5) minutes as needed for Chest Pain. Up to 3 doses. (Patient not taking: Reported on 4/6/2022) 25 Tablet 5    acetaminophen-codeine (TYLENOL #3) 300-30 mg per tablet Take 1 Tablet by mouth three (3) times daily as needed for Pain for up to 30 days. Max Daily Amount: 3 Tablets. 90 Tablet 0    naloxone (NARCAN) 4 mg/actuation nasal spray Use 1 spray intranasally, then discard. Repeat with new spray every 2 min as needed for opioid overdose symptoms, alternating nostrils. (Patient not taking: Reported on 4/6/2022) 2 Each 0       Review of Systems   Constitutional: Negative for chills, fever and malaise/fatigue. Eyes: Negative for blurred vision and double vision. Respiratory: Positive for shortness of breath. Negative for cough and wheezing. Cardiovascular: Positive for leg swelling. Negative for chest pain and palpitations. Neurological: Negative for dizziness and headaches. Physical Exam  Vitals and nursing note reviewed. Constitutional:       General: He is not in acute distress. Appearance: He is obese. He is not ill-appearing. HENT:      Head: Normocephalic. Right Ear: External ear normal.      Left Ear: External ear normal.      Mouth/Throat:      Comments: MASK  Eyes:      General: No scleral icterus. Extraocular Movements: Extraocular movements intact. Conjunctiva/sclera: Conjunctivae normal.      Pupils: Pupils are equal, round, and reactive to light. Cardiovascular:      Rate and Rhythm: Normal rate and regular rhythm. Pulses: Normal pulses. Heart sounds: Normal heart sounds. Pulmonary:      Effort: Pulmonary effort is normal. No respiratory distress. Breath sounds: Normal breath sounds. No wheezing. Musculoskeletal:         General: Normal range of motion. Cervical back: Normal range of motion. Right lower leg: Edema present. Left lower leg: Edema present. Lymphadenopathy:      Cervical: No cervical adenopathy. Skin:     General: Skin is warm and dry. Findings: No lesion or rash. Neurological:      General: No focal deficit present. Mental Status: He is alert and oriented to person, place, and time. Psychiatric:         Mood and Affect: Mood normal.         Behavior: Behavior normal.         Thought Content: Thought content normal.         Judgment: Judgment normal.       ASSESSMENT and PLAN  Diagnoses and all orders for this visit:    1. Essential hypertension  -     amLODIPine (NORVASC) 10 mg tablet; Take 1 Tablet by mouth daily. 2. Hyperlipidemia, unspecified hyperlipidemia type    3. Chronic primary musculoskeletal pain    4. Lumbar spine pain    5. Cervical radiculopathy    6. Pain management  -     TOXASSURE SELECT 13 (MW); Future    7. Encounter for medication monitoring  -     TOXASSURE SELECT 13 (MW); Future    HTN - Amlodipine may be causing the ankle swelling at the increased- will consult with cardiology on possible changes to amlodipine with losartan.    Continue chlorthalidone - recommended keeping F/U with cardiology  HLD - continue Lipitor    Pain management - UDS ordered   reviewed   Pain contract on file     Follow-up and Dispositions    · Return in about 3 months (around 7/27/2022) for HTN, pain management .

## 2022-04-28 ENCOUNTER — TELEPHONE (OUTPATIENT)
Dept: INTERNAL MEDICINE CLINIC | Age: 57
End: 2022-04-28

## 2022-04-28 DIAGNOSIS — E78.5 HYPERLIPIDEMIA, UNSPECIFIED HYPERLIPIDEMIA TYPE: Primary | ICD-10-CM

## 2022-04-28 DIAGNOSIS — I10 ESSENTIAL HYPERTENSION: ICD-10-CM

## 2022-04-28 RX ORDER — LOSARTAN POTASSIUM 25 MG/1
25 TABLET ORAL DAILY
Qty: 90 TABLET | Refills: 1 | Status: SHIPPED | OUTPATIENT
Start: 2022-04-28 | End: 2022-07-20 | Stop reason: SDUPTHER

## 2022-04-28 NOTE — TELEPHONE ENCOUNTER
Spoke with patient and recommended D/C amlodipine and starting losartan 25 mg for BP due to ankle swelling on the amlodipine.

## 2022-04-29 ENCOUNTER — DOCUMENTATION ONLY (OUTPATIENT)
Dept: PULMONOLOGY | Age: 57
End: 2022-04-29

## 2022-04-29 NOTE — PROGRESS NOTES
Lf vm for pt to speak w/Cheryl in regs to np sleep apt ref by Dr. Sharon Mart; when/where if any evals/studies/cpap/dme?  Ask screening questions

## 2022-05-04 LAB — DRUGS UR: NORMAL

## 2022-05-12 ENCOUNTER — OFFICE VISIT (OUTPATIENT)
Dept: CARDIOLOGY CLINIC | Age: 57
End: 2022-05-12
Payer: MEDICARE

## 2022-05-12 VITALS
TEMPERATURE: 97 F | HEART RATE: 87 BPM | BODY MASS INDEX: 41.56 KG/M2 | OXYGEN SATURATION: 98 % | DIASTOLIC BLOOD PRESSURE: 82 MMHG | SYSTOLIC BLOOD PRESSURE: 126 MMHG | WEIGHT: 306.4 LBS

## 2022-05-12 DIAGNOSIS — R07.9 CHEST PAIN, UNSPECIFIED TYPE: Primary | ICD-10-CM

## 2022-05-12 PROCEDURE — G8428 CUR MEDS NOT DOCUMENT: HCPCS | Performed by: INTERNAL MEDICINE

## 2022-05-12 PROCEDURE — G8752 SYS BP LESS 140: HCPCS | Performed by: INTERNAL MEDICINE

## 2022-05-12 PROCEDURE — G8754 DIAS BP LESS 90: HCPCS | Performed by: INTERNAL MEDICINE

## 2022-05-12 PROCEDURE — 3017F COLORECTAL CA SCREEN DOC REV: CPT | Performed by: INTERNAL MEDICINE

## 2022-05-12 PROCEDURE — 99214 OFFICE O/P EST MOD 30 MIN: CPT | Performed by: INTERNAL MEDICINE

## 2022-05-12 PROCEDURE — 93000 ELECTROCARDIOGRAM COMPLETE: CPT | Performed by: INTERNAL MEDICINE

## 2022-05-12 PROCEDURE — G8417 CALC BMI ABV UP PARAM F/U: HCPCS | Performed by: INTERNAL MEDICINE

## 2022-05-12 PROCEDURE — G8432 DEP SCR NOT DOC, RNG: HCPCS | Performed by: INTERNAL MEDICINE

## 2022-05-12 NOTE — PROGRESS NOTES
[facility]    Mr. Karin Farrell is a 61 y.o. male with a history of hypertension, hyperlipidemia, morbid obesity, back pain, other medical problems    Patient is here today for cardiac evaluation. He denies any prior history of MI or CHF. Patient had nuclear stress test in 04/2022 for the evaluation of dyspnea and chest discomfort. Nuclear stress test was abnormal so he underwent cardiac catheterization in 04/2022 which did not show significant obstructive coronary artery disease. Patient is here today for follow-up appointment. Overall his symptoms of dyspnea remains stable. He had 3 episode of chest discomfort not requiring nitroglycerin. He denies any presyncope or syncope. He admits of sedentary lifestyle and he also does not follow healthy dietary habits. He admits that he has gained almost 20 pounds over the last 2 years during pandemic. Denies any nausea, vomiting, abdominal pain, fever, chills, sputum production. 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 All others negative. Denies fatigue, malaise, skin rash, joint pain, blurring vision, photophobia, neck pain, hemoptysis, chronic cough, nausea, vomiting, hematuria, burning micturition, BRBPR, chronic headaches. Current Outpatient Medications   Medication Sig    losartan (COZAAR) 25 mg tablet Take 1 Tablet by mouth daily.  chlorthalidone (HYGROTON) 25 mg tablet Take 1 Tablet by mouth daily.  aspirin delayed-release 81 mg tablet Take 1 Tablet by mouth daily.     nitroglycerin (NITROSTAT) 0.4 mg SL tablet 1 Tablet by SubLINGual route every five (5) minutes as needed for Chest Pain. Up to 3 doses.  atorvastatin (LIPITOR) 20 mg tablet Take 1 Tablet by mouth nightly.  gabapentin (NEURONTIN) 300 mg capsule Take 1 cap po qam, 1 cap po every afternoon and 2 caps po qhs.  tamsulosin (FLOMAX) 0.4 mg capsule TAKE ONE CAPSULE BY MOUTH DAILY AFTER DINNER    acetaminophen-codeine (TYLENOL #3) 300-30 mg per tablet Take 1 Tablet by mouth three (3) times daily as needed for Pain for up to 30 days. Max Daily Amount: 3 Tablets.  amitriptyline (ELAVIL) 25 mg tablet Take 3 Tablets by mouth nightly.  budesonide-formoteroL (Symbicort) 80-4.5 mcg/actuation HFAA Take 2 Puffs by inhalation two (2) times a day.  colchicine (Colcrys) 0.6 mg tablet Take 2 capsules by mouth now for 1 dose. Then take 1 cap by mouth 1 hour later. Repeat for future flares.  naloxone (NARCAN) 4 mg/actuation nasal spray Use 1 spray intranasally, then discard. Repeat with new spray every 2 min as needed for opioid overdose symptoms, alternating nostrils. (Patient not taking: Reported on 4/6/2022)    albuterol (PROVENTIL HFA, VENTOLIN HFA, PROAIR HFA) 90 mcg/actuation inhaler Take  by inhalation. No current facility-administered medications for this visit.        Past Surgical History:   Procedure Laterality Date    HX HERNIA REPAIR      HX TONSILLECTOMY      HX UROLOGICAL  8/27/15    PNBx-TRUS Vol 47 cc's, Benign, Dr. Coffey Bench and Sensitivities:  Allergies   Allergen Reactions    Bee Venom Protein (Honey Bee) Swelling       Family History:  Family History   Problem Relation Age of Onset    Cancer Mother     No Known Problems Father     Asthma Sister     Stroke Maternal Grandmother        Social History:  Social History     Tobacco Use    Smoking status: Former Smoker    Smokeless tobacco: Never Used   Vaping Use    Vaping Use: Never used Substance Use Topics    Alcohol use: Yes     Alcohol/week: 2.0 standard drinks     Types: 2 Shots of liquor per week     Comment: daily    Drug use: No     He  reports that he has quit smoking. He has never used smokeless tobacco.  He  reports current alcohol use of about 2.0 standard drinks of alcohol per week. Physical Exam:  BP Readings from Last 3 Encounters:   04/27/22 (!) 128/91   04/06/22 (!) 140/88   03/28/22 138/84         Pulse Readings from Last 3 Encounters:   04/27/22 88   04/06/22 96   03/28/22 94          Wt Readings from Last 3 Encounters:   04/27/22 136.8 kg (301 lb 9.6 oz)   04/06/22 133.8 kg (295 lb)   03/28/22 134.7 kg (297 lb)       Constitutional: Oriented to person, place, and time. HENT: Head: Normocephalic and atraumatic. Neck: No JVD present. Carotid bruit is not appreciated. Cardiovascular: Regular rhythm. No murmur, gallop or rubs appreciated  Lung: Breath sounds normal. No respiratory distress. No ronchi or rales appreciated  Abdominal: No tenderness. No rebound and no guarding. Musculoskeletal: There is trace/ lower extremity edema. No cynosis      LABS:   @  Lab Results   Component Value Date/Time    WBC 7.7 03/28/2022 12:22 PM    HGB 15.6 03/28/2022 12:22 PM    HCT 45.4 03/28/2022 12:22 PM    PLATELET 265 16/43/2135 12:22 PM    MCV 95.2 03/28/2022 12:22 PM     Lab Results   Component Value Date/Time    Sodium 137 03/28/2022 12:22 PM    Potassium 3.8 03/28/2022 12:22 PM    Chloride 102 03/28/2022 12:22 PM    CO2 30 03/28/2022 12:22 PM    Glucose 101 (H) 03/28/2022 12:22 PM    BUN 19 (H) 03/28/2022 12:22 PM    Creatinine 1.34 (H) 03/28/2022 12:22 PM     Lipids Latest Ref Rng & Units 1/26/2022 7/1/2019 2/4/2019   Chol, Total 100 - 199 mg/dL 196 125 171   HDL >39 mg/dL 28(L) 23(L) 23(L)   LDL 0 - 99 mg/dL 141(H) 88 116(H)   Trig 0 - 149 mg/dL 146 71 160(H)   Chol/HDL Ratio 0 - 5.0   - - 7. 4(H)   Some recent data might be hidden     Lab Results   Component Value Date/Time ALT (SGPT) 52 2022 12:22 PM     Lab Results   Component Value Date/Time    Hemoglobin A1c (POC) 5.2 2019 07:53 AM     Lab Results   Component Value Date/Time    TSH 1.300 2019 09:10 AM     EK2022: Sinus rhythm at 93 bpm.  Nonspecific ST changes. Possible left atrial enlargement. 03/15/22    ECHO ADULT COMPLETE 03/15/2022 3/15/2022    Interpretation Summary    Left Ventricle: Left ventricle size is normal. Moderate septal thickening. Normal wall motion. Normal left ventricular systolic function with a visually estimated EF of 55 - 60%. Normal diastolic function.   Right Ventricle: Right ventricle is mildly dilated.   Mitral Valve: Mildly thickened leaflet. Mild transvalvular regurgitation.   Pulmonary Arteries: Pulmonary hypertension not present. The estimated pulmonary artery systolic pressure is 26 mmHg.   Aorta: Normal sized annulus, sinus of Valsalva and aortic arch. Mildly dilated ascending aort at 3.7 cm    NUCLEAR CARDIAC STRESS TEST 2022 3/28/2022  Interpretation Summary    Nuclear Findings: LVEF measures 65%. TID ratio is 0.83.    Nuclear Findings: The study cannot rule out a small degree of myocardial infarction. There is a moderate severity left ventricular stress perfusion defect present in the mid to distal inferoseptal, inferior and inferolateral segment(s) that is predominantly fixed. The possibility of artifact and infarction cannot be excluded.   Nuclear Findings: Normal left ventricular systolic function post-stress. LVEF measures 65%.   Nuclear Findings: LV perfusion is probably abnormal.    Nuclear Findings: The study is positive for myocardial infarction and is negative for myocardial ischemia. The defect appears to be infarction.   ECG: Resting ECG demonstrates normal sinus rhythm.   ECG: Stress ECG was negative for ischemia.   Stress Test: A Bunny protocol stress test was performed. Hemodynamics are adequate for diagnosis.  Blood pressure demonstrated a normal response and heart rate demonstrated a normal response to stress. The patient's heart rate recovery was normal.    CARDIAC CATH 4/6/2022    Conclusion  LM: Normal  Dominant right  LAD, LCX and RCA: Minimal luminal irregularities. Slow flow. LAD smaller caliber vessel  LVEF and LVEDP normal  Risk factor modifications. IMPRESSION & PLAN:  Mr. Sujatha Seals is a 61 y.o. male    Dyspnea and chest pain:  Continue aspirin 81 mg daily  Nuclear stress test in 03/2022, intermediate risk with evidence of inferior/inferoseptal or inferolateral perfusion defect. CATH 04/22 with no obstructive CAD, only slow coronary flow. Normal LVEf and LVEDP  Discussed with the patient the test finding. Cath images reviewed with patient as well. Patient was reassured that he does not have obstructive coronary disease. It is possible he may have microvascular dysfunction. Use nitroglycerin as needed. Risk factor modification advised    Hypertension:  /82 mmHg. Currently on amlodipine and chlorthalidone. Hyperlipidemia:  Recent . Atorvastatin 20 mg has been started recently by PCP. Dietary modification advised. Repeat fasting lipid profile in 3 months    Possible sleep apnea:  Patient has gained almost 100 pounds. Now he snores and his apple watch is suggesting excessive snoring and hypoxia during sleep. Will refer patient to sleep apnea clinic. Echo with PA pressure of 26. Morbid obesity:  Patient has gained almost 100 pounds over last couple years. Now weight 306 pounds with BMI of 41. I recommended dietary modification exercise plan. I encouraged him to see a nutritionist or dietitian or also have  to help him motivate exercise program    Thank you for allowing me to participate in patient care. Please feel free to call me if you have any question or concern. Arabella Thomas MD  Please note: This document has been produced using voice recognition software. Unrecognized errors in transcription may be present.

## 2022-05-12 NOTE — PROGRESS NOTES
Grazyna Mccoy was seen 09/03/2019 for an audiological evaluation.  Patient complains of long term hearing loss in her right ear following an episode of vertigo around 15 years ago.  She denies tinnitus and dizziness for over 10 years.      Results reveal a moderate-to-severe sensorineural hearing loss 250-8000 Hz for the right ear, and  mild-to-severe sensorineural hearing loss 250-8000 Hz for the left ear.   Speech Reception Thresholds were  50 dBHL for the right ear and 10 dBHL for the left ear.   Word recognition scores were fair for the right ear and excellent for the left ear.   Tympanograms were Type A for the right ear and Type A for the left ear.    Patient was counseled on the above findings.    REC:  ENT consult  HAC if pt becomes motivated           Identified pt with two pt identifiers(name and ). Reviewed record in preparation for visit and have obtained necessary documentation. Alvenavarro Pap presents today for   Chief Complaint   Patient presents with    Follow-up       Pt c/o SOB          Alvenia Pap preferred language for health care discussion is english/other. Personal Protective Equipment:   Personal Protective Equipment was used including: mask-surgical and hands-gloves. Patient was placed on no precaution(s). Patient was masked. Precautions:   Patient currently on None  Patient currently roomed with door closed. Is someone accompanying this pt? No     Is the patient using any DME equipment during OV?  No     Depression Screening:  3 most recent PHQ Screens 2021   Little interest or pleasure in doing things Nearly every day   Feeling down, depressed, irritable, or hopeless More than half the days   Total Score PHQ 2 5   Trouble falling or staying asleep, or sleeping too much Nearly every day   Feeling tired or having little energy Several days   Poor appetite, weight loss, or overeating Nearly every day   Feeling bad about yourself - or that you are a failure or have let yourself or your family down More than half the days   Trouble concentrating on things such as school, work, reading, or watching TV Nearly every day   Moving or speaking so slowly that other people could have noticed; or the opposite being so fidgety that others notice Not at all   Thoughts of being better off dead, or hurting yourself in some way Not at all   PHQ 9 Score 17   How difficult have these problems made it for you to do your work, take care of your home and get along with others Not difficult at all       Learning Assessment:  Learning Assessment 2019   PRIMARY LEARNER Patient   HIGHEST LEVEL OF EDUCATION - PRIMARY LEARNER  > 4 [facility] CAREGIVER No   PRIMARY LANGUAGE ENGLISH   LEARNER PREFERENCE PRIMARY DEMONSTRATION   ANSWERED BY Patient   RELATIONSHIP SELF       Abuse Screening:  No flowsheet data found. Fall Risk  Fall Risk Assessment, last 12 mths 3/28/2022   Able to walk? Yes   Fall in past 12 months? 0   Do you feel unsteady? 0   Are you worried about falling 0       Pt currently taking Anticoagulant therapy? No   Pt currently taking Antiplatelet therapy? yes    Coordination of Care:  1. Have you been to the ER, urgent care clinic since your last visit? Hospitalized since your last visit? No     2. Have you seen or consulted any other health care providers outside of the [address] [place] [address] since your last visit? Include any pap smears or colon screening. Yes. Please see Red banners under Allergies and Med Rec to remove outside inquires. All correct information has been verified with patient and added to chart.      Medication's patient's would liked removed has been marked not taking to be removed per Verbal order and read back per Abhinav Goodman MD

## 2022-05-19 DIAGNOSIS — M54.12 CERVICAL RADICULOPATHY: ICD-10-CM

## 2022-05-19 NOTE — TELEPHONE ENCOUNTER
Pt called to state that w/c is holding up his med refill for gabapentin tylenol 3 and amatriptoline prescribed by dr Noa Brothers. Pt is stating that the nurse needs to use the [facility] w/c portal to get these filled and this may be monthly rep is [phone] gail loo x 160. Supervisor is ashley noel x 114.

## 2022-05-20 RX ORDER — GABAPENTIN 300 MG/1
CAPSULE ORAL
Qty: 120 CAPSULE | Refills: 2 | Status: SHIPPED | OUTPATIENT
Start: 2022-05-20 | End: 2022-08-17 | Stop reason: SDUPTHER

## 2022-05-20 NOTE — TELEPHONE ENCOUNTER
Please see previous message. Patient called again and  Is asking to speak to someone from [facility] office about the medication. Patient would like a call back at  [facility] [phone].

## 2022-05-20 NOTE — TELEPHONE ENCOUNTER
Called pt back he spoke to his w/c rep - he states they are authorizing the med be [facility] [address] in [place]. He also stated that as of may 1,2022 any prescription refills will have to go thru the [facility] w/c portal and if there are any issues there is an 800 number to call to help with getting in. Advised pt that several nurses tried unsuccessfully yesterday to get into the portal.  Please call into [facility] for the refill.

## 2022-06-07 DIAGNOSIS — G57.11 MERALGIA PARAESTHETICA, RIGHT: ICD-10-CM

## 2022-06-07 DIAGNOSIS — R20.0 NUMBNESS AND TINGLING IN BOTH HANDS: ICD-10-CM

## 2022-06-07 DIAGNOSIS — R20.2 NUMBNESS AND TINGLING IN BOTH HANDS: ICD-10-CM

## 2022-06-08 RX ORDER — AMITRIPTYLINE HYDROCHLORIDE 25 MG/1
TABLET, FILM COATED ORAL
Qty: 90 TABLET | Refills: 2 | Status: SHIPPED | OUTPATIENT
Start: 2022-06-08

## 2022-07-01 ENCOUNTER — OFFICE VISIT (OUTPATIENT)
Dept: PULMONOLOGY | Age: 57
End: 2022-07-01
Payer: MEDICARE

## 2022-07-01 VITALS
HEIGHT: 72 IN | OXYGEN SATURATION: 96 % | HEART RATE: 88 BPM | WEIGHT: 298.6 LBS | RESPIRATION RATE: 16 BRPM | SYSTOLIC BLOOD PRESSURE: 143 MMHG | BODY MASS INDEX: 40.44 KG/M2 | TEMPERATURE: 97.8 F | DIASTOLIC BLOOD PRESSURE: 93 MMHG

## 2022-07-01 DIAGNOSIS — G47.19 EXCESSIVE DAYTIME SLEEPINESS: ICD-10-CM

## 2022-07-01 DIAGNOSIS — F39 MOOD DISORDER (HCC): ICD-10-CM

## 2022-07-01 DIAGNOSIS — R68.89 VIVID DREAM: ICD-10-CM

## 2022-07-01 DIAGNOSIS — E66.01 MORBID OBESITY (HCC): ICD-10-CM

## 2022-07-01 DIAGNOSIS — F10.20 ALCOHOLISM (HCC): ICD-10-CM

## 2022-07-01 DIAGNOSIS — I10 ESSENTIAL HYPERTENSION: ICD-10-CM

## 2022-07-01 DIAGNOSIS — R06.83 SNORING: Primary | ICD-10-CM

## 2022-07-01 DIAGNOSIS — G45.4 TRANSIENT GLOBAL AMNESIA: ICD-10-CM

## 2022-07-01 PROCEDURE — G8417 CALC BMI ABV UP PARAM F/U: HCPCS | Performed by: INTERNAL MEDICINE

## 2022-07-01 PROCEDURE — 99205 OFFICE O/P NEW HI 60 MIN: CPT | Performed by: INTERNAL MEDICINE

## 2022-07-01 PROCEDURE — G8753 SYS BP > OR = 140: HCPCS | Performed by: INTERNAL MEDICINE

## 2022-07-01 PROCEDURE — G8510 SCR DEP NEG, NO PLAN REQD: HCPCS | Performed by: INTERNAL MEDICINE

## 2022-07-01 PROCEDURE — G8427 DOCREV CUR MEDS BY ELIG CLIN: HCPCS | Performed by: INTERNAL MEDICINE

## 2022-07-01 PROCEDURE — 3017F COLORECTAL CA SCREEN DOC REV: CPT | Performed by: INTERNAL MEDICINE

## 2022-07-01 PROCEDURE — G8755 DIAS BP > OR = 90: HCPCS | Performed by: INTERNAL MEDICINE

## 2022-07-01 NOTE — PATIENT INSTRUCTIONS
Please call our clinic back at [phone] or send a message on [employer] if you have any questions or concerns or if you are experiencing any of the following:     You have not received a follow up appointment within 30 days prior the recommended follow up time.  If you are not tolerating treatment plan and/or not able to obtain equipment or prescribed medication(s).  if you are experiencing any difficulties with the Durable Medical Equipment  (DME) Company you may be using or is assigned to you.  Two weeks have passed and you have not received an appointment for a scheduled procedure.  Two weeks have passed since you underwent a test and/or procedure and you have not received your results. If you are using a CPAP/BIPAP, or Home Ventilator Device- Please note the following. Currently, many DMEs are experiencing supply chain difficulties and orders for equipment may be back logged several weeks to months. [employer] (DME ) company is supposed to provide you with replacement filters, tubing and masks. You can either call your DME when you need new supplies or you can arrange for an automatic shipment schedule.  Your need to be seen by our office at lat minimum of every 12 months in order to renew the prescription for these supplies.  Please make note of who your DME company is and their phone number.  Please make sure that you clean your mask and hosing on a regular basis. Your DME can provide you with additional information regarding proper care and cleaning of your device           Safety is strongly encouraged. You should not drive if sleepy, tired, distracted and/or  fatigued. If a procedure or imaging study has been ordered for your by this clinic please call the Darlene at [facility] or Nomi at  [address] [place] [address] [place] and blood work do not require appointments.   They are considered \"Walk-In\" services and can be obtained at either [facility] or [facility]. Blood work is performed at the Laboratory (Lab) from 08:00am - 04:00pm      -Thank you      Learning About Sleep Apnea  What is it? Sleep apnea means that breathing stops for short periods during sleep. When you stop breathing or have reduced airflow into your lungs during sleep, you don't sleep well and you can be very tired during the day. The oxygen levels in your blood may go down, and carbon dioxide levels go up. It may lead to other problems, such as high blood pressure and heart disease. Sleep apnea can range from mild to severe, based on how often breathing stops during sleep. For adults, breathing may stop as few as 5 times an hour (mild apnea) to 30 or more times an hour (severe apnea). Obstructive sleep apnea is the most common type. This most often occurs because your airways are blocked or partly blocked. Central sleep apnea is less common. It happens when the brain has trouble controlling breathing. Some people have both types. That's called complex sleep apnea. What are the symptoms? There are symptoms of sleep apnea that you may notice and symptoms that others may notice when you're asleep. Symptoms you may notice include:  · Feeling extremely sleepy during the day. · Feeling unrefreshed or tired after a night's sleep. · Problems with memory and concentration, or mood changes. · Morning headaches. · Getting up often during the night to urinate. · A dry mouth or sore throat in the morning. If you have a bed partner, they may notice that you:  · Have episodes of not breathing. · Snore loudly. Almost all people who have sleep apnea snore. But not all people who snore have sleep apnea. · Toss and turn during sleep. · Have nighttime choking or gasping spells. How is it diagnosed? Your doctor will probably do a physical exam and ask about your past health.  The doctor may also ask you or your bed partner about your snoring and sleep behavior and how tired you feel during the day. Your doctor may suggest a sleep study. Sleep studies are a series of tests that look at what happens to the body during sleep. They check for how often you stop breathing or have too little air flowing into your lungs during sleep. They also find out how much oxygen you have in your blood during sleep. A sleep study may take place in your home. Or it might take place at a sleep center, where you will spend the night. If your sleep apnea doesn't improve with treatment, you may have more tests to find out what's causing it. How is it treated? You may be able to help treat sleep apnea by making some lifestyle changes. You could try to lose weight, sleep on your side, and avoid alcohol and medicines like sedatives before bed. Sleep apnea is often treated with machines that deliver air through a mask to help keep your airways open. These include:  · Continuous positive airway pressure (CPAP). This increases air pressure in your throat. It keeps your airway open when you breathe in. It's the most common device. · Bilevel positive airway pressure (BPAP). You may also hear this called BiPAP. This uses different air pressures when you breathe in and out. · Adaptive servo ventilation (ASV). It senses pauses in breathing and adjusts air pressure. It's mostly used for central sleep apnea. You can also try oral breathing devices or nasal devices. If your tonsils or other tissues are blocking your airway, your doctor may suggest surgery to open the airway. How can you care for yourself at home? · Lose weight, if needed. · Sleep on your side. It may help mild apnea. · Avoid alcohol and medicines such as sleeping pills, opioids, or sedatives before bed. · Don't smoke. If you need help quitting, talk to your doctor. · Prop up the head of your bed.   · Treat breathing problems, such as a stuffy nose, that are caused by a cold or allergies. · Try a continuous positive airway pressure (CPAP) breathing machine if your doctor recommends it. · If CPAP doesn't work for you, ask your doctor if you can try other masks, settings, or breathing machines. · Try oral breathing devices or other nasal devices. · Talk to your doctor if your nose feels dry or bleeds, or if it gets runny or stuffy when you use a breathing machine. · Tell your doctor if you're sleepy during the day and it affects your daily life. Don't drive or operate machinery when you're drowsy. Where can you learn more? Go to http://www.gray.com/  Enter S121 in the search box to learn more about \"Learning About Sleep Apnea. \"  Current as of: July 6, 2021               Content Version: 13.2  © 2006-2022 [employer]. Care instructions adapted under license by [employer] (which disclaims liability or warranty for this information). If you have questions about a medical condition or this instruction, always ask your healthcare professional. [place] [address] any warranty or liability for your use of this information.          Directions to [facility]
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
## 2022-07-01 NOTE — LETTER
7/1/2022    Patient: Latonya Witt   YOB: 1965   Date of Visit: 7/1/2022     Dorothy Morelos NP  [address] [facility] 100  [facility] [phone]  [facility] [phone]  Via In [place]    Dear STEPHEN Soto MD,      Thank you for referring Mr. Jose Miguel Krishnamurthy to [address] [place] [address] for evaluation. My notes for this consultation are attached. If you have questions, please do not hesitate to call me. I look forward to following your patient along with you.       Sincerely,    Louise Lugo, DO

## 2022-07-01 NOTE — PROGRESS NOTES
Anyi Oshea presents today for   Chief Complaint   Patient presents with    New Patient    Snoring    Other     Tachycardia       Is someone accompanying this pt? No    Is the patient using any DME equipment during OV? NO       Depression Screening:  3 most recent PHQ Screens 7/1/2022   Little interest or pleasure in doing things Not at all   Feeling down, depressed, irritable, or hopeless Not at all   Total Score PHQ 2 0   Trouble falling or staying asleep, or sleeping too much -   Feeling tired or having little energy -   Poor appetite, weight loss, or overeating -   Feeling bad about yourself - or that you are a failure or have let yourself or your family down -   Trouble concentrating on things such as school, work, reading, or watching TV -   Moving or speaking so slowly that other people could have noticed; or the opposite being so fidgety that others notice -   Thoughts of being better off dead, or hurting yourself in some way -   PHQ 9 Score -   How difficult have these problems made it for you to do your work, take care of your home and get along with others -       Learning Assessment:  Learning Assessment 8/9/2019   PRIMARY LEARNER Patient   HIGHEST LEVEL OF EDUCATION - PRIMARY LEARNER  > 4 YEARS OF COLLEGE   BARRIERS PRIMARY LEARNER NONE   CO-LEARNER CAREGIVER No   PRIMARY LANGUAGE ENGLISH   LEARNER PREFERENCE PRIMARY DEMONSTRATION   ANSWERED BY Patient   RELATIONSHIP SELF       Abuse Screening:  No flowsheet data found. Fall Risk  Fall Risk Assessment, last 12 mths 3/28/2022   Able to walk? Yes   Fall in past 12 months? 0   Do you feel unsteady? 0   Are you worried about falling 0         Coordination of Care:  1. Have you been to the ER, urgent care clinic since your last visit? Hospitalized since your last visit? No    2. Have you seen or consulted any other health care providers outside of the [address] [place] [address] since your last visit? Include any pap smears or colon screening. NO

## 2022-07-14 DIAGNOSIS — M54.12 CERVICAL RADICULOPATHY: ICD-10-CM

## 2022-07-14 DIAGNOSIS — M54.50 LUMBAR SPINE PAIN: ICD-10-CM

## 2022-07-14 RX ORDER — ACETAMINOPHEN AND CODEINE PHOSPHATE 300; 30 MG/1; MG/1
1 TABLET ORAL
Qty: 90 TABLET | Refills: 0 | Status: SHIPPED | OUTPATIENT
Start: 2022-07-14 | End: 2022-08-13

## 2022-07-20 DIAGNOSIS — I10 ESSENTIAL HYPERTENSION: ICD-10-CM

## 2022-07-20 RX ORDER — LOSARTAN POTASSIUM 25 MG/1
25 TABLET ORAL DAILY
Qty: 90 TABLET | Refills: 1 | Status: SHIPPED | OUTPATIENT
Start: 2022-07-20

## 2022-08-16 ENCOUNTER — TELEPHONE (OUTPATIENT)
Dept: ORTHOPEDIC SURGERY | Age: 57
End: 2022-08-16

## 2022-08-16 NOTE — TELEPHONE ENCOUNTER
Patient called and is asking for a refill on the Gabapentin 300 mg medication from [facility]. Patient said he is in [place] and would like the medication sent there. [facility] on [address] [address] in [place]. [phone]. Patient tel. [phone]. Note : patient last appt was on 04/20/2022.

## 2022-08-16 NOTE — TELEPHONE ENCOUNTER
I do not think [address] [place] [address] Court will fill an out of state rx for a scheduled drug.

## 2022-08-17 NOTE — TELEPHONE ENCOUNTER
Patient left a message asking for the status of this request        For [employer] in place:   Recommendation Provided To:    Intervention Detail: New Rx: 1, reason: Patient Preference  Gap Closed?:   Intervention Accepted By:   Time Spent (min): 5

## 2024-03-27 NOTE — TELEPHONE ENCOUNTER
This is an error. Patient was actually requesting Symbicort. That medication was refilled 08/12/2019. NAUSEA/PAIN
